# Patient Record
Sex: FEMALE | Race: WHITE | NOT HISPANIC OR LATINO
[De-identification: names, ages, dates, MRNs, and addresses within clinical notes are randomized per-mention and may not be internally consistent; named-entity substitution may affect disease eponyms.]

---

## 2017-05-05 ENCOUNTER — RX ONLY (RX ONLY)
Age: 52
End: 2017-05-05

## 2017-05-05 ENCOUNTER — APPOINTMENT (OUTPATIENT)
Dept: URBAN - METROPOLITAN AREA CLINIC 200 | Age: 52
Setting detail: DERMATOLOGY
End: 2017-05-11

## 2017-05-05 DIAGNOSIS — L74.51 PRIMARY FOCAL HYPERHIDROSIS: ICD-10-CM

## 2017-05-05 DIAGNOSIS — L57.0 ACTINIC KERATOSIS: ICD-10-CM

## 2017-05-05 DIAGNOSIS — L82.0 INFLAMED SEBORRHEIC KERATOSIS: ICD-10-CM

## 2017-05-05 DIAGNOSIS — L57.8 OTHER SKIN CHANGES DUE TO CHRONIC EXPOSURE TO NONIONIZING RADIATION: ICD-10-CM

## 2017-05-05 PROBLEM — D23.71 OTHER BENIGN NEOPLASM OF SKIN OF RIGHT LOWER LIMB, INCLUDING HIP: Status: ACTIVE | Noted: 2017-05-05

## 2017-05-05 PROBLEM — L74.513 PRIMARY FOCAL HYPERHIDROSIS, SOLES: Status: ACTIVE | Noted: 2017-05-05

## 2017-05-05 PROCEDURE — OTHER COUNSELING: OTHER

## 2017-05-05 PROCEDURE — 99203 OFFICE O/P NEW LOW 30 MIN: CPT | Mod: 25

## 2017-05-05 PROCEDURE — 17000 DESTRUCT PREMALG LESION: CPT

## 2017-05-05 PROCEDURE — 17003 DESTRUCT PREMALG LES 2-14: CPT

## 2017-05-05 PROCEDURE — OTHER LIQUID NITROGEN: OTHER

## 2017-05-05 PROCEDURE — OTHER PRESCRIPTION: OTHER

## 2017-05-05 RX ORDER — FLUOROURACIL 2 G/40G
CREAM TOPICAL
Qty: 1 | Refills: 6

## 2017-05-05 RX ORDER — CLINDAMYCIN PHOSPHATE 10 MG/ML
SOLUTION TOPICAL
Qty: 1 | Refills: 6 | Status: ERX

## 2017-05-05 ASSESSMENT — LOCATION SIMPLE DESCRIPTION DERM
LOCATION SIMPLE: RIGHT PLANTAR SURFACE
LOCATION SIMPLE: ABDOMEN
LOCATION SIMPLE: RIGHT UPPER BACK
LOCATION SIMPLE: LEFT NOSE
LOCATION SIMPLE: RIGHT EYEBROW
LOCATION SIMPLE: LEFT PLANTAR SURFACE
LOCATION SIMPLE: LEFT CHEEK

## 2017-05-05 ASSESSMENT — LOCATION DETAILED DESCRIPTION DERM
LOCATION DETAILED: LEFT NASAL ALA
LOCATION DETAILED: LEFT LATERAL MANDIBULAR CHEEK
LOCATION DETAILED: LEFT INFERIOR CENTRAL MALAR CHEEK
LOCATION DETAILED: RIGHT INSTEP
LOCATION DETAILED: RIGHT MEDIAL EYEBROW
LOCATION DETAILED: PERIUMBILICAL SKIN
LOCATION DETAILED: LEFT MEDIAL PLANTAR MIDFOOT
LOCATION DETAILED: RIGHT SUPERIOR MEDIAL UPPER BACK

## 2017-05-05 ASSESSMENT — LOCATION ZONE DERM
LOCATION ZONE: NOSE
LOCATION ZONE: FACE
LOCATION ZONE: TRUNK
LOCATION ZONE: FEET

## 2017-05-05 NOTE — PROCEDURE: LIQUID NITROGEN
Number Of Freeze-Thaw Cycles: 2 freeze-thaw cycles
Render Post-Care Instructions In Note?: no
Consent: The patient's consent was obtained including but not limited to risks of crusting, scabbing, blistering, scarring, darker or lighter pigmentary change, recurrence, incomplete removal and infection.
Duration Of Freeze Thaw-Cycle (Seconds): 10
Detail Level: Detailed
Post-Care Instructions: I reviewed with the patient in detail post-care instructions. Patient is to wear sunprotection, and avoid picking at any of the treated lesions. Pt may apply Vaseline to crusted or scabbing areas.

## 2018-05-15 ENCOUNTER — APPOINTMENT (OUTPATIENT)
Dept: URBAN - METROPOLITAN AREA CLINIC 200 | Age: 53
Setting detail: DERMATOLOGY
End: 2018-05-18

## 2018-05-15 DIAGNOSIS — L74.51 PRIMARY FOCAL HYPERHIDROSIS: ICD-10-CM

## 2018-05-15 DIAGNOSIS — B07.8 OTHER VIRAL WARTS: ICD-10-CM

## 2018-05-15 DIAGNOSIS — L57.8 OTHER SKIN CHANGES DUE TO CHRONIC EXPOSURE TO NONIONIZING RADIATION: ICD-10-CM

## 2018-05-15 DIAGNOSIS — L57.0 ACTINIC KERATOSIS: ICD-10-CM

## 2018-05-15 DIAGNOSIS — D485 NEOPLASM OF UNCERTAIN BEHAVIOR OF SKIN: ICD-10-CM

## 2018-05-15 PROBLEM — L74.513 PRIMARY FOCAL HYPERHIDROSIS, SOLES: Status: ACTIVE | Noted: 2018-05-15

## 2018-05-15 PROBLEM — D48.5 NEOPLASM OF UNCERTAIN BEHAVIOR OF SKIN: Status: ACTIVE | Noted: 2018-05-15

## 2018-05-15 PROCEDURE — OTHER PRESCRIPTION: OTHER

## 2018-05-15 PROCEDURE — OTHER MIPS QUALITY: OTHER

## 2018-05-15 PROCEDURE — 11100: CPT | Mod: 59

## 2018-05-15 PROCEDURE — 99213 OFFICE O/P EST LOW 20 MIN: CPT | Mod: 25

## 2018-05-15 PROCEDURE — OTHER COUNSELING: OTHER

## 2018-05-15 PROCEDURE — OTHER BIOPSY BY SHAVE METHOD: OTHER

## 2018-05-15 PROCEDURE — 17110 DESTRUCT B9 LESION 1-14: CPT

## 2018-05-15 PROCEDURE — OTHER LIQUID NITROGEN: OTHER

## 2018-05-15 RX ORDER — ALUMINUM CHLORIDE 20 %
SOLUTION, NON-ORAL TOPICAL QHS
Qty: 1 | Refills: 3 | Status: ERX | COMMUNITY
Start: 2018-05-15

## 2018-05-15 ASSESSMENT — LOCATION SIMPLE DESCRIPTION DERM
LOCATION SIMPLE: LEFT FOREHEAD
LOCATION SIMPLE: LEFT HAND
LOCATION SIMPLE: CHEST
LOCATION SIMPLE: LEFT PLANTAR SURFACE
LOCATION SIMPLE: LEFT CHEEK

## 2018-05-15 ASSESSMENT — LOCATION ZONE DERM
LOCATION ZONE: FACE
LOCATION ZONE: TRUNK
LOCATION ZONE: FACE
LOCATION ZONE: FEET
LOCATION ZONE: HAND

## 2018-05-15 ASSESSMENT — PAIN INTENSITY VAS: HOW INTENSE IS YOUR PAIN 0 BEING NO PAIN, 10 BEING THE MOST SEVERE PAIN POSSIBLE?: NO PAIN

## 2018-05-15 ASSESSMENT — LOCATION DETAILED DESCRIPTION DERM
LOCATION DETAILED: LEFT SUPERIOR FOREHEAD
LOCATION DETAILED: LEFT MEDIAL PLANTAR MIDFOOT
LOCATION DETAILED: LEFT CENTRAL MALAR CHEEK
LOCATION DETAILED: LEFT HYPOTHENAR EMINENCE
LOCATION DETAILED: LEFT SUPERIOR LATERAL MALAR CHEEK
LOCATION DETAILED: LEFT MEDIAL SUPERIOR CHEST

## 2018-05-15 NOTE — PROCEDURE: BIOPSY BY SHAVE METHOD
Notification Instructions: Patient will be notified of biopsy results. However, patient instructed to call the office if not contacted within 2 weeks.
Hemostasis: Drysol
Anesthesia Type: 1% lidocaine with epinephrine
Bill For Surgical Tray: no
Post-Care Instructions: I reviewed with the patient in detail post-care instructions. Patient is to keep the biopsy site dry overnight, and then apply bacitracin twice daily until healed. Patient may apply hydrogen peroxide soaks to remove any crusting.
consent was obtained and risks were reviewed including but not limited to scarring, infection, bleeding, scabbing, incomplete removal, nerve damage and allergy to anesthesia.
Detail Level: Detailed
Type Of Destruction Used: Curettage
Was A Bandage Applied: Yes
Wound Care: Vaseline
Anesthesia Volume In Cc (Will Not Render If 0): 0.1
X Size Of Lesion In Cm: 0
Dressing: bandage
Billing Type: Third-Party Bill
Size Of Lesion In Cm: 0.2
Biopsy Type: H and E
Biopsy Method: Personna blade

## 2018-05-15 NOTE — PROCEDURE: LIQUID NITROGEN
Add 52 Modifier (Optional): no
Post-Care Instructions: I reviewed with the patient in detail post-care instructions. Patient is to wear sunprotection, and avoid picking at any of the treated lesions. Pt may apply Vaseline to crusted or scabbing areas.
Consent: The patient's consent was obtained including but not limited to risks of crusting, scabbing, blistering, scarring, darker or lighter pigmentary change, recurrence, incomplete removal and infection.
Number Of Freeze-Thaw Cycles: 2 freeze-thaw cycles
Detail Level: Detailed
Pared With?: Dermablade
Medical Necessity Clause: This procedure was medically necessary because the lesions that were treated were: causing pain
Medical Necessity Information: It is in your best interest to select a reason for this procedure from the list below. All of these items fulfill various CMS LCD requirements except the new and changing color options.
Include Z78.9 (Other Specified Conditions Influencing Health Status) As An Associated Diagnosis?: Yes

## 2018-05-15 NOTE — PROCEDURE: MIPS QUALITY
Additional Notes: Patient received verbal cessation counseling (3 min or less) and hand out was given to the [facility] smoking cessation counseling program. Additional Notes: Patient received verbal cessation counseling (3 min or less) and hand out was given to the [facility] smoking cessation counseling program.

## 2018-05-29 RX ORDER — ALPRAZOLAM 0.25 MG/1
TABLET ORAL
Qty: 12 TABLET | Refills: 0 | Status: SHIPPED | OUTPATIENT
Start: 2018-05-29 | End: 2021-03-17 | Stop reason: SDUPTHER

## 2018-05-29 RX ORDER — ALPRAZOLAM 0.25 MG/1
TABLET ORAL
Qty: 12 TABLET | Refills: 0 | Status: SHIPPED | OUTPATIENT
Start: 2018-05-29 | End: 2018-05-29 | Stop reason: SDUPTHER

## 2018-07-25 ENCOUNTER — TELEPHONE (OUTPATIENT)
Dept: PRIMARY CARE | Facility: CLINIC | Age: 53
End: 2018-07-25

## 2018-07-25 RX ORDER — MOMETASONE FUROATE 1 MG/ML
SOLUTION TOPICAL DAILY
Qty: 60 ML | Refills: 0 | Status: SHIPPED | OUTPATIENT
Start: 2018-07-25 | End: 2019-07-25

## 2018-07-25 RX ORDER — METHYLPREDNISOLONE 4 MG/1
TABLET ORAL
Qty: 21 TABLET | Refills: 0 | Status: SHIPPED | OUTPATIENT
Start: 2018-07-25 | End: 2019-02-14 | Stop reason: ALTCHOICE

## 2018-07-25 RX ORDER — SERTRALINE HYDROCHLORIDE 50 MG/1
50 TABLET, FILM COATED ORAL
COMMUNITY
Start: 2011-02-15 | End: 2019-02-14 | Stop reason: ALTCHOICE

## 2019-02-14 RX ORDER — AZITHROMYCIN 250 MG/1
TABLET, FILM COATED ORAL
Qty: 6 TABLET | Refills: 0 | Status: SHIPPED | OUTPATIENT
Start: 2019-02-14 | End: 2019-02-19

## 2019-02-14 RX ORDER — ESTRADIOL 0.05 MG/D
FILM, EXTENDED RELEASE TRANSDERMAL
COMMUNITY
Start: 2018-12-04 | End: 2024-02-01 | Stop reason: ALTCHOICE

## 2019-02-14 RX ORDER — VENLAFAXINE HYDROCHLORIDE 75 MG/1
CAPSULE, EXTENDED RELEASE ORAL
COMMUNITY
Start: 2019-02-01 | End: 2022-04-28 | Stop reason: ALTCHOICE

## 2019-04-18 ENCOUNTER — TELEPHONE (OUTPATIENT)
Dept: NEUROSURGERY | Facility: CLINIC | Age: 54
End: 2019-04-18

## 2019-04-18 NOTE — TELEPHONE ENCOUNTER
The patient called she needs a letter stating her current medical  condition and her inability her to work        Please generate and I will fax to patient         Any questions please call [phone]

## 2019-05-09 ENCOUNTER — APPOINTMENT (OUTPATIENT)
Dept: URBAN - METROPOLITAN AREA CLINIC 200 | Age: 54
Setting detail: DERMATOLOGY
End: 2019-05-13

## 2019-05-09 DIAGNOSIS — L57.8 OTHER SKIN CHANGES DUE TO CHRONIC EXPOSURE TO NONIONIZING RADIATION: ICD-10-CM

## 2019-05-09 DIAGNOSIS — L82.0 INFLAMED SEBORRHEIC KERATOSIS: ICD-10-CM

## 2019-05-09 DIAGNOSIS — L20.84 INTRINSIC (ALLERGIC) ECZEMA: ICD-10-CM

## 2019-05-09 DIAGNOSIS — Z41.9 ENCOUNTER FOR PROCEDURE FOR PURPOSES OTHER THAN REMEDYING HEALTH STATE, UNSPECIFIED: ICD-10-CM

## 2019-05-09 PROCEDURE — OTHER LIQUID NITROGEN (COSMETIC): OTHER

## 2019-05-09 PROCEDURE — OTHER COUNSELING: OTHER

## 2019-05-09 PROCEDURE — OTHER MIPS QUALITY: OTHER

## 2019-05-09 PROCEDURE — 99213 OFFICE O/P EST LOW 20 MIN: CPT

## 2019-05-09 PROCEDURE — OTHER REASSURANCE: OTHER

## 2019-05-09 ASSESSMENT — LOCATION DETAILED DESCRIPTION DERM
LOCATION DETAILED: RIGHT LATERAL ABDOMEN
LOCATION DETAILED: RIGHT LATERAL SUPERIOR EYELID
LOCATION DETAILED: LEFT MEDIAL SUPERIOR CHEST
LOCATION DETAILED: SUPERIOR THORACIC SPINE

## 2019-05-09 ASSESSMENT — LOCATION SIMPLE DESCRIPTION DERM
LOCATION SIMPLE: UPPER BACK
LOCATION SIMPLE: CHEST
LOCATION SIMPLE: ABDOMEN
LOCATION SIMPLE: RIGHT SUPERIOR EYELID

## 2019-05-09 ASSESSMENT — LOCATION ZONE DERM
LOCATION ZONE: TRUNK
LOCATION ZONE: EYELID
LOCATION ZONE: TRUNK

## 2019-05-09 NOTE — PROCEDURE: COUNSELING
Detail Level: Generalized
Detail Level: Detailed
Patient Specific Counseling (Will Not Stick From Patient To Patient): Alena caldera

## 2019-05-09 NOTE — PROCEDURE: LIQUID NITROGEN (COSMETIC)
Render Post-Care Instructions In Note?: no
Consent: The patient's consent was obtained including but not limited to risks of crusting, scabbing, blistering, scarring, darker or lighter pigmentary change, recurrence, incomplete removal and infection. The patient understands that the procedure is cosmetic in nature and is not covered by insurance.
Price (Use Numbers Only, No Special Characters Or $): 25.00
Detail Level: Zone
Post-Care Instructions: I reviewed with the patient in detail post-care instructions. Patient is to wear sunprotection, and avoid picking at any of the treated lesions. Pt may apply Vaseline to crusted or scabbing areas.

## 2020-01-15 ENCOUNTER — TELEPHONE (OUTPATIENT)
Dept: PRIMARY CARE | Facility: CLINIC | Age: 55
End: 2020-01-15

## 2020-03-12 ENCOUNTER — OFFICE VISIT (OUTPATIENT)
Dept: PRIMARY CARE | Facility: CLINIC | Age: 55
End: 2020-03-12
Payer: COMMERCIAL

## 2020-03-12 VITALS
DIASTOLIC BLOOD PRESSURE: 72 MMHG | OXYGEN SATURATION: 98 % | BODY MASS INDEX: 26.06 KG/M2 | HEIGHT: 67 IN | TEMPERATURE: 97.6 F | HEART RATE: 78 BPM | SYSTOLIC BLOOD PRESSURE: 114 MMHG | WEIGHT: 166 LBS | RESPIRATION RATE: 18 BRPM

## 2020-03-12 DIAGNOSIS — Z00.00 ROUTINE ADULT HEALTH MAINTENANCE: Primary | ICD-10-CM

## 2020-03-12 DIAGNOSIS — R53.82 CHRONIC FATIGUE: ICD-10-CM

## 2020-03-12 DIAGNOSIS — G47.33 OSA (OBSTRUCTIVE SLEEP APNEA): ICD-10-CM

## 2020-03-12 DIAGNOSIS — F41.1 GAD (GENERALIZED ANXIETY DISORDER): ICD-10-CM

## 2020-03-12 DIAGNOSIS — R07.89 ATYPICAL CHEST PAIN: ICD-10-CM

## 2020-03-12 PROCEDURE — 99396 PREV VISIT EST AGE 40-64: CPT | Performed by: FAMILY MEDICINE

## 2020-03-12 SDOH — HEALTH STABILITY: MENTAL HEALTH: HOW OFTEN DO YOU HAVE A DRINK CONTAINING ALCOHOL?: MONTHLY OR LESS

## 2020-03-12 ASSESSMENT — ENCOUNTER SYMPTOMS
SHORTNESS OF BREATH: 0
ADENOPATHY: 0
SLEEP DISTURBANCE: 0
FLANK PAIN: 0
TROUBLE SWALLOWING: 0
DIFFICULTY URINATING: 0
BACK PAIN: 0
SINUS PAIN: 0
ARTHRALGIAS: 0
NERVOUS/ANXIOUS: 0
APPETITE CHANGE: 0
DIZZINESS: 0
CHEST TIGHTNESS: 1
ABDOMINAL PAIN: 0
EYE DISCHARGE: 0
FATIGUE: 1
EYE PAIN: 0
PALPITATIONS: 0
HEADACHES: 0
DECREASED CONCENTRATION: 0
WEAKNESS: 1
DYSPHORIC MOOD: 1

## 2020-03-13 NOTE — PROGRESS NOTES
Daily Progress Note      Subjective      Patient ID: Renee Bustamante is a 54 y.o. female.    HPI  For cpe    The following have been reviewed and updated as appropriate in this visit:       Review of Systems   Constitutional: Positive for fatigue. Negative for appetite change.   HENT: Negative for ear pain, sinus pain and trouble swallowing.    Eyes: Negative for pain and discharge.   Respiratory: Positive for chest tightness. Negative for shortness of breath.    Cardiovascular: Positive for chest pain. Negative for palpitations.   Gastrointestinal: Negative for abdominal pain.   Genitourinary: Negative for difficulty urinating, flank pain, menstrual problem and vaginal bleeding.   Musculoskeletal: Negative for arthralgias, back pain and gait problem.   Skin: Negative for rash.   Neurological: Positive for weakness. Negative for dizziness, syncope and headaches.   Hematological: Negative for adenopathy.   Psychiatric/Behavioral: Positive for dysphoric mood. Negative for decreased concentration and sleep disturbance. The patient is not nervous/anxious.        Current Outpatient Medications   Medication Sig Dispense Refill   • ALPRAZolam (XANAX) 0.25 mg tablet One to two tabs 1 hour prior to flight 12 tablet 0   • MINIVELLE 0.05 mg/24 hr      • venlafaxine XR (EFFEXOR-XR) 75 mg 24 hr capsule        No current facility-administered medications for this visit.      No past medical history on file.  No family history on file.  No past surgical history on file.  Social History     Socioeconomic History   • Marital status:      Spouse name: Not on file   • Number of children: Not on file   • Years of education: Not on file   • Highest education level: Not on file   Occupational History   • Not on file   Social Needs   • Financial resource strain: Not on file   • Food insecurity:     Worry: Not on file     Inability: Not on file   • Transportation needs:     Medical: Not on file     Non-medical: Not on file    Tobacco Use   • Smoking status: Never Smoker   • Smokeless tobacco: Never Used   Substance and Sexual Activity   • Alcohol use: Yes     Frequency: Monthly or less   • Drug use: Not on file   • Sexual activity: Not on file   Lifestyle   • Physical activity:     Days per week: Not on file     Minutes per session: Not on file   • Stress: Not on file   Relationships   • Social connections:     Talks on phone: Not on file     Gets together: Not on file     Attends Taoism service: Not on file     Active member of club or organization: Not on file     Attends meetings of clubs or organizations: Not on file     Relationship status: Not on file   • Intimate partner violence:     Fear of current or ex partner: Not on file     Emotionally abused: Not on file     Physically abused: Not on file     Forced sexual activity: Not on file   Other Topics Concern   • Not on file   Social History Narrative   • Not on file     Allergies   Allergen Reactions   • No Known Allergies        Objective   No new labs.    Physical Exam   Constitutional: She is oriented to person, place, and time. She appears well-developed and well-nourished.   HENT:   Head: Normocephalic and atraumatic.   Eyes: Pupils are equal, round, and reactive to light. Conjunctivae, EOM and lids are normal.   Neck: Trachea normal, normal range of motion and full passive range of motion without pain.   Cardiovascular: Normal rate, regular rhythm, normal heart sounds and intact distal pulses.   Pulmonary/Chest: Effort normal and breath sounds normal. She has no wheezes.   Abdominal: Soft. Normal appearance and bowel sounds are normal. She exhibits no mass. There is no tenderness. There is no rebound and no guarding.   Musculoskeletal: Normal range of motion.   Lymphadenopathy:     She has no cervical adenopathy.   Neurological: She is alert and oriented to person, place, and time.   Skin: Skin is warm, dry and intact.   Psychiatric: She has a normal mood and affect.  Her speech is normal and behavior is normal. Judgment and thought content normal.       Assessment/Plan     Problem List Items Addressed This Visit     None      Visit Diagnoses     Routine adult health maintenance    -  Primary    BEBA (generalized anxiety disorder)        Relevant Orders    CBC and differential    TSH w reflex FT4    Chronic fatigue        Relevant Orders    Comprehensive metabolic panel    CBC and differential    Lipid panel    TSH w reflex FT4    Home sleep test    SALOME (obstructive sleep apnea)        Relevant Orders    Home sleep test    Atypical chest pain        Relevant Orders    Transthoracic echo (TTE) complete    Exercise stress test        Orders Placed This Encounter   Procedures   • Comprehensive metabolic panel     Standing Status:   Future     Number of Occurrences:   1     Standing Expiration Date:   3/12/2021   • CBC and differential     Standing Status:   Future     Number of Occurrences:   1     Standing Expiration Date:   3/12/2021   • Lipid panel     Standing Status:   Future     Number of Occurrences:   1     Standing Expiration Date:   3/12/2021   • TSH w reflex FT4     Standing Status:   Future     Number of Occurrences:   1     Standing Expiration Date:   3/12/2021   • Transthoracic echo (TTE) complete     Standing Status:   Future     Standing Expiration Date:   3/12/2022     Order Specific Question:   Is Definity and/or agitated saline (bubbles) indicated for the study?     Answer:   No   • Home sleep test     Standing Status:   Future     Standing Expiration Date:   3/12/2021     Order Specific Question:   Where would you like to schedule this?     Answer:    [facility]     [facility]pt[facility] utd  Sees ob  utd mammo, pap, colo, adacel  Will check labs  Needs echo, est  ekg reviewed, discussed  Diet, exercise, wgt loss reviewed, discussed, stable on current rx  C/w same  salome hx reviewed  Will home study    Mayur Santos,   3/12/2020

## 2020-03-20 ENCOUNTER — TELEPHONE (OUTPATIENT)
Dept: SLEEP MEDICINE | Facility: HOSPITAL | Age: 55
End: 2020-03-20

## 2020-05-07 ENCOUNTER — APPOINTMENT (OUTPATIENT)
Dept: URBAN - METROPOLITAN AREA CLINIC 200 | Age: 55
Setting detail: DERMATOLOGY
End: 2020-05-08

## 2020-05-07 DIAGNOSIS — L57.0 ACTINIC KERATOSIS: ICD-10-CM

## 2020-05-07 DIAGNOSIS — L82.0 INFLAMED SEBORRHEIC KERATOSIS: ICD-10-CM

## 2020-05-07 DIAGNOSIS — D485 NEOPLASM OF UNCERTAIN BEHAVIOR OF SKIN: ICD-10-CM

## 2020-05-07 DIAGNOSIS — B07.8 OTHER VIRAL WARTS: ICD-10-CM

## 2020-05-07 PROBLEM — D48.5 NEOPLASM OF UNCERTAIN BEHAVIOR OF SKIN: Status: ACTIVE | Noted: 2020-05-07

## 2020-05-07 PROCEDURE — 17110 DESTRUCT B9 LESION 1-14: CPT

## 2020-05-07 PROCEDURE — 17000 DESTRUCT PREMALG LESION: CPT | Mod: 59

## 2020-05-07 PROCEDURE — 11102 TANGNTL BX SKIN SINGLE LES: CPT | Mod: 59

## 2020-05-07 PROCEDURE — OTHER REASSURANCE: OTHER

## 2020-05-07 PROCEDURE — 99213 OFFICE O/P EST LOW 20 MIN: CPT | Mod: 25

## 2020-05-07 PROCEDURE — OTHER BIOPSY BY SHAVE METHOD: OTHER

## 2020-05-07 PROCEDURE — OTHER LIQUID NITROGEN: OTHER

## 2020-05-07 PROCEDURE — 17003 DESTRUCT PREMALG LES 2-14: CPT | Mod: 59

## 2020-05-07 PROCEDURE — OTHER COUNSELING: OTHER

## 2020-05-07 ASSESSMENT — LOCATION DETAILED DESCRIPTION DERM
LOCATION DETAILED: LEFT DISTAL PRETIBIAL REGION
LOCATION DETAILED: LEFT LATERAL DISTAL PRETIBIAL REGION
LOCATION DETAILED: LEFT INFERIOR LATERAL MALAR CHEEK
LOCATION DETAILED: MIDDLE STERNUM
LOCATION DETAILED: LEFT INFERIOR CENTRAL MALAR CHEEK
LOCATION DETAILED: RIGHT MEDIAL SUPERIOR CHEST
LOCATION DETAILED: INFERIOR THORACIC SPINE

## 2020-05-07 ASSESSMENT — LOCATION ZONE DERM
LOCATION ZONE: TRUNK
LOCATION ZONE: LEG
LOCATION ZONE: FACE

## 2020-05-07 ASSESSMENT — LOCATION SIMPLE DESCRIPTION DERM
LOCATION SIMPLE: UPPER BACK
LOCATION SIMPLE: LEFT PRETIBIAL REGION
LOCATION SIMPLE: LEFT CHEEK
LOCATION SIMPLE: CHEST

## 2020-05-07 ASSESSMENT — PAIN INTENSITY VAS: HOW INTENSE IS YOUR PAIN 0 BEING NO PAIN, 10 BEING THE MOST SEVERE PAIN POSSIBLE?: NO PAIN

## 2020-05-07 NOTE — PROCEDURE: BIOPSY BY SHAVE METHOD
Validate Anticipated Plan: No
Dressing: bandage
Anesthesia Volume In Cc (Will Not Render If 0): 0.5
Information: Selecting Yes will display possible errors in your note based on the variables you have selected. This validation is only offered as a suggestion for you. PLEASE NOTE THAT THE VALIDATION TEXT WILL BE REMOVED WHEN YOU FINALIZE YOUR NOTE. IF YOU WANT TO FAX A PRELIMINARY NOTE YOU WILL NEED TO TOGGLE THIS TO 'NO' IF YOU DO NOT WANT IT IN YOUR FAXED NOTE.
Curettage Text: The wound bed was treated with curettage after the biopsy was performed.
Billing Type: Third-Party Bill
Additional Anesthesia Volume In Cc (Will Not Render If 0): 0
Was A Bandage Applied: Yes
Silver Nitrate Text: The wound bed was treated with silver nitrate after the biopsy was performed.
Biopsy Type: H and E
Hemostasis: Drysol
Detail Level: Detailed
Consent: Written consent was obtained and risks were reviewed including but not limited to scarring, infection, bleeding, scabbing, incomplete removal, nerve damage and allergy to anesthesia.
Electrodesiccation And Curettage Text: The wound bed was treated with electrodesiccation and curettage after the biopsy was performed.
Size Of Lesion In Cm: 0.2
Type Of Destruction Used: Curettage
Biopsy Method: sterile single edge surgical blade
Wound Care: Aquaphor
Notification Instructions: Patient will be notified of biopsy results. However, patient instructed to call the office if not contacted within 2 weeks.
Electrodesiccation Text: The wound bed was treated with electrodesiccation after the biopsy was performed.
Post-Care Instructions: I reviewed with the patient in detail post-care instructions. Patient is to keep the biopsy site dry overnight, and then apply bacitracin twice daily until healed. Patient may apply hydrogen peroxide soaks to remove any crusting.
Depth Of Biopsy: dermis
Cryotherapy Text: The wound bed was treated with cryotherapy after the biopsy was performed.

## 2020-05-07 NOTE — PROCEDURE: LIQUID NITROGEN
Detail Level: Detailed
Duration Of Freeze Thaw-Cycle (Seconds): 2
Post-Care Instructions: I reviewed with the patient in detail post-care instructions. Patient is to wear sunprotection, and avoid picking at any of the treated lesions. Pt may apply Vaseline to crusted or scabbing areas.
Render Post-Care Instructions In Note?: no
Consent: The patient's consent was obtained including but not limited to risks of crusting, scabbing, blistering, scarring, darker or lighter pigmentary change, recurrence, incomplete removal and infection.
Medical Necessity Clause: This procedure was medically necessary because the lesions that were treated were: causing pain
Include Z78.9 (Other Specified Conditions Influencing Health Status) As An Associated Diagnosis?: Yes
Number Of Freeze-Thaw Cycles: 2 freeze-thaw cycles
Number Of Freeze-Thaw Cycles: 1 freeze-thaw cycle
Medical Necessity Information: It is in your best interest to select a reason for this procedure from the list below. All of these items fulfill various CMS LCD requirements except the new and changing color options.

## 2020-06-09 ENCOUNTER — HOSPITAL ENCOUNTER (OUTPATIENT)
Dept: SLEEP MEDICINE | Facility: HOSPITAL | Age: 55
Discharge: HOME | End: 2020-06-09
Attending: FAMILY MEDICINE
Payer: COMMERCIAL

## 2020-06-09 DIAGNOSIS — G47.33 OSA (OBSTRUCTIVE SLEEP APNEA): ICD-10-CM

## 2020-06-09 DIAGNOSIS — R53.82 CHRONIC FATIGUE: ICD-10-CM

## 2020-06-09 PROCEDURE — G0399 HOME SLEEP TEST/TYPE 3 PORTA: HCPCS

## 2020-06-18 ENCOUNTER — TELEPHONE (OUTPATIENT)
Dept: PRIMARY CARE | Facility: CLINIC | Age: 55
End: 2020-06-18

## 2020-06-18 DIAGNOSIS — G47.33 OSA (OBSTRUCTIVE SLEEP APNEA): Primary | ICD-10-CM

## 2020-06-18 LAB
ALBUMIN SERPL-MCNC: 4.6 G/DL (ref 3.8–4.9)
ALBUMIN/GLOB SERPL: 2.2 {RATIO} (ref 1.2–2.2)
ALP SERPL-CCNC: 78 IU/L (ref 39–117)
ALT SERPL-CCNC: 22 IU/L (ref 0–32)
AST SERPL-CCNC: 18 IU/L (ref 0–40)
BASOPHILS # BLD AUTO: 0 X10E3/UL (ref 0–0.2)
BASOPHILS NFR BLD AUTO: 1 %
BILIRUB SERPL-MCNC: <0.2 MG/DL (ref 0–1.2)
BUN SERPL-MCNC: 11 MG/DL (ref 6–24)
BUN/CREAT SERPL: 15 (ref 9–23)
CALCIUM SERPL-MCNC: 9.4 MG/DL (ref 8.7–10.2)
CHLORIDE SERPL-SCNC: 102 MMOL/L (ref 96–106)
CHOLEST SERPL-MCNC: 200 MG/DL (ref 100–199)
CO2 SERPL-SCNC: 23 MMOL/L (ref 20–29)
CREAT SERPL-MCNC: 0.75 MG/DL (ref 0.57–1)
EOSINOPHIL # BLD AUTO: 0.1 X10E3/UL (ref 0–0.4)
EOSINOPHIL NFR BLD AUTO: 2 %
ERYTHROCYTE [DISTWIDTH] IN BLOOD BY AUTOMATED COUNT: 12 % (ref 11.7–15.4)
GLOBULIN SER CALC-MCNC: 2.1 G/DL (ref 1.5–4.5)
GLUCOSE SERPL-MCNC: 99 MG/DL (ref 65–99)
HCT VFR BLD AUTO: 38 % (ref 34–46.6)
HDLC SERPL-MCNC: 52 MG/DL
HGB BLD-MCNC: 13 G/DL (ref 11.1–15.9)
IMM GRANULOCYTES # BLD AUTO: 0 X10E3/UL (ref 0–0.1)
IMM GRANULOCYTES NFR BLD AUTO: 0 %
LAB CORP EGFR IF AFRICN AM: 105 ML/MIN/1.73
LAB CORP EGFR IF NONAFRICN AM: 91 ML/MIN/1.73
LDLC SERPL CALC-MCNC: 113 MG/DL (ref 0–99)
LYMPHOCYTES # BLD AUTO: 1.9 X10E3/UL (ref 0.7–3.1)
LYMPHOCYTES NFR BLD AUTO: 39 %
MCH RBC QN AUTO: 30.4 PG (ref 26.6–33)
MCHC RBC AUTO-ENTMCNC: 34.2 G/DL (ref 31.5–35.7)
MCV RBC AUTO: 89 FL (ref 79–97)
MONOCYTES # BLD AUTO: 0.3 X10E3/UL (ref 0.1–0.9)
MONOCYTES NFR BLD AUTO: 7 %
NEUTROPHILS # BLD AUTO: 2.5 X10E3/UL (ref 1.4–7)
NEUTROPHILS NFR BLD AUTO: 51 %
PLATELET # BLD AUTO: 220 X10E3/UL (ref 150–450)
POTASSIUM SERPL-SCNC: 4.3 MMOL/L (ref 3.5–5.2)
PROT SERPL-MCNC: 6.7 G/DL (ref 6–8.5)
RBC # BLD AUTO: 4.27 X10E6/UL (ref 3.77–5.28)
SODIUM SERPL-SCNC: 139 MMOL/L (ref 134–144)
T4 FREE SERPL-MCNC: 1.04 NG/DL (ref 0.82–1.77)
TRIGL SERPL-MCNC: 174 MG/DL (ref 0–149)
TSH SERPL DL<=0.005 MIU/L-ACNC: 3.49 UIU/ML (ref 0.45–4.5)
VLDLC SERPL CALC-MCNC: 35 MG/DL (ref 5–40)
WBC # BLD AUTO: 4.9 X10E3/UL (ref 3.4–10.8)

## 2020-06-19 ENCOUNTER — TELEPHONE (OUTPATIENT)
Dept: PRIMARY CARE | Facility: CLINIC | Age: 55
End: 2020-06-19

## 2020-06-19 NOTE — TELEPHONE ENCOUNTER
Ramya from [facility] called. You had ordered in-lab sleep study for pt, do you want her to follow up from sleep doctor first because then they would be the ones who order the c-pap and supplies. If you don't want her to see sleep doc then you will have to order c-pap, supplies and handle the care for CORTEZ.   Please advise.

## 2020-07-13 ENCOUNTER — TRANSCRIBE ORDERS (OUTPATIENT)
Dept: SCHEDULING | Age: 55
End: 2020-07-13

## 2020-07-13 DIAGNOSIS — Z12.31 ENCOUNTER FOR SCREENING MAMMOGRAM FOR MALIGNANT NEOPLASM OF BREAST: Primary | ICD-10-CM

## 2020-07-16 ENCOUNTER — HOSPITAL ENCOUNTER (OUTPATIENT)
Dept: RADIOLOGY | Facility: HOSPITAL | Age: 55
Discharge: HOME | End: 2020-07-16
Attending: SPECIALIST
Payer: COMMERCIAL

## 2020-07-16 DIAGNOSIS — Z12.31 ENCOUNTER FOR SCREENING MAMMOGRAM FOR MALIGNANT NEOPLASM OF BREAST: ICD-10-CM

## 2020-07-16 PROCEDURE — 77063 BREAST TOMOSYNTHESIS BI: CPT

## 2020-08-10 ENCOUNTER — TRANSCRIBE ORDERS (OUTPATIENT)
Dept: SCHEDULING | Age: 55
End: 2020-08-10

## 2020-08-10 DIAGNOSIS — R07.89 OTHER CHEST PAIN: Primary | ICD-10-CM

## 2020-08-13 ENCOUNTER — TELEPHONE (OUTPATIENT)
Dept: PRIMARY CARE | Facility: CLINIC | Age: 55
End: 2020-08-13

## 2020-08-13 ENCOUNTER — CLINICAL SUPPORT (OUTPATIENT)
Dept: PRIMARY CARE | Facility: CLINIC | Age: 55
End: 2020-08-13
Payer: COMMERCIAL

## 2020-08-13 DIAGNOSIS — Z01.818 PRE-OP TESTING: Primary | ICD-10-CM

## 2020-08-13 PROCEDURE — 93000 ELECTROCARDIOGRAM COMPLETE: CPT | Performed by: FAMILY MEDICINE

## 2020-08-13 NOTE — TELEPHONE ENCOUNTER
Sent precert over to [facility] case number [id number] precert pending for stress echo, [facility] doesn't need precert

## 2020-08-17 ENCOUNTER — TELEPHONE (OUTPATIENT)
Dept: PRIMARY CARE | Facility: CLINIC | Age: 55
End: 2020-08-17

## 2020-08-17 NOTE — TELEPHONE ENCOUNTER
pts precert for stress echo was denied please paperwork on your desk. Please let pt know what the next step is.

## 2020-08-18 ENCOUNTER — TELEPHONE (OUTPATIENT)
Dept: PRIMARY CARE | Facility: CLINIC | Age: 55
End: 2020-08-18

## 2020-08-18 NOTE — TELEPHONE ENCOUNTER
precert for stress echo was approved with dr baker approval number [id number], Dr baker wants to know who didn't approve sleep study. I called pt lm for her to call us back

## 2020-08-18 NOTE — TELEPHONE ENCOUNTER
Spoke with sleep lab the home study was adequate enough use those results to order the cpap. (you can order the cpap or send her to the sleep dr) per the sleep lab. Any questions please call the sleep lab lizabeth he is the coordinator here is his cell [phone] I spoke with dagmar at the sleep center

## 2020-08-19 NOTE — TELEPHONE ENCOUNTER
Chirag barone ?  lmom  Not sure why they won't allow an in-lab study to be done  Need to titrate the cpap settings as well as familiarize the patient with the apparati

## 2020-08-20 ENCOUNTER — TELEPHONE (OUTPATIENT)
Dept: PRIMARY CARE | Facility: CLINIC | Age: 55
End: 2020-08-20

## 2020-08-20 DIAGNOSIS — G47.30 SLEEP APNEA, UNSPECIFIED TYPE: Primary | ICD-10-CM

## 2020-09-01 ENCOUNTER — HOSPITAL ENCOUNTER (OUTPATIENT)
Dept: CARDIOLOGY | Facility: CLINIC | Age: 55
Discharge: HOME | End: 2020-09-01
Attending: FAMILY MEDICINE
Payer: COMMERCIAL

## 2020-09-01 VITALS
HEART RATE: 75 BPM | WEIGHT: 166 LBS | SYSTOLIC BLOOD PRESSURE: 118 MMHG | HEIGHT: 67 IN | DIASTOLIC BLOOD PRESSURE: 74 MMHG | BODY MASS INDEX: 26.06 KG/M2

## 2020-09-01 DIAGNOSIS — R07.89 OTHER CHEST PAIN: ICD-10-CM

## 2020-09-01 LAB
AORTIC ROOT ANNULUS - M-MODE: 3.1 CM
AORTIC ROOT ANNULUS - M-MODE: 3.1 CM
BSA FOR ECHO PROCEDURE: 1.87 M2
E WAVE DECELERATION TIME: 246 MS
E/A RATIO: 0.9
E/E' RATIO: 6.5
E/LAT E' RATIO: 4.7
EDV (BP): 72 CM3
EDV (MM-TEICH): 128 CM3
EF (A4C): 57.2 %
EF (MM-TEICH): 72.4 %
EF A2C: 75.9 %
EJECTION FRACTION: 68.8 %
ESV (BP): 22.5 CM3
ESV (MM-TEICH): 35.3 CM3
INTERVENTRICULAR SEPTUM: 0.9 CM
LA ESV (BP): 45 CM3
LA ESV INDEX (A2C): 32.67 CM3/M2
LA ESV INDEX (BP): 24.06 CM3/M2
LA/AORTA RATIO: 1.06
LAAS-AP2: 19.7 CM2
LAAS-AP4: 12.1 CM2
LAD 2D - M-MODE: 3.3 CM
LAD 2D - M-MODE: 3.3 CM
LALD A4C: 4.24 CM
LALD A4C: 4.94 CM
LAV-S: 61.1 CM3
LEFT ATRIUM VOLUME INDEX: 15.24 CM3/M2
LEFT ATRIUM VOLUME: 28.5 CM3
LEFT INTERNAL DIMENSION IN SYSTOLE: 3 CM (ref 2.61–3.95)
LEFT VENTRICLE DIASTOLIC VOLUME INDEX: 35.89 CM3/M2
LEFT VENTRICLE DIASTOLIC VOLUME: 67.11 CM3
LEFT VENTRICLE SYSTOLIC VOLUME INDEX: 15.37 CM3/M2
LEFT VENTRICLE SYSTOLIC VOLUME: 28.74 CM3
LEFT VENTRICULAR INTERNAL DIMENSION IN DIASTOLE: 5.2 CM (ref 4.42–6.13)
LV DIASTOLIC VOLUME: 71.83 CM3
LV ESV (APICAL 2 CHAMBER): 17.31 CM3
LVAD-AP2: 24.5 CM2
LVAD-AP4: 22.9 CM2
LVAS-AP2: 10.4 CM2
LVAS-AP4: 13.7 CM2
LVEDVI(A2C): 38.41 CM3/M2
LVEDVI(BP): 38.5 CM3/M2
LVESVI(A2C): 9.26 CM3/M2
LVESVI(BP): 12.03 CM3/M2
LVLD-AP2: 7.04 CM
LVLD-AP4: 6.51 CM
LVLS-AP2: 5.49 CM
LVLS-AP4: 5.65 CM
M MODE - INTERVENTRICULAR SEPTUM IN END DIASTOLE: 0.85 CM
M MODE - LEFT INTERNAL DIMENSION IN SYSTOLE: 3.01 CM
M MODE - LEFT VENTRICULAR INTERNAL DIMENSION IN DIASTOLE: 5.17 CM
M MODE - LEFT VENTRICULAR POSTERIOR WALL IN END DIASTOLE: 0.9 CM
M MODE - LEFT VENTRICULAR POSTERIOR WALL IN END DIASTOLE: 0.9 CM
MV E'TISSUE VEL-LAT: 0.1 M/S
MV E'TISSUE VEL-MED: 0.07 M/S
MV PEAK A VEL: 0.54 M/S
MV PEAK E VEL: 0.47 M/S
POSTERIOR WALL: 0.9 CM
STRESS ANGINA INDEX: 0
STRESS BASELINE BP: NORMAL MMHG
STRESS BASELINE HR: 76 BPM
STRESS PERCENT HR: 93 %
STRESS POST ESTIMATED WORKLOAD: 10.1 METS
STRESS POST EXERCISE DUR MIN: 8 MIN
STRESS POST EXERCISE DUR SEC: 0 SEC
STRESS POST PEAK BP: NORMAL MMHG
STRESS POST PEAK HR: 155 BPM
STRESS TARGET HR: 141 BPM
Z-SCORE OF LEFT VENTRICULAR DIMENSION IN END DIASTOLE: -0.01
Z-SCORE OF LEFT VENTRICULAR DIMENSION IN END SYSTOLE: -0.54

## 2020-09-01 PROCEDURE — 93351 STRESS TTE COMPLETE: CPT

## 2020-09-08 ENCOUNTER — TELEPHONE (OUTPATIENT)
Dept: SLEEP MEDICINE | Facility: HOSPITAL | Age: 55
End: 2020-09-08

## 2020-09-08 NOTE — TELEPHONE ENCOUNTER
Lillian denied the CPAP titration sleep study.  Will need to see PULM/SLEEP with HST results to be autopap

## 2020-10-29 ENCOUNTER — TELEPHONE (OUTPATIENT)
Dept: PRIMARY CARE | Facility: CLINIC | Age: 55
End: 2020-10-29

## 2020-10-29 PROBLEM — G47.33 OSA (OBSTRUCTIVE SLEEP APNEA): Status: ACTIVE | Noted: 2020-10-29

## 2021-02-23 RX ORDER — ESOMEPRAZOLE MAGNESIUM 40 MG/1
40 CAPSULE, DELAYED RELEASE ORAL
Qty: 90 CAPSULE | Refills: 2 | Status: SHIPPED | OUTPATIENT
Start: 2021-02-23 | End: 2021-06-30 | Stop reason: SDUPTHER

## 2021-03-17 RX ORDER — ALPRAZOLAM 0.25 MG/1
TABLET ORAL
Qty: 12 TABLET | Refills: 0 | Status: SHIPPED | OUTPATIENT
Start: 2021-03-17 | End: 2022-09-09 | Stop reason: SDUPTHER

## 2021-05-06 ENCOUNTER — APPOINTMENT (OUTPATIENT)
Dept: URBAN - METROPOLITAN AREA CLINIC 200 | Age: 56
Setting detail: DERMATOLOGY
End: 2021-05-13

## 2021-05-06 DIAGNOSIS — L44.8 OTHER SPECIFIED PAPULOSQUAMOUS DISORDERS: ICD-10-CM

## 2021-05-06 DIAGNOSIS — Z85.828 PERSONAL HISTORY OF OTHER MALIGNANT NEOPLASM OF SKIN: ICD-10-CM

## 2021-05-06 DIAGNOSIS — L72.0 EPIDERMAL CYST: ICD-10-CM

## 2021-05-06 DIAGNOSIS — L57.8 OTHER SKIN CHANGES DUE TO CHRONIC EXPOSURE TO NONIONIZING RADIATION: ICD-10-CM

## 2021-05-06 PROCEDURE — OTHER PRESCRIPTION: OTHER

## 2021-05-06 PROCEDURE — 99213 OFFICE O/P EST LOW 20 MIN: CPT

## 2021-05-06 PROCEDURE — OTHER PRESCRIPTION MEDICATION MANAGEMENT: OTHER

## 2021-05-06 PROCEDURE — OTHER COUNSELING: OTHER

## 2021-05-06 PROCEDURE — OTHER REASSURANCE: OTHER

## 2021-05-06 RX ORDER — TRIAMCINOLONE ACETONIDE 1 MG/G
CREAM TOPICAL
Qty: 1 | Refills: 3 | Status: ERX | COMMUNITY
Start: 2021-05-06

## 2021-05-06 ASSESSMENT — LOCATION ZONE DERM
LOCATION ZONE: LEG
LOCATION ZONE: FACE
LOCATION ZONE: TRUNK
LOCATION ZONE: NECK

## 2021-05-06 ASSESSMENT — LOCATION SIMPLE DESCRIPTION DERM
LOCATION SIMPLE: LEFT THIGH
LOCATION SIMPLE: LEFT ANTERIOR NECK
LOCATION SIMPLE: CHEST
LOCATION SIMPLE: LEFT CHEEK

## 2021-05-06 ASSESSMENT — LOCATION DETAILED DESCRIPTION DERM
LOCATION DETAILED: LEFT MEDIAL SUPERIOR CHEST
LOCATION DETAILED: LEFT SUPERIOR CENTRAL BUCCAL CHEEK
LOCATION DETAILED: LEFT INFERIOR LATERAL NECK
LOCATION DETAILED: LEFT INFERIOR ANTERIOR NECK
LOCATION DETAILED: LEFT ANTERIOR DISTAL THIGH

## 2021-05-06 NOTE — PROCEDURE: REASSURANCE
Hide Additional Notes?: No
Detail Level: Generalized
Additional Notes (Optional): Extracted
Detail Level: Detailed

## 2021-06-30 RX ORDER — ESOMEPRAZOLE MAGNESIUM 40 MG/1
40 CAPSULE, DELAYED RELEASE ORAL
Qty: 90 CAPSULE | Refills: 2 | Status: SHIPPED | OUTPATIENT
Start: 2021-06-30 | End: 2022-04-28 | Stop reason: ALTCHOICE

## 2021-07-14 ENCOUNTER — TRANSCRIBE ORDERS (OUTPATIENT)
Dept: SCHEDULING | Age: 56
End: 2021-07-14

## 2021-07-14 DIAGNOSIS — Z12.31 ENCOUNTER FOR SCREENING MAMMOGRAM FOR MALIGNANT NEOPLASM OF BREAST: Primary | ICD-10-CM

## 2021-08-05 ENCOUNTER — HOSPITAL ENCOUNTER (OUTPATIENT)
Dept: RADIOLOGY | Facility: HOSPITAL | Age: 56
Discharge: HOME | End: 2021-08-05
Attending: SPECIALIST
Payer: COMMERCIAL

## 2021-08-05 DIAGNOSIS — Z12.31 ENCOUNTER FOR SCREENING MAMMOGRAM FOR MALIGNANT NEOPLASM OF BREAST: ICD-10-CM

## 2021-08-05 PROCEDURE — 77067 SCR MAMMO BI INCL CAD: CPT

## 2021-09-20 ENCOUNTER — APPOINTMENT (OUTPATIENT)
Dept: URBAN - METROPOLITAN AREA CLINIC 200 | Age: 56
Setting detail: DERMATOLOGY
End: 2021-09-27

## 2021-09-20 DIAGNOSIS — D485 NEOPLASM OF UNCERTAIN BEHAVIOR OF SKIN: ICD-10-CM

## 2021-09-20 DIAGNOSIS — L57.0 ACTINIC KERATOSIS: ICD-10-CM

## 2021-09-20 PROBLEM — D48.5 NEOPLASM OF UNCERTAIN BEHAVIOR OF SKIN: Status: ACTIVE | Noted: 2021-09-20

## 2021-09-20 PROCEDURE — OTHER BIOPSY BY SHAVE METHOD: OTHER

## 2021-09-20 PROCEDURE — 11102 TANGNTL BX SKIN SINGLE LES: CPT

## 2021-09-20 PROCEDURE — 17000 DESTRUCT PREMALG LESION: CPT | Mod: 59

## 2021-09-20 PROCEDURE — OTHER LIQUID NITROGEN: OTHER

## 2021-09-20 PROCEDURE — 17003 DESTRUCT PREMALG LES 2-14: CPT

## 2021-09-20 ASSESSMENT — LOCATION SIMPLE DESCRIPTION DERM
LOCATION SIMPLE: RIGHT SCALP
LOCATION SIMPLE: LEFT NOSE
LOCATION SIMPLE: LEFT SCALP

## 2021-09-20 ASSESSMENT — LOCATION DETAILED DESCRIPTION DERM
LOCATION DETAILED: LEFT CENTRAL FRONTAL SCALP
LOCATION DETAILED: LEFT NASAL SIDEWALL
LOCATION DETAILED: RIGHT CENTRAL FRONTAL SCALP

## 2021-09-20 ASSESSMENT — LOCATION ZONE DERM
LOCATION ZONE: SCALP
LOCATION ZONE: NOSE

## 2021-09-20 NOTE — PROCEDURE: LIQUID NITROGEN
Show Applicator Variable?: Yes
Detail Level: Detailed
Post-Care Instructions: I reviewed with the patient in detail post-care instructions. Patient is to wear sunprotection, and avoid picking at any of the treated lesions. Pt may apply Vaseline to crusted or scabbing areas.
Consent: The patient's consent was obtained including but not limited to risks of crusting, scabbing, blistering, scarring, darker or lighter pigmentary change, recurrence, incomplete removal and infection.
Render Post-Care Instructions In Note?: no
Duration Of Freeze Thaw-Cycle (Seconds): 2
Number Of Freeze-Thaw Cycles: 1 freeze-thaw cycle

## 2021-09-20 NOTE — PROCEDURE: BIOPSY BY SHAVE METHOD
Cryotherapy Text: The wound bed was treated with cryotherapy after the biopsy was performed.
Consent: Written consent was obtained and risks were reviewed including but not limited to scarring, infection, bleeding, scabbing, incomplete removal, nerve damage and allergy to anesthesia.
X Size Of Lesion In Cm: 0
Bill For Surgical Tray: no
Detail Level: Detailed
Type Of Destruction Used: Curettage
Anesthesia Type: 0.5% lidocaine with 1:200,000 epinephrine
Curettage Text: The wound bed was treated with curettage after the biopsy was performed.
Notification Instructions: Patient will be notified of biopsy results. However, patient instructed to call the office if not contacted within 2 weeks.
Wound Care: Aquaphor
Biopsy Method: sterile single edge surgical blade
Post-Care Instructions: I reviewed with the patient in detail post-care instructions. Patient is to keep the biopsy site dry overnight, and then apply bacitracin twice daily until healed. Patient may apply hydrogen peroxide soaks to remove any crusting.
Anesthesia Volume In Cc (Will Not Render If 0): 0.5
Dressing: bandage
Information: Selecting Yes will display possible errors in your note based on the variables you have selected. This validation is only offered as a suggestion for you. PLEASE NOTE THAT THE VALIDATION TEXT WILL BE REMOVED WHEN YOU FINALIZE YOUR NOTE. IF YOU WANT TO FAX A PRELIMINARY NOTE YOU WILL NEED TO TOGGLE THIS TO 'NO' IF YOU DO NOT WANT IT IN YOUR FAXED NOTE.
Depth Of Biopsy: dermis
Billing Type: Third-Party Bill
Silver Nitrate Text: The wound bed was treated with silver nitrate after the biopsy was performed.
Was A Bandage Applied: Yes
Electrodesiccation And Curettage Text: The wound bed was treated with electrodesiccation and curettage after the biopsy was performed.
Electrodesiccation Text: The wound bed was treated with electrodesiccation after the biopsy was performed.
Biopsy Type: H and E
Hemostasis: Drysol

## 2021-09-28 ENCOUNTER — APPOINTMENT (OUTPATIENT)
Dept: URBAN - METROPOLITAN AREA CLINIC 200 | Age: 56
Setting detail: DERMATOLOGY
End: 2021-10-01

## 2021-09-28 DIAGNOSIS — Z11.52 ENCOUNTER FOR SCREENING FOR COVID-19: ICD-10-CM

## 2021-09-28 DIAGNOSIS — D485 NEOPLASM OF UNCERTAIN BEHAVIOR OF SKIN: ICD-10-CM

## 2021-09-28 PROBLEM — D48.5 NEOPLASM OF UNCERTAIN BEHAVIOR OF SKIN: Status: ACTIVE | Noted: 2021-09-28

## 2021-09-28 PROCEDURE — OTHER SCREENING FOR COVID-19: OTHER

## 2021-09-28 PROCEDURE — OTHER BIOPSY BY SHAVE METHOD: OTHER

## 2021-09-28 PROCEDURE — 11102 TANGNTL BX SKIN SINGLE LES: CPT | Mod: 79

## 2021-09-28 ASSESSMENT — LOCATION SIMPLE DESCRIPTION DERM
LOCATION SIMPLE: LEFT NOSE
LOCATION SIMPLE: ABDOMEN

## 2021-09-28 ASSESSMENT — LOCATION DETAILED DESCRIPTION DERM
LOCATION DETAILED: PERIUMBILICAL SKIN
LOCATION DETAILED: LEFT NASAL SIDEWALL

## 2021-09-28 ASSESSMENT — LOCATION ZONE DERM
LOCATION ZONE: NOSE
LOCATION ZONE: TRUNK

## 2021-09-28 NOTE — PROCEDURE: BIOPSY BY SHAVE METHOD
Hide Additional Anticipated Plan?: No
Validate Note Data (See Information Below): Yes
Silver Nitrate Text: The wound bed was treated with silver nitrate after the biopsy was performed.
Biopsy Type: H and E
Hemostasis: Drysol
Size Of Lesion In Cm: 0
Detail Level: Detailed
Consent: Written consent was obtained and risks were reviewed including but not limited to scarring, infection, bleeding, scabbing, incomplete removal, nerve damage and allergy to anesthesia.
Anesthesia Type: 0.5% lidocaine with 1:200,000 epinephrine
Type Of Destruction Used: Curettage
Biopsy Method: sterile single edge surgical blade
Electrodesiccation And Curettage Text: The wound bed was treated with electrodesiccation and curettage after the biopsy was performed.
Notification Instructions: Patient will be notified of biopsy results. However, patient instructed to call the office if not contacted within 2 weeks.
Wound Care: Aquaphor
Electrodesiccation Text: The wound bed was treated with electrodesiccation after the biopsy was performed.
Post-Care Instructions: I reviewed with the patient in detail post-care instructions. Patient is to keep the biopsy site dry overnight, and then apply bacitracin twice daily until healed. Patient may apply hydrogen peroxide soaks to remove any crusting.
Depth Of Biopsy: dermis
Cryotherapy Text: The wound bed was treated with cryotherapy after the biopsy was performed.
Curettage Text: The wound bed was treated with curettage after the biopsy was performed.
Dressing: bandage
Information: Selecting Yes will display possible errors in your note based on the variables you have selected. This validation is only offered as a suggestion for you. PLEASE NOTE THAT THE VALIDATION TEXT WILL BE REMOVED WHEN YOU FINALIZE YOUR NOTE. IF YOU WANT TO FAX A PRELIMINARY NOTE YOU WILL NEED TO TOGGLE THIS TO 'NO' IF YOU DO NOT WANT IT IN YOUR FAXED NOTE.
Anesthesia Volume In Cc (Will Not Render If 0): 0.5
Billing Type: Third-Party Bill

## 2021-10-20 ENCOUNTER — APPOINTMENT (OUTPATIENT)
Dept: URBAN - METROPOLITAN AREA CLINIC 200 | Age: 56
Setting detail: DERMATOLOGY
End: 2021-10-20

## 2021-10-20 PROBLEM — C44.311 BASAL CELL CARCINOMA OF SKIN OF NOSE: Status: ACTIVE | Noted: 2021-10-20

## 2021-10-20 PROCEDURE — OTHER REFERRAL: OTHER

## 2022-02-24 NOTE — LETTER
October 29, 2020     Mayur Santos DO  [address]  [place] [address]    Patient: Renee Bustamante   YOB: 1965   Date of Visit: 10/29/2020       Dear Sirs;      Please supply to the above named patient, Mrs. Renee Bustamante, one oral appliance as medically necessary due to her diagnosis of obstructive sleep apnea (G47.33).    Please contact me should you require anything further.             Sincerely,                           Mayur Santos DO           84

## 2022-04-28 ENCOUNTER — OFFICE VISIT (OUTPATIENT)
Dept: PRIMARY CARE | Facility: CLINIC | Age: 57
End: 2022-04-28
Payer: COMMERCIAL

## 2022-04-28 VITALS
BODY MASS INDEX: 25.58 KG/M2 | WEIGHT: 163 LBS | SYSTOLIC BLOOD PRESSURE: 126 MMHG | RESPIRATION RATE: 14 BRPM | HEART RATE: 85 BPM | DIASTOLIC BLOOD PRESSURE: 80 MMHG | OXYGEN SATURATION: 97 % | TEMPERATURE: 97.2 F | HEIGHT: 67 IN

## 2022-04-28 DIAGNOSIS — C44.90 SKIN CANCER: ICD-10-CM

## 2022-04-28 DIAGNOSIS — R53.83 OTHER FATIGUE: ICD-10-CM

## 2022-04-28 DIAGNOSIS — G47.33 OSA (OBSTRUCTIVE SLEEP APNEA): Primary | ICD-10-CM

## 2022-04-28 PROCEDURE — 99214 OFFICE O/P EST MOD 30 MIN: CPT | Performed by: FAMILY MEDICINE

## 2022-04-28 PROCEDURE — 3008F BODY MASS INDEX DOCD: CPT | Performed by: FAMILY MEDICINE

## 2022-04-28 RX ORDER — ESOMEPRAZOLE MAGNESIUM 40 MG/1
40 CAPSULE, DELAYED RELEASE ORAL
Qty: 90 CAPSULE | Refills: 2 | Status: SHIPPED | OUTPATIENT
Start: 2022-04-28 | End: 2022-05-25 | Stop reason: SDUPTHER

## 2022-04-28 RX ORDER — VENLAFAXINE HYDROCHLORIDE 150 MG/1
150 CAPSULE, EXTENDED RELEASE ORAL DAILY
Qty: 90 CAPSULE | Refills: 3 | Status: SHIPPED | OUTPATIENT
Start: 2022-04-28 | End: 2024-02-01 | Stop reason: ALTCHOICE

## 2022-04-28 ASSESSMENT — ENCOUNTER SYMPTOMS
DIFFICULTY URINATING: 0
CONSTIPATION: 0
DIZZINESS: 0
EYE PAIN: 0
JOINT SWELLING: 0
PALPITATIONS: 0
SHORTNESS OF BREATH: 0
FEVER: 0
ACTIVITY CHANGE: 1
FREQUENCY: 0
DECREASED CONCENTRATION: 1
SLEEP DISTURBANCE: 1
CHEST TIGHTNESS: 0
WHEEZING: 0
BACK PAIN: 0
ABDOMINAL PAIN: 0
ARTHRALGIAS: 0
EYE DISCHARGE: 0
SORE THROAT: 0
NAUSEA: 0
SPEECH DIFFICULTY: 0
FATIGUE: 1
CHILLS: 0
DYSPHORIC MOOD: 1
DIARRHEA: 0

## 2022-04-28 ASSESSMENT — PATIENT HEALTH QUESTIONNAIRE - PHQ9: SUM OF ALL RESPONSES TO PHQ9 QUESTIONS 1 & 2: 0

## 2022-04-29 NOTE — PROGRESS NOTES
Daily Progress Note      Subjective      Patient ID: Renee Bustamante is a 56 y.o. female.    HPI  Progressive fatigue, some concern re cognitive presence  Non-specific  Also, for rhm , skin ca discussion  Sleep apnea review  Feels well otherwise    The following have been reviewed and updated as appropriate in this visit:   Problems         Review of Systems   Constitutional: Positive for activity change and fatigue. Negative for chills and fever.   HENT: Negative for congestion, dental problem, ear pain, postnasal drip and sore throat.    Eyes: Negative for pain, discharge and visual disturbance.   Respiratory: Negative for chest tightness, shortness of breath and wheezing.    Cardiovascular: Negative for chest pain and palpitations.   Gastrointestinal: Negative for abdominal pain, constipation, diarrhea and nausea.   Genitourinary: Negative for difficulty urinating, frequency and urgency.   Musculoskeletal: Negative for arthralgias, back pain and joint swelling.   Skin: Negative.    Neurological: Negative for dizziness, syncope and speech difficulty.   Psychiatric/Behavioral: Positive for decreased concentration, dysphoric mood and sleep disturbance.       Current Outpatient Medications   Medication Sig Dispense Refill   • ALPRAZolam (XANAX) 0.25 mg tablet One to two tabs 1 hour prior to flight 12 tablet 0   • esomeprazole (NexIUM) 40 mg capsule Take 1 capsule (40 mg total) by mouth daily before breakfast. 90 capsule 2   • MINIVELLE 0.05 mg/24 hr      • venlafaxine XR (EFFEXOR XR) 150 mg 24 hr capsule Take 1 capsule (150 mg total) by mouth daily. 90 capsule 3     No current facility-administered medications for this visit.     No past medical history on file.  No family history on file.  No past surgical history on file.  Social History     Socioeconomic History   • Marital status:      Spouse name: Not on file   • Number of children: Not on file   • Years of education: Not on file   • Highest  education level: Not on file   Occupational History   • Not on file   Tobacco Use   • Smoking status: Never Smoker   • Smokeless tobacco: Never Used   Substance and Sexual Activity   • Alcohol use: Yes   • Drug use: Not on file   • Sexual activity: Not on file   Other Topics Concern   • Not on file   Social History Narrative   • Not on file     Social Determinants of Health     Financial Resource Strain: Not on file   Food Insecurity: Not on file   Transportation Needs: Not on file   Physical Activity: Not on file   Stress: Not on file   Social Connections: Not on file   Intimate Partner Violence: Not on file   Housing Stability: Not on file     Allergies   Allergen Reactions   • No Known Allergies        Objective   I have reviewed the patient's pertinent labs. Pertinent labs are within normal limits.    Physical Exam  Constitutional:       Appearance: Normal appearance. She is well-developed.   HENT:      Head: Normocephalic and atraumatic.   Eyes:      General: Lids are normal.      Conjunctiva/sclera: Conjunctivae normal.      Pupils: Pupils are equal, round, and reactive to light.   Neck:      Trachea: Trachea normal.   Cardiovascular:      Rate and Rhythm: Normal rate and regular rhythm.      Heart sounds: Normal heart sounds.   Pulmonary:      Effort: Pulmonary effort is normal.      Breath sounds: Normal breath sounds. No wheezing.   Abdominal:      General: Bowel sounds are normal.      Palpations: Abdomen is soft. There is no mass.      Tenderness: There is no abdominal tenderness. There is no guarding or rebound.   Musculoskeletal:         General: Normal range of motion.      Cervical back: Full passive range of motion without pain and normal range of motion.   Lymphadenopathy:      Cervical: No cervical adenopathy.   Skin:     General: Skin is warm and dry.   Neurological:      Mental Status: She is alert and oriented to person, place, and time.   Psychiatric:         Speech: Speech normal.          Behavior: Behavior normal.         Thought Content: Thought content normal.         Judgment: Judgment normal.         Assessment/Plan     Problem List Items Addressed This Visit        Respiratory    CORTEZ (obstructive sleep apnea) - Primary    Relevant Orders    Comprehensive metabolic panel    TSH    T4, free    Lipid panel    CBC and differential    Home sleep test       Other    Skin cancer    Relevant Orders    CBC and differential      Other Visit Diagnoses     Other fatigue        Relevant Orders    TSH    T4, free        Orders Placed This Encounter   Procedures   • Comprehensive metabolic panel     Standing Status:   Future     Number of Occurrences:   1     Standing Expiration Date:   4/28/2023     Order Specific Question:   Release to patient     Answer:   Immediate   • TSH     Standing Status:   Future     Number of Occurrences:   1     Standing Expiration Date:   4/28/2023     Order Specific Question:   Release to patient     Answer:   Immediate   • T4, free     Standing Status:   Future     Number of Occurrences:   1     Standing Expiration Date:   4/28/2023     Order Specific Question:   Release to patient     Answer:   Immediate   • Lipid panel     Standing Status:   Future     Number of Occurrences:   1     Standing Expiration Date:   4/28/2023     Order Specific Question:   Release to patient     Answer:   Immediate   • CBC and differential     Standing Status:   Future     Number of Occurrences:   1     Standing Expiration Date:   4/28/2023     Order Specific Question:   Release to patient     Answer:   Immediate   • Home sleep test     Standing Status:   Future     Standing Expiration Date:   4/28/2023     Order Specific Question:   Where would you like to schedule this?     [facility]    [facility]     Order Specific Question:   Release to patient     Answer:   Immediate     Reviewed rx, dx, tx, hx  Will re-check labs  Will re-check sleep study  Discussed rx, stable on current c/w same  Will qhs  tylenol pm, re-check 2 w  T/c change to wellbutrin, will follow  Sees althea, ob      Mayur Santos, DO  4/28/2022

## 2022-05-12 ENCOUNTER — TELEPHONE (OUTPATIENT)
Dept: PRIMARY CARE | Facility: CLINIC | Age: 57
End: 2022-05-12
Payer: COMMERCIAL

## 2022-05-12 RX ORDER — METHYLPREDNISOLONE 4 MG/1
TABLET ORAL
Qty: 21 TABLET | Refills: 0 | Status: SHIPPED | OUTPATIENT
Start: 2022-05-12 | End: 2022-05-19

## 2022-05-12 RX ORDER — MOMETASONE FUROATE 1 MG/G
CREAM TOPICAL DAILY
Qty: 45 G | Refills: 1 | Status: SHIPPED | OUTPATIENT
Start: 2022-05-12 | End: 2022-06-11

## 2022-05-19 LAB
BASOPHILS # BLD AUTO: 0.1 X10E3/UL (ref 0–0.2)
BASOPHILS NFR BLD AUTO: 1 %
EOSINOPHIL # BLD AUTO: 0.2 X10E3/UL (ref 0–0.4)
EOSINOPHIL NFR BLD AUTO: 3 %
ERYTHROCYTE [DISTWIDTH] IN BLOOD BY AUTOMATED COUNT: 11.9 % (ref 11.7–15.4)
HCT VFR BLD AUTO: 39.7 % (ref 34–46.6)
HGB BLD-MCNC: 13.6 G/DL (ref 11.1–15.9)
IMM GRANULOCYTES # BLD AUTO: 0.1 X10E3/UL (ref 0–0.1)
IMM GRANULOCYTES NFR BLD AUTO: 1 %
LYMPHOCYTES # BLD AUTO: 2.8 X10E3/UL (ref 0.7–3.1)
LYMPHOCYTES NFR BLD AUTO: 50 %
MCH RBC QN AUTO: 30.2 PG (ref 26.6–33)
MCHC RBC AUTO-ENTMCNC: 34.3 G/DL (ref 31.5–35.7)
MCV RBC AUTO: 88 FL (ref 79–97)
MONOCYTES # BLD AUTO: 0.4 X10E3/UL (ref 0.1–0.9)
MONOCYTES NFR BLD AUTO: 7 %
NEUTROPHILS # BLD AUTO: 2 X10E3/UL (ref 1.4–7)
NEUTROPHILS NFR BLD AUTO: 38 %
PLATELET # BLD AUTO: 260 X10E3/UL (ref 150–450)
RBC # BLD AUTO: 4.51 X10E6/UL (ref 3.77–5.28)
WBC # BLD AUTO: 5.4 X10E3/UL (ref 3.4–10.8)

## 2022-05-20 LAB
ALBUMIN SERPL-MCNC: 4.7 G/DL (ref 3.8–4.9)
ALBUMIN/GLOB SERPL: 2.6 {RATIO} (ref 1.2–2.2)
ALP SERPL-CCNC: 75 IU/L (ref 44–121)
ALT SERPL-CCNC: 22 IU/L (ref 0–32)
AST SERPL-CCNC: 16 IU/L (ref 0–40)
BILIRUB SERPL-MCNC: <0.2 MG/DL (ref 0–1.2)
BUN SERPL-MCNC: 10 MG/DL (ref 6–24)
BUN/CREAT SERPL: 14 (ref 9–23)
CALCIUM SERPL-MCNC: 9.5 MG/DL (ref 8.7–10.2)
CHLORIDE SERPL-SCNC: 106 MMOL/L (ref 96–106)
CHOLEST SERPL-MCNC: 215 MG/DL (ref 100–199)
CO2 SERPL-SCNC: 25 MMOL/L (ref 20–29)
CREAT SERPL-MCNC: 0.69 MG/DL (ref 0.57–1)
EGFRCR SERPLBLD CKD-EPI 2021: 102 ML/MIN/1.73
GLOBULIN SER CALC-MCNC: 1.8 G/DL (ref 1.5–4.5)
GLUCOSE SERPL-MCNC: 88 MG/DL (ref 65–99)
HDLC SERPL-MCNC: 53 MG/DL
LDLC SERPL CALC-MCNC: 110 MG/DL (ref 0–99)
POTASSIUM SERPL-SCNC: 4.5 MMOL/L (ref 3.5–5.2)
PROT SERPL-MCNC: 6.5 G/DL (ref 6–8.5)
SODIUM SERPL-SCNC: 147 MMOL/L (ref 134–144)
T4 FREE SERPL-MCNC: 1.44 NG/DL (ref 0.82–1.77)
TRIGL SERPL-MCNC: 307 MG/DL (ref 0–149)
TSH SERPL DL<=0.005 MIU/L-ACNC: 1.4 UIU/ML (ref 0.45–4.5)
VLDLC SERPL CALC-MCNC: 52 MG/DL (ref 5–40)

## 2022-05-25 RX ORDER — ESOMEPRAZOLE MAGNESIUM 40 MG/1
40 CAPSULE, DELAYED RELEASE ORAL
Qty: 90 CAPSULE | Refills: 3 | Status: SHIPPED | OUTPATIENT
Start: 2022-05-25 | End: 2023-06-08

## 2022-07-22 ENCOUNTER — APPOINTMENT (OUTPATIENT)
Dept: URBAN - METROPOLITAN AREA CLINIC 203 | Age: 57
Setting detail: DERMATOLOGY
End: 2022-07-22

## 2022-07-22 DIAGNOSIS — Z85.828 PERSONAL HISTORY OF OTHER MALIGNANT NEOPLASM OF SKIN: ICD-10-CM

## 2022-07-22 DIAGNOSIS — Z11.52 ENCOUNTER FOR SCREENING FOR COVID-19: ICD-10-CM

## 2022-07-22 DIAGNOSIS — L23.7 ALLERGIC CONTACT DERMATITIS DUE TO PLANTS, EXCEPT FOOD: ICD-10-CM

## 2022-07-22 DIAGNOSIS — L57.8 OTHER SKIN CHANGES DUE TO CHRONIC EXPOSURE TO NONIONIZING RADIATION: ICD-10-CM

## 2022-07-22 DIAGNOSIS — L57.0 ACTINIC KERATOSIS: ICD-10-CM

## 2022-07-22 PROCEDURE — OTHER COUNSELING: OTHER

## 2022-07-22 PROCEDURE — OTHER SUNSCREEN RECOMMENDATIONS: OTHER

## 2022-07-22 PROCEDURE — OTHER LIQUID NITROGEN: OTHER

## 2022-07-22 PROCEDURE — OTHER PRESCRIPTION MEDICATION MANAGEMENT: OTHER

## 2022-07-22 PROCEDURE — OTHER SCREENING FOR COVID-19: OTHER

## 2022-07-22 PROCEDURE — 17000 DESTRUCT PREMALG LESION: CPT

## 2022-07-22 PROCEDURE — 99213 OFFICE O/P EST LOW 20 MIN: CPT | Mod: 25

## 2022-07-22 PROCEDURE — 17003 DESTRUCT PREMALG LES 2-14: CPT

## 2022-07-22 ASSESSMENT — LOCATION DETAILED DESCRIPTION DERM
LOCATION DETAILED: LEFT NASAL DORSUM
LOCATION DETAILED: RIGHT MEDIAL FRONTAL SCALP
LOCATION DETAILED: LEFT MEDIAL BREAST 11-12:00 REGION
LOCATION DETAILED: LEFT CENTRAL FRONTAL SCALP
LOCATION DETAILED: LEFT MEDIAL BREAST 10-11:00 REGION
LOCATION DETAILED: LEFT SUPERIOR LATERAL MALAR CHEEK
LOCATION DETAILED: NASAL DORSUM
LOCATION DETAILED: EPIGASTRIC SKIN
LOCATION DETAILED: LEFT LATERAL ABDOMEN
LOCATION DETAILED: PERIUMBILICAL SKIN

## 2022-07-22 ASSESSMENT — LOCATION ZONE DERM
LOCATION ZONE: SCALP
LOCATION ZONE: FACE
LOCATION ZONE: TRUNK
LOCATION ZONE: NOSE

## 2022-07-22 ASSESSMENT — LOCATION SIMPLE DESCRIPTION DERM
LOCATION SIMPLE: NOSE
LOCATION SIMPLE: LEFT CHEEK
LOCATION SIMPLE: LEFT SCALP
LOCATION SIMPLE: LEFT BREAST
LOCATION SIMPLE: RIGHT SCALP
LOCATION SIMPLE: ABDOMEN

## 2022-07-22 ASSESSMENT — PAIN INTENSITY VAS: HOW INTENSE IS YOUR PAIN 0 BEING NO PAIN, 10 BEING THE MOST SEVERE PAIN POSSIBLE?: NO PAIN

## 2022-07-25 ENCOUNTER — TRANSCRIBE ORDERS (OUTPATIENT)
Dept: SCHEDULING | Age: 57
End: 2022-07-25

## 2022-07-25 DIAGNOSIS — Z12.31 ENCOUNTER FOR SCREENING MAMMOGRAM FOR MALIGNANT NEOPLASM OF BREAST: Primary | ICD-10-CM

## 2022-07-26 ENCOUNTER — RX ONLY (RX ONLY)
Age: 57
End: 2022-07-26

## 2022-07-26 RX ORDER — CLOBETASOL PROPIONATE 0.05 G/100ML
LOTION TOPICAL
Qty: 59 | Refills: 3 | Status: ERX | COMMUNITY
Start: 2022-07-26

## 2022-08-16 ENCOUNTER — HOSPITAL ENCOUNTER (OUTPATIENT)
Dept: RADIOLOGY | Facility: HOSPITAL | Age: 57
Discharge: HOME | End: 2022-08-16
Attending: SPECIALIST
Payer: COMMERCIAL

## 2022-08-16 DIAGNOSIS — Z12.31 ENCOUNTER FOR SCREENING MAMMOGRAM FOR MALIGNANT NEOPLASM OF BREAST: ICD-10-CM

## 2022-08-16 PROCEDURE — 77063 BREAST TOMOSYNTHESIS BI: CPT

## 2022-08-24 ENCOUNTER — HOSPITAL ENCOUNTER (EMERGENCY)
Facility: HOSPITAL | Age: 57
Discharge: HOME | End: 2022-08-25
Attending: EMERGENCY MEDICINE
Payer: COMMERCIAL

## 2022-08-24 ENCOUNTER — APPOINTMENT (EMERGENCY)
Dept: RADIOLOGY | Facility: HOSPITAL | Age: 57
End: 2022-08-24
Payer: COMMERCIAL

## 2022-08-24 VITALS
SYSTOLIC BLOOD PRESSURE: 131 MMHG | DIASTOLIC BLOOD PRESSURE: 71 MMHG | HEART RATE: 78 BPM | BODY MASS INDEX: 25.9 KG/M2 | WEIGHT: 165 LBS | RESPIRATION RATE: 16 BRPM | OXYGEN SATURATION: 96 % | HEIGHT: 67 IN | TEMPERATURE: 97.4 F

## 2022-08-24 DIAGNOSIS — I49.9 CARDIAC ARRHYTHMIA, UNSPECIFIED CARDIAC ARRHYTHMIA TYPE: Primary | ICD-10-CM

## 2022-08-24 DIAGNOSIS — I49.1 PREMATURE CONTRACTIONS, SUPRAVENTRICULAR: ICD-10-CM

## 2022-08-24 LAB
ALBUMIN SERPL-MCNC: 4.2 G/DL (ref 3.4–5)
ALP SERPL-CCNC: 56 IU/L (ref 35–126)
ALT SERPL-CCNC: 19 IU/L (ref 11–54)
ANION GAP SERPL CALC-SCNC: 5 MEQ/L (ref 3–15)
AST SERPL-CCNC: 16 IU/L (ref 15–41)
BASOPHILS # BLD: 0.05 K/UL (ref 0.01–0.1)
BASOPHILS NFR BLD: 0.5 %
BILIRUB SERPL-MCNC: 0.4 MG/DL (ref 0.3–1.2)
BUN SERPL-MCNC: 17 MG/DL (ref 8–20)
CALCIUM SERPL-MCNC: 9.5 MG/DL (ref 8.9–10.3)
CHLORIDE SERPL-SCNC: 101 MEQ/L (ref 98–109)
CO2 SERPL-SCNC: 27 MEQ/L (ref 22–32)
CREAT SERPL-MCNC: 0.7 MG/DL (ref 0.6–1.1)
DIFFERENTIAL METHOD BLD: ABNORMAL
EOSINOPHIL # BLD: 0.12 K/UL (ref 0.04–0.36)
EOSINOPHIL NFR BLD: 1.3 %
ERYTHROCYTE [DISTWIDTH] IN BLOOD BY AUTOMATED COUNT: 11.2 % (ref 11.7–14.4)
GFR SERPL CREATININE-BSD FRML MDRD: >60 ML/MIN/1.73M*2
GLUCOSE SERPL-MCNC: 97 MG/DL (ref 70–99)
HCT VFR BLDCO AUTO: 40.2 % (ref 35–45)
HGB BLD-MCNC: 13.2 G/DL (ref 11.8–15.7)
IMM GRANULOCYTES # BLD AUTO: 0.05 K/UL (ref 0–0.08)
IMM GRANULOCYTES NFR BLD AUTO: 0.5 %
LYMPHOCYTES # BLD: 2.55 K/UL (ref 1.2–3.5)
LYMPHOCYTES NFR BLD: 27.4 %
MCH RBC QN AUTO: 30.6 PG (ref 28–33.2)
MCHC RBC AUTO-ENTMCNC: 32.8 G/DL (ref 32.2–35.5)
MCV RBC AUTO: 93.1 FL (ref 83–98)
MONOCYTES # BLD: 0.59 K/UL (ref 0.28–0.8)
MONOCYTES NFR BLD: 6.4 %
NEUTROPHILS # BLD: 5.93 K/UL (ref 1.7–7)
NEUTS SEG NFR BLD: 63.9 %
NRBC BLD-RTO: 0 %
PDW BLD AUTO: 9.1 FL (ref 9.4–12.3)
PLATELET # BLD AUTO: 245 K/UL (ref 150–369)
POTASSIUM SERPL-SCNC: 4.3 MEQ/L (ref 3.6–5.1)
PROT SERPL-MCNC: 7.2 G/DL (ref 6–8.2)
RBC # BLD AUTO: 4.32 M/UL (ref 3.93–5.22)
SODIUM SERPL-SCNC: 133 MEQ/L (ref 136–144)
TROPONIN I SERPL HS-MCNC: <2 PG/ML
TSH SERPL DL<=0.05 MIU/L-ACNC: 1.47 MIU/L (ref 0.34–5.6)
WBC # BLD AUTO: 9.29 K/UL (ref 3.8–10.5)

## 2022-08-24 PROCEDURE — 84484 ASSAY OF TROPONIN QUANT: CPT

## 2022-08-24 PROCEDURE — 93005 ELECTROCARDIOGRAM TRACING: CPT

## 2022-08-24 PROCEDURE — 36415 COLL VENOUS BLD VENIPUNCTURE: CPT

## 2022-08-24 PROCEDURE — 93005 ELECTROCARDIOGRAM TRACING: CPT | Performed by: EMERGENCY MEDICINE

## 2022-08-24 PROCEDURE — 80053 COMPREHEN METABOLIC PANEL: CPT | Performed by: EMERGENCY MEDICINE

## 2022-08-24 PROCEDURE — 84484 ASSAY OF TROPONIN QUANT: CPT | Performed by: EMERGENCY MEDICINE

## 2022-08-24 PROCEDURE — 99283 EMERGENCY DEPT VISIT LOW MDM: CPT | Mod: 25

## 2022-08-24 PROCEDURE — 85025 COMPLETE CBC W/AUTO DIFF WBC: CPT

## 2022-08-24 PROCEDURE — 71046 X-RAY EXAM CHEST 2 VIEWS: CPT

## 2022-08-24 PROCEDURE — 85025 COMPLETE CBC W/AUTO DIFF WBC: CPT | Performed by: EMERGENCY MEDICINE

## 2022-08-24 PROCEDURE — 84443 ASSAY THYROID STIM HORMONE: CPT | Performed by: PHYSICIAN ASSISTANT

## 2022-08-24 PROCEDURE — 80053 COMPREHEN METABOLIC PANEL: CPT

## 2022-08-25 ENCOUNTER — TELEPHONE (OUTPATIENT)
Dept: SCHEDULING | Facility: CLINIC | Age: 57
End: 2022-08-25
Payer: COMMERCIAL

## 2022-08-25 LAB
ATRIAL RATE: 78
P AXIS: 7
PR INTERVAL: 120
QRS DURATION: 78
QT INTERVAL: 388
QTC CALCULATION(BAZETT): 442
R AXIS: 3
T WAVE AXIS: 23
VENTRICULAR RATE: 78

## 2022-08-25 PROCEDURE — 93005 ELECTROCARDIOGRAM TRACING: CPT | Performed by: PHYSICIAN ASSISTANT

## 2022-08-25 ASSESSMENT — ENCOUNTER SYMPTOMS
SHORTNESS OF BREATH: 0
SPEECH DIFFICULTY: 0
SEIZURES: 0
FEVER: 0
ACTIVITY CHANGE: 0
NAUSEA: 0
WHEEZING: 0
DIAPHORESIS: 0
WEAKNESS: 0
SORE THROAT: 0
ABDOMINAL PAIN: 0
COUGH: 0
DIFFICULTY URINATING: 0
AGITATION: 0
HEMATURIA: 0
HEADACHES: 0
NECK PAIN: 0
VOMITING: 0
FACIAL SWELLING: 0
DIARRHEA: 0
COLOR CHANGE: 0
PALPITATIONS: 1
BACK PAIN: 0

## 2022-08-25 NOTE — ED ATTESTATION NOTE
Procedures  Physical Exam  Review of Systems    8/25/202211:03 AM  I have personally seen and examined the patient.  I reviewed and agree with the PA/NP/Resident's assessment and plan of care.    My examination, assessment, and plan of care of Renee Bustamante is as follows:    The patient presents with an palpitations and fatigue.  Says that this is going on for a few days.  The palpitations are just a single beat of a thump.  When they occur she does not feel any more fatigue.  She usually does.  There is no chest heaviness or shortness of breath or any decreased exercise tolerance other than the general sense of fatigue.  She has not been sleeping well saying that she does have a history of sleep apnea and there is a few more stresses going on in her life because they have more kids at home than usual.  When she wakes up in the morning she does not feel rested.  She did not eat breakfast today but did have some lunch.    Exam: Currently no distress.  Is awake alert and oriented.  Lungs are clear.  Heart S1-S2 without any murmur.  Abdomen soft and nontender.  There is no pedal edema.    Impression/Plan: EKG does not show any ischemic changes.  There is 1 PAC present.  Labs including troponins are otherwise unremarkable except for slightly low sodium of 133.  Patient has been encouraged to try to not skip meals and perhaps add more salt to foods.  Chest x-ray is also normal.  The palpitations patient is having is most likely a consequence of the fatigue and what are causing that rather than the fatigue and weakness caused by the palpitations.  May have to do with patient not sleeping well such as some depression possible problem with sleep apnea.  She said she would look into both of these possibilities and follow-up with her doctors.  Agree with PA management.    Vital Signs Review: Vital signs have been reviewed. The oxygen saturation is  SpO2: 96 % which is normal.    This document was created using dragon  dictation software.  There might be some typographical errors due to this technology.    We are in a pandemic with increased volumes and decreased capacity.      Wilton Blount MD  08/25/22 9773

## 2022-08-25 NOTE — DISCHARGE INSTRUCTIONS
Increase fluids, decrease caffiene.    Please follow up with cardiology- call for next available appointment.    RETURN TO ER WITH CHEST PAIN/ SHORTNESS OF BREATH OR PALPITATIONS THAT DO NOT SUBSIDE..

## 2022-08-25 NOTE — ED PROVIDER NOTES
Emergency Medicine Note  HPI   HISTORY OF PRESENT ILLNESS     Patient here complaining of irregular heartbeat that started yesterday morning, states no aggravating or alleviating factors.  Patient states that she felt fluttering, and is skipped beat.  States no chest pain, no shortness of breath.  Patient states she has been eating well, denies any nausea, vomiting, diarrhea.  Patient with no recent illnesses, no fever, chills, sweats.  Patient states she only drinks coffee minimally, because it makes her tired.  Patient called PCP and was directed to the ER for further evaluation.  Patient does have a history of sleep apnea, just wears a mouthguard at this point, not CPAP.            Patient History   PAST HISTORY     Reviewed from Nursing Triage:  Tobacco  Allergies  Meds  Problems  Med Hx  Surg Hx  Fam Hx  Soc   Hx        History reviewed. No pertinent past medical history.    History reviewed. No pertinent surgical history.    History reviewed. No pertinent family history.    Social History     Tobacco Use    Smoking status: Never Smoker    Smokeless tobacco: Never Used   Substance Use Topics    Alcohol use: Yes         Review of Systems   REVIEW OF SYSTEMS     Review of Systems   Constitutional: Negative for activity change, diaphoresis and fever.   HENT: Negative for facial swelling and sore throat.    Eyes: Negative for visual disturbance.   Respiratory: Negative for cough, shortness of breath and wheezing.    Cardiovascular: Positive for palpitations. Negative for chest pain and leg swelling.   Gastrointestinal: Negative for abdominal pain, diarrhea, nausea and vomiting.   Genitourinary: Negative for difficulty urinating and hematuria.   Musculoskeletal: Negative for back pain and neck pain.   Skin: Negative for color change and pallor.   Neurological: Negative for seizures, syncope, speech difficulty, weakness and headaches.   Psychiatric/Behavioral: Negative for agitation and behavioral problems.    All other systems reviewed and are negative.        VITALS     ED Vitals    Date/Time Temp Pulse Resp BP SpO2 [facility]   08/24/22 2103 36.3 °C (97.4 °F) 78 16 131/71 96 % LTE        Pulse Ox %: 96 % (08/25/22 0025)  Pulse Ox Interpretation: Normal (08/25/22 0025)  Heart Rate: 78 (08/25/22 0025)  Rhythm Strip Interpretation: Normal Sinus Rhythm (08/25/22 0025)     Physical Exam   PHYSICAL EXAM     Physical Exam  Vitals and nursing note reviewed.   Constitutional:       General: She is not in acute distress.     Appearance: She is well-developed.   HENT:      Head: Normocephalic and atraumatic.   Eyes:      Conjunctiva/sclera: Conjunctivae normal.   Cardiovascular:      Rate and Rhythm: Normal rate and regular rhythm.      Heart sounds: No murmur heard.  Pulmonary:      Effort: Pulmonary effort is normal.      Breath sounds: Normal breath sounds.   Chest:      Chest wall: No tenderness.   Abdominal:      General: There is no distension.      Palpations: Abdomen is soft. There is no mass.      Tenderness: There is no abdominal tenderness.   Musculoskeletal:         General: No tenderness (no calf tenderness) or deformity. Normal range of motion.      Cervical back: Normal range of motion.      Right lower leg: No edema.      Left lower leg: No edema.   Lymphadenopathy:      Cervical: No cervical adenopathy.   Skin:     General: Skin is warm and dry.   Neurological:      General: No focal deficit present.      Mental Status: She is alert. Mental status is at baseline.   Psychiatric:         Behavior: Behavior normal.           PROCEDURES     Procedures     DATA     Results     Procedure Component Value Units Date/Time    TSH w reflex FT4 [[id number]]  (Normal) Collected: 08/24/22 2109    Specimen: Blood, Venous Updated: 08/24/22 2346     TSH 1.47 mIU/L     HS Troponin I (with 2 hour reflex) [[id number]]  (Normal) Collected: 08/24/22 2109    Specimen: Blood, Venous Updated: 08/24/22 2205     High Sens Troponin I <2.00 pg/mL      Comprehensive metabolic panel [[id number]]  (Abnormal) Collected: 08/24/22 2109    Specimen: Blood, Venous Updated: 08/24/22 2151     Sodium 133 mEQ/L      Potassium 4.3 mEQ/L      Comment: Results obtained on plasma. Plasma Potassium values may be up to 0.4 mEQ/L less than serum values. The differences may be greater for patients with high platelet or white cell counts.        Chloride 101 mEQ/L      CO2 27 mEQ/L      BUN 17 mg/dL      Creatinine 0.7 mg/dL      Glucose 97 mg/dL      Calcium 9.5 mg/dL      AST (SGOT) 16 IU/L      ALT (SGPT) 19 IU/L      Alkaline Phosphatase 56 IU/L      Total Protein 7.2 g/dL      Comment: Test performed on plasma which typically contains approximately 0.4 g/dL more protein than serum.        Albumin 4.2 g/dL      Bilirubin, Total 0.4 mg/dL      eGFR >60.0 mL/min/1.73m*2      Anion Gap 5 mEQ/L     CBC and differential [[id number]]  (Abnormal) Collected: 08/24/22 2109    Specimen: Blood, Venous Updated: 08/24/22 2122     WBC 9.29 K/uL      RBC 4.32 M/uL      Hemoglobin 13.2 g/dL      Hematocrit 40.2 %      MCV 93.1 fL      MCH 30.6 pg      MCHC 32.8 g/dL      RDW 11.2 %      Platelets 245 K/uL      MPV 9.1 fL      Differential Type Auto     nRBC 0.0 %      Immature Granulocytes 0.5 %      Neutrophils 63.9 %      Lymphocytes 27.4 %      Monocytes 6.4 %      Eosinophils 1.3 %      Basophils 0.5 %      Immature Granulocytes, Absolute 0.05 K/uL      Neutrophils, Absolute 5.93 K/uL      Lymphocytes, Absolute 2.55 K/uL      Monocytes, Absolute 0.59 K/uL      Eosinophils, Absolute 0.12 K/uL      Basophils, Absolute 0.05 K/uL     RAINBOW LT BLUE [[id number]] Collected: 08/24/22 2109    Specimen: Blood, Venous Updated: 08/24/22 2117    RAINBOW RED [[id number]] Collected: 08/24/22 2109    Specimen: Blood, Venous Updated: 08/24/22 2117    [place] Draw Panel [[id number]] Collected: 08/24/22 2109    Specimen: Blood, Venous Updated: 08/24/22 2117    Narrative:      The following orders were created  for panel order [place] Draw Panel.  Procedure                               Abnormality         Status                     ---------                               -----------         ------                     RAINBOW RED[[id number]]                                      In process                 RAINBOW LT BLUE[[id number]]                                  In process                 RAINBOW GOLD[[id number]]                                     In process                   Please view results for these tests on the individual orders.    RAINBOW GOLD [[id number]] Collected: 08/24/22 2109    Specimen: Blood, Venous Updated: 08/24/22 2117          Imaging Results          X-RAY CHEST 2 VIEWS (Preliminary result)  Result time 08/25/22 01:06:32    ED Interpretation    Nad  With dr villar                                ECG 12 lead   ED Interpretation   Rhythm: Normal Sinus with premature supraventricular beat noted    P waves: normal interval  QRS: normal QRS  Axis: normal  ST Segments: no obvious ST elevation or ischemia    Read with Dr. Villar        Rhythm Strip: NSR  O2 sat: normal             Scoring tools                                  ED Course & MDM   MDM / ED COURSE / CLINICAL IMPRESSION / DISPO     MDM  Number of Diagnoses or Management Options     Amount and/or Complexity of Data Reviewed  Clinical lab tests: reviewed  Independent visualization of images, tracings, or specimens: yes           Clinical Impression      Cardiac arrhythmia, unspecified cardiac arrhythmia type   Premature contractions, supraventricular     Disposition  Discharge         Alpa Garcia PA C  08/25/22 0115

## 2022-08-26 ENCOUNTER — OFFICE VISIT (OUTPATIENT)
Dept: CARDIOLOGY | Facility: CLINIC | Age: 57
End: 2022-08-26
Payer: COMMERCIAL

## 2022-08-26 VITALS
RESPIRATION RATE: 16 BRPM | SYSTOLIC BLOOD PRESSURE: 112 MMHG | DIASTOLIC BLOOD PRESSURE: 76 MMHG | HEART RATE: 69 BPM | HEIGHT: 67 IN | BODY MASS INDEX: 25.84 KG/M2

## 2022-08-26 DIAGNOSIS — I49.1 PREMATURE CONTRACTIONS, SUPRAVENTRICULAR: Primary | ICD-10-CM

## 2022-08-26 PROCEDURE — 3008F BODY MASS INDEX DOCD: CPT | Performed by: INTERNAL MEDICINE

## 2022-08-26 PROCEDURE — 99204 OFFICE O/P NEW MOD 45 MIN: CPT | Performed by: INTERNAL MEDICINE

## 2022-08-26 PROCEDURE — 93000 ELECTROCARDIOGRAM COMPLETE: CPT | Performed by: INTERNAL MEDICINE

## 2022-08-26 RX ORDER — ACETAMINOPHEN 500 MG
5000 TABLET ORAL DAILY
COMMUNITY
End: 2025-01-06 | Stop reason: ALTCHOICE

## 2022-08-26 RX ORDER — PROPRANOLOL HYDROCHLORIDE 10 MG/1
10 TABLET ORAL 4 TIMES DAILY PRN
Qty: 30 TABLET | Refills: 3 | Status: SHIPPED | OUTPATIENT
Start: 2022-08-26 | End: 2024-02-01 | Stop reason: ALTCHOICE

## 2022-08-26 NOTE — LETTER
August 30, 2022     Mayur Santos, DO  [address] [facility] [address]  [place] [address]    Patient: Renee Bustamante  YOB: 1965  Date of Visit: 8/26/2022      Dear Dr. Santos:    Thank you for referring Renee Bustamante to me for evaluation. Below are my notes for this consultation.    If you have questions, please do not hesitate to call me. I look forward to following your patient along with you.         Sincerely,        Gilberto Carmona MD        CC: No Recipients  Gilberto Carmona MD  8/30/2022  7:25 PM  Signed       Electrophysiology  Outpatient Consult Note         HPI   Renee Bustamante is a 56 y.o. female who is seen today for further evaluation of palpitations and possible arrhythmia. She presented to the ED with palpitations and fatigue.  She woke up on Wednesday morning with feelings of nausea and fatigue.  Throughout the day she felt palpitations that are best described as frequent single thumps.  She denied chest heaviness and shortness of breath.  Her  was able to take a pulse at 1 point and felt the rhythm to be regular with occasional skipped beats, and a neighbor who is a nurse but over stethoscope and found similar findings.  Both denied any evidence of an irregularly irregular rhythm.    Her symptoms persisted through most of Wednesday and into Thursday.  She did go to the emergency room and was found to have sinus rhythm with occasional PACs on monitor.  Today she feels well and has only noted rare episodes of mild palpitations.  The nausea and symptoms of fatigue appear to be much improved.    Earliest the night there was an episode of poor sleeping but she not sure which not it was.   She does have a history of mild sleep apnea, but this has not been worked up recently.  Over the Past couple of months she has had persistent GI symptoms of mild nausea intermittently.  Of note, she has been under a bit of stress at home as her daughter recently moved back in.   Interesting, many of her poorly described symptoms of nausea and in such have been ongoing for couple months and her daughter did move back into the home and June.    She had a stress echocardiogram a few years ago for complaint of atypical chest pan. This was found to be normal.     History reviewed. No pertinent past medical history.    History reviewed. No pertinent surgical history.    Allergies: No known allergies    Current Outpatient Medications   Medication Sig Dispense Refill   • ALPRAZolam (XANAX) 0.25 mg tablet One to two tabs 1 hour prior to flight 12 tablet 0   • cholecalciferol, vitamin D3, 5,000 unit (125 mcg) tablet Take 5,000 Units by mouth daily.     • esomeprazole (NexIUM) 40 mg capsule Take 1 capsule (40 mg total) by mouth daily before breakfast. 90 capsule 3   • L.acid/B.animalis,bifidum/FOS (PROBIOTIC COMPLEX ORAL) Take by mouth.     • MINIVELLE 0.05 mg/24 hr Pt reports she is taking oral form, not patch-recent switch     • propranoloL (INDERAL) 10 mg tablet Take 1 tablet (10 mg total) by mouth 4 (four) times a day as needed (palpitations). 30 tablet 3   • venlafaxine XR (EFFEXOR XR) 150 mg 24 hr capsule Take 1 capsule (150 mg total) by mouth daily. 90 capsule 3     No current facility-administered medications for this visit.       Social History     Tobacco Use   • Smoking status: Never Smoker   • Smokeless tobacco: Never Used   Vaping Use   • Vaping Use: Every day   • Start date: 8/12/2019   • Substances: Nicotine, THC, CBD, Flavoring   • Devices: Disposable, Pre-filled or refillable cartridge, Refillable tank, Pre-filled pod   Substance Use Topics   • Alcohol use: Yes       Family History   Problem Relation Age of Onset   • Alzheimer's disease Biological Mother    • Lung disease Biological Mother    • Osteoporosis Biological Mother    • Cancer Biological Father         Bladder cancer   • Hypertension Biological Father        Review of Systems   Constitutional: Positive for malaise/fatigue.    HENT: Negative for stridor.    Cardiovascular: Positive for palpitations.   Gastrointestinal: Positive for nausea.       Objective     Vitals:    08/26/22 0837   BP: 112/76   Pulse: 69   Resp: 16       Physical Exam  Constitutional:       Appearance: Normal appearance.   HENT:      Head: Normocephalic.   Cardiovascular:      Rate and Rhythm: Normal rate and regular rhythm.      Pulses: Normal pulses.      Heart sounds: Normal heart sounds.   Pulmonary:      Effort: Pulmonary effort is normal.      Breath sounds: Normal breath sounds.   Musculoskeletal:         General: Normal range of motion.      Cervical back: Normal range of motion.   Skin:     General: Skin is warm and dry.   Neurological:      General: No focal deficit present.      Mental Status: She is alert.          Labs   Lab Results   Component Value Date    WBC 9.29 08/24/2022    HGB 13.2 08/24/2022    HCT 40.2 08/24/2022     08/24/2022    CHOL 215 (H) 05/19/2022    TRIG 307 (H) 05/19/2022    HDL 53 05/19/2022    ALT 19 08/24/2022    AST 16 08/24/2022     (L) 08/24/2022    K 4.3 08/24/2022     08/24/2022    CREATININE 0.7 08/24/2022    BUN 17 08/24/2022    CO2 27 08/24/2022    TSH 1.47 08/24/2022     STRESS ECHO 8/24/2020  Study Details Technically adequate quality study.   Stress Findings An exercise stress test was performed following the Damion protocol.   The patient reported no symptoms and no angina during the stress test. The angina index was 0. , with the patient reaching stage 3.   Blood pressure and heart rate demonstrated a normal response to exercise. The patient demonstrated good exercise capacity.  The patient achieved the target heart rate.   Response to Stress There was no ST segment deviation noted during stress. There were no arrhythmias during stress.  Rare PVCs  noted during recovery.   Stress Conclusions Stress ECG does not meet criteria for ischemia. Post-stress echocardiogram does not meet criteria for ischemia.  All ventricular walls become hyperdynamic and the ejection fraction improves.   Study Impression Stress echocardiogram does not meet criteria for myocardial ischemia.        Resting Echo Findings  Left Ventricle Normal ventricle size. Normal wall thickness. Estimated EF 55- 60%.  Normal septal motion. No regional wall motion abnormalities. Normal diastolic filling pattern for age.   Right Ventricle The right ventricle is normal. Preserved right ventricular systolic function.   Left Atrium LA volume = 28.50 cm3.   Right Atrium Normal-sized right atrium.   Aortic Valve Grossly normal aortic valve.   Mitral Valve Grossly normal mitral valve leaflet structure.   Tricuspid Valve Tricuspid valve appears grossly normal.   Pulmonic Valve Trace pulmonic valve regurgitation.   Pericardium Pericardium appears grossly normal. No pericardial effusion.  . no pleural effusions. No ascites present.        ECG 8/24/2022 (ER)-sinus rhythm with occasional PAC.    ECG Sinus rhythm      Assessment/Plan   Problem List Items Addressed This Visit        Circulatory    Premature contractions, supraventricular - Primary     The patient has symptoms of palpitations which are most consistent with extrasystoles.  She had a marked increase in the episodes along with symptoms of fatigue and mild nausea throughout the day on Wednesday and Thursday.    I suspect her symptomatic ectopy was brought on by what ever is causing the fatigue and nausea, rather than vice versa.  After long discussion, it does seem that she has been under a bit of stress and may have had a poor night of sleeping preps Tuesday night which may have led to the increased fatigue and symptoms.  Her mild nausea has been ongoing for couple months and may correlate with when her increased stress from her daughter moving back began.    I prescribed propranolol 10 mg q. 4-6 hours as needed for times when her ectopy is bothersome.  Her daughter assumed to move-in with a friend, and  this may help alleviate some of the stress at home.  If she were to have continued symptoms of palpitations or any change in duration or frequency then further monitoring may be warranted.    We did discuss the potential of her symptoms and due to an arrhythmia such as atrial fibrillation, but both her  and a nurse neighbor both felt that her heart was quite regular between the ectopy there is no evidence of atrial fibrillation when she presented to the emergency room.  Nonetheless, she is at low risk of CVA and I would not necessarily initiate any treatment at this time unless symptoms recurred.           Relevant Medications    propranoloL (INDERAL) 10 mg tablet    Other Relevant Orders    ECG 12 LEAD-OFFICE PERFORMED (Completed)          Gilberto Carmona MD  8/30/2022

## 2022-08-30 PROBLEM — I49.1 PREMATURE CONTRACTIONS, SUPRAVENTRICULAR: Status: ACTIVE | Noted: 2022-08-30

## 2022-08-30 ASSESSMENT — ENCOUNTER SYMPTOMS
STRIDOR: 0
NAUSEA: 1
PALPITATIONS: 1

## 2022-08-30 NOTE — ASSESSMENT & PLAN NOTE
The patient has symptoms of palpitations which are most consistent with extrasystoles.  She had a marked increase in the episodes along with symptoms of fatigue and mild nausea throughout the day on Wednesday and Thursday.    I suspect her symptomatic ectopy was brought on by what ever is causing the fatigue and nausea, rather than vice versa.  After long discussion, it does seem that she has been under a bit of stress and may have had a poor night of sleeping preps Tuesday night which may have led to the increased fatigue and symptoms.  Her mild nausea has been ongoing for couple months and may correlate with when her increased stress from her daughter moving back began.    I prescribed propranolol 10 mg q. 4-6 hours as needed for times when her ectopy is bothersome.  Her daughter assumed to move-in with a friend, and this may help alleviate some of the stress at home.  If she were to have continued symptoms of palpitations or any change in duration or frequency then further monitoring may be warranted.    We did discuss the potential of her symptoms and due to an arrhythmia such as atrial fibrillation, but both her  and a nurse neighbor both felt that her heart was quite regular between the ectopy there is no evidence of atrial fibrillation when she presented to the emergency room.  Nonetheless, she is at low risk of CVA and I would not necessarily initiate any treatment at this time unless symptoms recurred.

## 2022-08-30 NOTE — PROGRESS NOTES
Electrophysiology  Outpatient Consult Note         HPI   Renee Bustamante is a 56 y.o. female who is seen today for further evaluation of palpitations and possible arrhythmia. She presented to the ED with palpitations and fatigue.  She woke up on Wednesday morning with feelings of nausea and fatigue.  Throughout the day she felt palpitations that are best described as frequent single thumps.  She denied chest heaviness and shortness of breath.  Her  was able to take a pulse at 1 point and felt the rhythm to be regular with occasional skipped beats, and a neighbor who is a nurse but over stethoscope and found similar findings.  Both denied any evidence of an irregularly irregular rhythm.    Her symptoms persisted through most of Wednesday and into Thursday.  She did go to the emergency room and was found to have sinus rhythm with occasional PACs on monitor.  Today she feels well and has only noted rare episodes of mild palpitations.  The nausea and symptoms of fatigue appear to be much improved.    Earliest the night there was an episode of poor sleeping but she not sure which not it was.   She does have a history of mild sleep apnea, but this has not been worked up recently.  Over the Past couple of months she has had persistent GI symptoms of mild nausea intermittently.  Of note, she has been under a bit of stress at home as her daughter recently moved back in.  Interesting, many of her poorly described symptoms of nausea and in such have been ongoing for couple months and her daughter did move back into the home and June.    She had a stress echocardiogram a few years ago for complaint of atypical chest pan. This was found to be normal.     History reviewed. No pertinent past medical history.    History reviewed. No pertinent surgical history.    Allergies: No known allergies    Current Outpatient Medications   Medication Sig Dispense Refill   • ALPRAZolam (XANAX) 0.25 mg tablet One to two tabs 1  hour prior to flight 12 tablet 0   • cholecalciferol, vitamin D3, 5,000 unit (125 mcg) tablet Take 5,000 Units by mouth daily.     • esomeprazole (NexIUM) 40 mg capsule Take 1 capsule (40 mg total) by mouth daily before breakfast. 90 capsule 3   • L.acid/B.animalis,bifidum/FOS (PROBIOTIC COMPLEX ORAL) Take by mouth.     • MINIVELLE 0.05 mg/24 hr Pt reports she is taking oral form, not patch-recent switch     • propranoloL (INDERAL) 10 mg tablet Take 1 tablet (10 mg total) by mouth 4 (four) times a day as needed (palpitations). 30 tablet 3   • venlafaxine XR (EFFEXOR XR) 150 mg 24 hr capsule Take 1 capsule (150 mg total) by mouth daily. 90 capsule 3     No current facility-administered medications for this visit.       Social History     Tobacco Use   • Smoking status: Never Smoker   • Smokeless tobacco: Never Used   Vaping Use   • Vaping Use: Every day   • Start date: 8/12/2019   • Substances: Nicotine, THC, CBD, Flavoring   • Devices: Disposable, Pre-filled or refillable cartridge, Refillable tank, Pre-filled pod   Substance Use Topics   • Alcohol use: Yes       Family History   Problem Relation Age of Onset   • Alzheimer's disease Biological Mother    • Lung disease Biological Mother    • Osteoporosis Biological Mother    • Cancer Biological Father         Bladder cancer   • Hypertension Biological Father        Review of Systems   Constitutional: Positive for malaise/fatigue.   HENT: Negative for stridor.    Cardiovascular: Positive for palpitations.   Gastrointestinal: Positive for nausea.       Objective     Vitals:    08/26/22 0837   BP: 112/76   Pulse: 69   Resp: 16       Physical Exam  Constitutional:       Appearance: Normal appearance.   HENT:      Head: Normocephalic.   Cardiovascular:      Rate and Rhythm: Normal rate and regular rhythm.      Pulses: Normal pulses.      Heart sounds: Normal heart sounds.   Pulmonary:      Effort: Pulmonary effort is normal.      Breath sounds: Normal breath sounds.    Musculoskeletal:         General: Normal range of motion.      Cervical back: Normal range of motion.   Skin:     General: Skin is warm and dry.   Neurological:      General: No focal deficit present.      Mental Status: She is alert.          Labs   Lab Results   Component Value Date    WBC 9.29 08/24/2022    HGB 13.2 08/24/2022    HCT 40.2 08/24/2022     08/24/2022    CHOL 215 (H) 05/19/2022    TRIG 307 (H) 05/19/2022    HDL 53 05/19/2022    ALT 19 08/24/2022    AST 16 08/24/2022     (L) 08/24/2022    K 4.3 08/24/2022     08/24/2022    CREATININE 0.7 08/24/2022    BUN 17 08/24/2022    CO2 27 08/24/2022    TSH 1.47 08/24/2022     STRESS ECHO 8/24/2020  Study Details Technically adequate quality study.   Stress Findings An exercise stress test was performed following the Damion protocol.   The patient reported no symptoms and no angina during the stress test. The angina index was 0. , with the patient reaching stage 3.   Blood pressure and heart rate demonstrated a normal response to exercise. The patient demonstrated good exercise capacity.  The patient achieved the target heart rate.   Response to Stress There was no ST segment deviation noted during stress. There were no arrhythmias during stress.  Rare PVCs  noted during recovery.   Stress Conclusions Stress ECG does not meet criteria for ischemia. Post-stress echocardiogram does not meet criteria for ischemia. All ventricular walls become hyperdynamic and the ejection fraction improves.   Study Impression Stress echocardiogram does not meet criteria for myocardial ischemia.        Resting Echo Findings  Left Ventricle Normal ventricle size. Normal wall thickness. Estimated EF 55- 60%.  Normal septal motion. No regional wall motion abnormalities. Normal diastolic filling pattern for age.   Right Ventricle The right ventricle is normal. Preserved right ventricular systolic function.   Left Atrium LA volume = 28.50 cm3.   Right Atrium  Normal-sized right atrium.   Aortic Valve Grossly normal aortic valve.   Mitral Valve Grossly normal mitral valve leaflet structure.   Tricuspid Valve Tricuspid valve appears grossly normal.   Pulmonic Valve Trace pulmonic valve regurgitation.   Pericardium Pericardium appears grossly normal. No pericardial effusion.  . no pleural effusions. No ascites present.        ECG 8/24/2022 (ER)-sinus rhythm with occasional PAC.    ECG Sinus rhythm      Assessment/Plan   Problem List Items Addressed This Visit        Circulatory    Premature contractions, supraventricular - Primary     The patient has symptoms of palpitations which are most consistent with extrasystoles.  She had a marked increase in the episodes along with symptoms of fatigue and mild nausea throughout the day on Wednesday and Thursday.    I suspect her symptomatic ectopy was brought on by what ever is causing the fatigue and nausea, rather than vice versa.  After long discussion, it does seem that she has been under a bit of stress and may have had a poor night of sleeping preps Tuesday night which may have led to the increased fatigue and symptoms.  Her mild nausea has been ongoing for couple months and may correlate with when her increased stress from her daughter moving back began.    I prescribed propranolol 10 mg q. 4-6 hours as needed for times when her ectopy is bothersome.  Her daughter assumed to move-in with a friend, and this may help alleviate some of the stress at home.  If she were to have continued symptoms of palpitations or any change in duration or frequency then further monitoring may be warranted.    We did discuss the potential of her symptoms and due to an arrhythmia such as atrial fibrillation, but both her  and a nurse neighbor both felt that her heart was quite regular between the ectopy there is no evidence of atrial fibrillation when she presented to the emergency room.  Nonetheless, she is at low risk of CVA and I would  not necessarily initiate any treatment at this time unless symptoms recurred.           Relevant Medications    propranoloL (INDERAL) 10 mg tablet    Other Relevant Orders    ECG 12 LEAD-OFFICE PERFORMED (Completed)          Gilberto Carmona MD  8/30/2022

## 2022-09-09 PROBLEM — G47.33 OBSTRUCTIVE SLEEP APNEA SYNDROME: Status: ACTIVE | Noted: 2022-09-09

## 2022-09-09 RX ORDER — ALPRAZOLAM 0.25 MG/1
TABLET ORAL
Qty: 12 TABLET | Refills: 0 | Status: SHIPPED | OUTPATIENT
Start: 2022-09-09 | End: 2024-02-21 | Stop reason: SDUPTHER

## 2022-09-09 RX ORDER — ESTRADIOL 1 MG/1
TABLET ORAL
COMMUNITY
Start: 2022-08-24 | End: 2022-09-09 | Stop reason: SDUPTHER

## 2022-11-28 ENCOUNTER — TELEPHONE (OUTPATIENT)
Dept: PRIMARY CARE | Facility: CLINIC | Age: 57
End: 2022-11-28

## 2022-11-28 NOTE — TELEPHONE ENCOUNTER
[facility] Appointment Request   Provider:Dr King nguyen  Appointment Type: pre opt  Reason for Visit: pre opt, surgery is z-plasty after moh's surgery, sugery is Dec. 16,2022 Surgeon  Is Nishant Lamb, Surgery is at [facility]  Available Day and Time: Mondays are wide open for patient  Best Contact Number  [phone]    The practice will reach out to schedule your appointment within the next 2 business days.

## 2022-12-06 ENCOUNTER — CONSULT (OUTPATIENT)
Dept: PRIMARY CARE | Facility: CLINIC | Age: 57
End: 2022-12-06
Payer: COMMERCIAL

## 2022-12-06 VITALS
BODY MASS INDEX: 25.9 KG/M2 | WEIGHT: 165 LBS | TEMPERATURE: 97.6 F | DIASTOLIC BLOOD PRESSURE: 70 MMHG | SYSTOLIC BLOOD PRESSURE: 126 MMHG | HEART RATE: 82 BPM | HEIGHT: 67 IN | OXYGEN SATURATION: 98 % | RESPIRATION RATE: 16 BRPM

## 2022-12-06 DIAGNOSIS — G47.33 OSA (OBSTRUCTIVE SLEEP APNEA): ICD-10-CM

## 2022-12-06 DIAGNOSIS — Z01.818 PRE-OP TESTING: Primary | ICD-10-CM

## 2022-12-06 PROCEDURE — 3008F BODY MASS INDEX DOCD: CPT | Performed by: FAMILY MEDICINE

## 2022-12-06 PROCEDURE — 93000 ELECTROCARDIOGRAM COMPLETE: CPT | Performed by: FAMILY MEDICINE

## 2022-12-06 PROCEDURE — 99214 OFFICE O/P EST MOD 30 MIN: CPT | Performed by: FAMILY MEDICINE

## 2022-12-06 ASSESSMENT — ENCOUNTER SYMPTOMS
ADENOPATHY: 0
HEADACHES: 0
DIFFICULTY URINATING: 0
NERVOUS/ANXIOUS: 0
CHEST TIGHTNESS: 0
ARTHRALGIAS: 0
EYE DISCHARGE: 0
SLEEP DISTURBANCE: 0
SINUS PAIN: 0
DIZZINESS: 0
EYE PAIN: 0
BACK PAIN: 0
APPETITE CHANGE: 0
FLANK PAIN: 0
TROUBLE SWALLOWING: 0
ABDOMINAL PAIN: 0
DECREASED CONCENTRATION: 0
SHORTNESS OF BREATH: 0
PALPITATIONS: 0
FATIGUE: 0

## 2022-12-06 NOTE — PROGRESS NOTES
Daily Progress Note      Subjective      Patient ID: Renee Bustamante is a 57 y.o. female.    HPI  For pre-op    The following have been reviewed and updated as appropriate in this visit:   Problems       Review of Systems   Constitutional: Negative for appetite change and fatigue.   HENT: Negative for ear pain, sinus pain and trouble swallowing.    Eyes: Negative for pain and discharge.   Respiratory: Negative for chest tightness and shortness of breath.    Cardiovascular: Negative for chest pain and palpitations.   Gastrointestinal: Negative for abdominal pain.   Genitourinary: Negative for difficulty urinating, flank pain, menstrual problem and vaginal bleeding.   Musculoskeletal: Negative for arthralgias, back pain and gait problem.   Skin: Negative for rash.   Neurological: Negative for dizziness, syncope and headaches.   Hematological: Negative for adenopathy.   Psychiatric/Behavioral: Negative for decreased concentration and sleep disturbance. The patient is not nervous/anxious.        Current Outpatient Medications   Medication Sig Dispense Refill   • ALPRAZolam (XANAX) 0.25 mg tablet One to two tabs 1 hour prior to flight 12 tablet 0   • cholecalciferol, vitamin D3, 5,000 unit (125 mcg) tablet Take 5,000 Units by mouth daily.     • esomeprazole (NexIUM) 40 mg capsule Take 1 capsule (40 mg total) by mouth daily before breakfast. 90 capsule 3   • L.acid/B.animalis,bifidum/FOS (PROBIOTIC COMPLEX ORAL) Take by mouth.     • MINIVELLE 0.05 mg/24 hr Pt reports she is taking oral form, not patch-recent switch     • propranoloL (INDERAL) 10 mg tablet Take 1 tablet (10 mg total) by mouth 4 (four) times a day as needed (palpitations). 30 tablet 3   • venlafaxine XR (EFFEXOR XR) 150 mg 24 hr capsule Take 1 capsule (150 mg total) by mouth daily. 90 capsule 3     No current facility-administered medications for this visit.     History reviewed. No pertinent past medical history.  Family History   Problem Relation  Age of Onset   • Alzheimer's disease Biological Mother    • Lung disease Biological Mother    • Osteoporosis Biological Mother    • Cancer Biological Father         Bladder cancer   • Hypertension Biological Father      History reviewed. No pertinent surgical history.  Social History     Socioeconomic History   • Marital status:      Spouse name: Not on file   • Number of children: Not on file   • Years of education: Not on file   • Highest education level: Not on file   Occupational History   • Not on file   Tobacco Use   • Smoking status: Never   • Smokeless tobacco: Never   Vaping Use   • Vaping Use: Every day   • Start date: 8/12/2019   • Substances: Nicotine, THC, CBD, Flavoring   • Devices: Disposable, Pre-filled or refillable cartridge, Refillable tank, Pre-filled pod   Substance and Sexual Activity   • Alcohol use: Yes   • Drug use: Not on file   • Sexual activity: Not on file   Other Topics Concern   • Not on file   Social History Narrative   • Not on file     Social Determinants of Health     Financial Resource Strain: Not on file   Food Insecurity: No Food Insecurity   • Worried About Running Out of Food in the Last Year: Never true   • Ran Out of Food in the Last Year: Never true   Transportation Needs: Not on file   Physical Activity: Not on file   Stress: Not on file   Social Connections: Not on file   Intimate Partner Violence: Not on file   Housing Stability: Not on file     Allergies   Allergen Reactions   • No Known Allergies        Objective   No new labs.    Physical Exam  Constitutional:       Appearance: Normal appearance. She is well-developed and well-nourished.   HENT:      Head: Normocephalic and atraumatic.   Eyes:      General: Lids are normal.      Extraocular Movements: EOM normal.      Conjunctiva/sclera: Conjunctivae normal.      Pupils: Pupils are equal, round, and reactive to light.   Neck:      Trachea: Trachea normal.   Cardiovascular:      Rate and Rhythm: Normal rate and  regular rhythm.      Pulses: Intact distal pulses.      Heart sounds: Normal heart sounds.   Pulmonary:      Effort: Pulmonary effort is normal.      Breath sounds: Normal breath sounds. No wheezing.   Abdominal:      General: Bowel sounds are normal.      Palpations: Abdomen is soft. There is no mass.      Tenderness: There is no abdominal tenderness. There is no guarding or rebound.   Musculoskeletal:         General: Normal range of motion.      Cervical back: Full passive range of motion without pain and normal range of motion.   Lymphadenopathy:      Cervical: No cervical adenopathy.   Skin:     General: Skin is warm, dry and intact.   Neurological:      Mental Status: She is alert and oriented to person, place, and time.   Psychiatric:         Mood and Affect: Mood and affect normal.         Speech: Speech normal.         Behavior: Behavior normal.         Thought Content: Thought content normal.         Judgment: Judgment normal.         Assessment/Plan     Problem List Items Addressed This Visit        Nervous    CORTEZ (obstructive sleep apnea)   Other Visit Diagnoses     Pre-op testing    -  Primary    Relevant Orders    ECG 12 LEAD OFFICE PERFORMED (Completed)        Orders Placed This Encounter   Procedures   • ECG 12 LEAD OFFICE PERFORMED     Scheduling Instructions:      PLEASE USE THIS ORDER FOR ECG'S PERFORMED IN PHYSICIAN OFFICES     Order Specific Question:   Release to patient     Answer:   Immediate     For pre-op  Diaz class 1  ekg wnl  Forms completed    Mayur Santos DO  12/6/2022

## 2023-01-16 ENCOUNTER — TRANSCRIBE ORDERS (OUTPATIENT)
Dept: SCHEDULING | Age: 58
End: 2023-01-16

## 2023-01-16 DIAGNOSIS — N95.0 POSTMENOPAUSAL BLEEDING: Primary | ICD-10-CM

## 2023-01-19 ENCOUNTER — HOSPITAL ENCOUNTER (OUTPATIENT)
Dept: RADIOLOGY | Facility: CLINIC | Age: 58
Discharge: HOME | End: 2023-01-19
Attending: SPECIALIST
Payer: COMMERCIAL

## 2023-01-19 DIAGNOSIS — N95.0 POSTMENOPAUSAL BLEEDING: ICD-10-CM

## 2023-01-19 PROCEDURE — 76856 US EXAM PELVIC COMPLETE: CPT

## 2023-01-26 ENCOUNTER — APPOINTMENT (OUTPATIENT)
Dept: URBAN - METROPOLITAN AREA CLINIC 203 | Age: 58
Setting detail: DERMATOLOGY
End: 2023-01-30

## 2023-01-26 DIAGNOSIS — L57.8 OTHER SKIN CHANGES DUE TO CHRONIC EXPOSURE TO NONIONIZING RADIATION: ICD-10-CM

## 2023-01-26 DIAGNOSIS — Z85.828 PERSONAL HISTORY OF OTHER MALIGNANT NEOPLASM OF SKIN: ICD-10-CM

## 2023-01-26 DIAGNOSIS — L70.0 ACNE VULGARIS: ICD-10-CM

## 2023-01-26 DIAGNOSIS — L57.0 ACTINIC KERATOSIS: ICD-10-CM

## 2023-01-26 DIAGNOSIS — D22 MELANOCYTIC NEVI: ICD-10-CM

## 2023-01-26 PROBLEM — D22.5 MELANOCYTIC NEVI OF TRUNK: Status: ACTIVE | Noted: 2023-01-26

## 2023-01-26 PROCEDURE — OTHER COUNSELING: OTHER

## 2023-01-26 PROCEDURE — 17003 DESTRUCT PREMALG LES 2-14: CPT

## 2023-01-26 PROCEDURE — OTHER REASSURANCE: OTHER

## 2023-01-26 PROCEDURE — OTHER LIQUID NITROGEN: OTHER

## 2023-01-26 PROCEDURE — 17000 DESTRUCT PREMALG LESION: CPT

## 2023-01-26 PROCEDURE — 99214 OFFICE O/P EST MOD 30 MIN: CPT | Mod: 25

## 2023-01-26 PROCEDURE — OTHER SUNSCREEN RECOMMENDATIONS: OTHER

## 2023-01-26 PROCEDURE — OTHER PRESCRIPTION MEDICATION MANAGEMENT: OTHER

## 2023-01-26 ASSESSMENT — LOCATION DETAILED DESCRIPTION DERM
LOCATION DETAILED: PERIUMBILICAL SKIN
LOCATION DETAILED: LEFT SUPERIOR MEDIAL FOREHEAD
LOCATION DETAILED: LEFT SUPERIOR FOREHEAD
LOCATION DETAILED: RIGHT FOREHEAD
LOCATION DETAILED: EPIGASTRIC SKIN
LOCATION DETAILED: LEFT MEDIAL BREAST 10-11:00 REGION
LOCATION DETAILED: RIGHT INFERIOR MEDIAL FOREHEAD
LOCATION DETAILED: LEFT INFERIOR CENTRAL MALAR CHEEK
LOCATION DETAILED: LEFT NASAL ALA
LOCATION DETAILED: LEFT MEDIAL BREAST 11-12:00 REGION
LOCATION DETAILED: RIGHT ANTERIOR MEDIAL DISTAL UPPER ARM
LOCATION DETAILED: LEFT INFERIOR MEDIAL UPPER BACK
LOCATION DETAILED: RIGHT INFERIOR CENTRAL MALAR CHEEK

## 2023-01-26 ASSESSMENT — LOCATION SIMPLE DESCRIPTION DERM
LOCATION SIMPLE: LEFT NOSE
LOCATION SIMPLE: RIGHT FOREHEAD
LOCATION SIMPLE: ABDOMEN
LOCATION SIMPLE: RIGHT CHEEK
LOCATION SIMPLE: LEFT UPPER BACK
LOCATION SIMPLE: RIGHT UPPER ARM
LOCATION SIMPLE: LEFT CHEEK
LOCATION SIMPLE: LEFT BREAST
LOCATION SIMPLE: LEFT FOREHEAD

## 2023-01-26 ASSESSMENT — LOCATION ZONE DERM
LOCATION ZONE: ARM
LOCATION ZONE: NOSE
LOCATION ZONE: TRUNK
LOCATION ZONE: FACE

## 2023-01-26 ASSESSMENT — PAIN INTENSITY VAS: HOW INTENSE IS YOUR PAIN 0 BEING NO PAIN, 10 BEING THE MOST SEVERE PAIN POSSIBLE?: NO PAIN

## 2023-01-26 NOTE — PROCEDURE: LIQUID NITROGEN
Detail Level: Detailed
Render Post-Care Instructions In Note?: no
Duration Of Freeze Thaw-Cycle (Seconds): 0
Post-Care Instructions: I reviewed with the patient in detail post-care instructions. Patient is to wear sunprotection, and avoid picking at any of the treated lesions. Pt may apply Vaseline to crusted or scabbing areas.
Application Tool (Optional): Liquid Nitrogen Sprayer
Show Applicator Variable?: Yes
Consent: The patient's consent was obtained including but not limited to risks of crusting, scabbing, blistering, scarring, darker or lighter pigmentary change, recurrence, incomplete removal and infection.

## 2023-02-11 ENCOUNTER — TELEPHONE (OUTPATIENT)
Dept: PRIMARY CARE | Facility: CLINIC | Age: 58
End: 2023-02-11
Payer: COMMERCIAL

## 2023-02-11 RX ORDER — BENZONATATE 200 MG/1
200 CAPSULE ORAL 3 TIMES DAILY PRN
Qty: 30 CAPSULE | Refills: 0 | Status: SHIPPED | OUTPATIENT
Start: 2023-02-11 | End: 2023-02-21

## 2023-02-11 RX ORDER — AZITHROMYCIN 250 MG/1
TABLET, FILM COATED ORAL
Qty: 6 TABLET | Refills: 0 | Status: SHIPPED | OUTPATIENT
Start: 2023-02-11 | End: 2023-02-16

## 2023-06-08 RX ORDER — ESOMEPRAZOLE MAGNESIUM 40 MG/1
40 CAPSULE, DELAYED RELEASE ORAL
Qty: 30 CAPSULE | Refills: 11 | Status: SHIPPED | OUTPATIENT
Start: 2023-06-08 | End: 2024-01-29 | Stop reason: SDUPTHER

## 2023-06-08 NOTE — TELEPHONE ENCOUNTER
Medicine Refill Request    Last Office: Visit date not found   Last Consult Visit: Visit date not found  Last Telemedicine Visit: Visit date not found    Next Appointment: Visit date not found      Current Outpatient Medications:   •  ALPRAZolam (XANAX) 0.25 mg tablet, One to two tabs 1 hour prior to flight, Disp: 12 tablet, Rfl: 0  •  cholecalciferol, vitamin D3, 5,000 unit (125 mcg) tablet, Take 5,000 Units by mouth daily., Disp: , Rfl:   •  esomeprazole (NexIUM) 40 mg capsule, Take 1 capsule (40 mg total) by mouth daily before breakfast., Disp: 90 capsule, Rfl: 3  •  L.acid/B.animalis,bifidum/FOS (PROBIOTIC COMPLEX ORAL), Take by mouth., Disp: , Rfl:   •  MINIVELLE 0.05 mg/24 hr, Pt reports she is taking oral form, not patch-recent switch, Disp: , Rfl:   •  propranoloL (INDERAL) 10 mg tablet, Take 1 tablet (10 mg total) by mouth 4 (four) times a day as needed (palpitations)., Disp: 30 tablet, Rfl: 3  •  venlafaxine XR (EFFEXOR XR) 150 mg 24 hr capsule, Take 1 capsule (150 mg total) by mouth daily., Disp: 90 capsule, Rfl: 3      BP Readings from Last 3 Encounters:   12/06/22 126/70   08/26/22 112/76   08/24/22 131/71       Recent Lab results:  Lab Results   Component Value Date    CHOL 215 (H) 05/19/2022   ,   Lab Results   Component Value Date    HDL 53 05/19/2022   ,   Lab Results   Component Value Date    LDLCALC 110 (H) 05/19/2022   ,   Lab Results   Component Value Date    TRIG 307 (H) 05/19/2022        Lab Results   Component Value Date    GLUCOSE 97 08/24/2022   , No results found for: HGBA1C      Lab Results   Component Value Date    CREATININE 0.7 08/24/2022       Lab Results   Component Value Date    TSH 1.47 08/24/2022

## 2023-06-12 ENCOUNTER — TRANSCRIBE ORDERS (OUTPATIENT)
Dept: SCHEDULING | Age: 58
End: 2023-06-12

## 2023-06-13 ENCOUNTER — TRANSCRIBE ORDERS (OUTPATIENT)
Dept: ADMINISTRATIVE | Age: 58
End: 2023-06-13

## 2023-06-13 DIAGNOSIS — T84.022A INSTABILITY OF INTERNAL RIGHT KNEE PROSTHESIS, INITIAL ENCOUNTER (CMS/HCC): Primary | ICD-10-CM

## 2023-06-24 ENCOUNTER — HOSPITAL ENCOUNTER (OUTPATIENT)
Dept: RADIOLOGY | Age: 58
Discharge: HOME | End: 2023-06-24
Attending: ORTHOPAEDIC SURGERY
Payer: COMMERCIAL

## 2023-06-24 DIAGNOSIS — T84.022A INSTABILITY OF INTERNAL RIGHT KNEE PROSTHESIS, INITIAL ENCOUNTER (CMS/HCC): ICD-10-CM

## 2023-06-26 ENCOUNTER — TELEPHONE (OUTPATIENT)
Dept: PRIMARY CARE | Facility: CLINIC | Age: 58
End: 2023-06-26
Payer: COMMERCIAL

## 2023-06-26 RX ORDER — HYOSCYAMINE SULFATE 0.12 MG/1
0.12 TABLET SUBLINGUAL EVERY 4 HOURS PRN
Qty: 30 TABLET | Refills: 0 | Status: SHIPPED | OUTPATIENT
Start: 2023-06-26 | End: 2024-02-01 | Stop reason: ALTCHOICE

## 2023-06-27 ENCOUNTER — TELEPHONE (OUTPATIENT)
Dept: PRIMARY CARE | Facility: CLINIC | Age: 58
End: 2023-06-27
Payer: COMMERCIAL

## 2023-06-27 RX ORDER — DICYCLOMINE HYDROCHLORIDE 10 MG/1
10 CAPSULE ORAL 4 TIMES DAILY PRN
Qty: 40 CAPSULE | Refills: 0 | Status: SHIPPED | OUTPATIENT
Start: 2023-06-27 | End: 2024-03-16 | Stop reason: ALTCHOICE

## 2023-06-27 RX ORDER — ESTRADIOL 1 MG/1
TABLET ORAL
COMMUNITY
Start: 2023-06-08 | End: 2024-03-16 | Stop reason: ALTCHOICE

## 2023-07-26 ENCOUNTER — TRANSCRIBE ORDERS (OUTPATIENT)
Dept: SCHEDULING | Age: 58
End: 2023-07-26

## 2023-07-26 DIAGNOSIS — Z12.31 ENCOUNTER FOR SCREENING MAMMOGRAM FOR MALIGNANT NEOPLASM OF BREAST: Primary | ICD-10-CM

## 2023-07-27 ENCOUNTER — APPOINTMENT (OUTPATIENT)
Dept: URBAN - METROPOLITAN AREA CLINIC 203 | Age: 58
Setting detail: DERMATOLOGY
End: 2023-07-28

## 2023-07-27 DIAGNOSIS — L82.1 OTHER SEBORRHEIC KERATOSIS: ICD-10-CM

## 2023-07-27 DIAGNOSIS — L85.3 XEROSIS CUTIS: ICD-10-CM

## 2023-07-27 DIAGNOSIS — L57.0 ACTINIC KERATOSIS: ICD-10-CM

## 2023-07-27 PROCEDURE — OTHER PRESCRIPTION MEDICATION MANAGEMENT: OTHER

## 2023-07-27 PROCEDURE — 99213 OFFICE O/P EST LOW 20 MIN: CPT

## 2023-07-27 PROCEDURE — OTHER REASSURANCE: OTHER

## 2023-07-27 ASSESSMENT — LOCATION SIMPLE DESCRIPTION DERM
LOCATION SIMPLE: LEFT FOOT
LOCATION SIMPLE: RIGHT UPPER BACK
LOCATION SIMPLE: LEFT FOREHEAD
LOCATION SIMPLE: LEFT UPPER BACK
LOCATION SIMPLE: RIGHT CHEEK
LOCATION SIMPLE: RIGHT POSTERIOR THIGH

## 2023-07-27 ASSESSMENT — LOCATION ZONE DERM
LOCATION ZONE: TRUNK
LOCATION ZONE: FACE
LOCATION ZONE: LEG
LOCATION ZONE: FEET

## 2023-07-27 ASSESSMENT — LOCATION DETAILED DESCRIPTION DERM
LOCATION DETAILED: RIGHT MID-UPPER BACK
LOCATION DETAILED: LEFT SUPERIOR MEDIAL FOREHEAD
LOCATION DETAILED: LEFT DORSAL FOOT
LOCATION DETAILED: RIGHT SUPERIOR MEDIAL MALAR CHEEK
LOCATION DETAILED: RIGHT DISTAL POSTERIOR THIGH
LOCATION DETAILED: LEFT MID-UPPER BACK

## 2023-07-27 ASSESSMENT — PAIN INTENSITY VAS: HOW INTENSE IS YOUR PAIN 0 BEING NO PAIN, 10 BEING THE MOST SEVERE PAIN POSSIBLE?: NO PAIN

## 2023-07-27 NOTE — PROCEDURE: PRESCRIPTION MEDICATION MANAGEMENT
Render In Strict Bullet Format?: No
Detail Level: Zone
Initiate Treatment: Aquanil
Samples Given: Cerave

## 2023-08-21 ENCOUNTER — HOSPITAL ENCOUNTER (OUTPATIENT)
Dept: RADIOLOGY | Facility: HOSPITAL | Age: 58
Discharge: HOME | End: 2023-08-21
Attending: SPECIALIST
Payer: COMMERCIAL

## 2023-08-21 DIAGNOSIS — Z12.31 ENCOUNTER FOR SCREENING MAMMOGRAM FOR MALIGNANT NEOPLASM OF BREAST: ICD-10-CM

## 2023-08-21 PROCEDURE — 77063 BREAST TOMOSYNTHESIS BI: CPT

## 2023-10-10 ENCOUNTER — TELEPHONE (OUTPATIENT)
Dept: PRIMARY CARE | Facility: CLINIC | Age: 58
End: 2023-10-10
Payer: COMMERCIAL

## 2023-12-15 ENCOUNTER — TELEPHONE (OUTPATIENT)
Dept: PRIMARY CARE | Facility: CLINIC | Age: 58
End: 2023-12-15
Payer: COMMERCIAL

## 2023-12-15 RX ORDER — AZITHROMYCIN 250 MG/1
TABLET, FILM COATED ORAL
Qty: 6 TABLET | Refills: 0 | Status: SHIPPED | OUTPATIENT
Start: 2023-12-15 | End: 2023-12-20

## 2024-01-29 RX ORDER — ESOMEPRAZOLE MAGNESIUM 40 MG/1
40 CAPSULE, DELAYED RELEASE ORAL
Qty: 30 CAPSULE | Refills: 5 | Status: SHIPPED | OUTPATIENT
Start: 2024-01-29 | End: 2024-06-14 | Stop reason: SDUPTHER

## 2024-01-29 NOTE — TELEPHONE ENCOUNTER
"Last Office Visit: Visit date not found   Last Consult Visit: Visit date not found  Last Telemedicine Visit: Visit date not found    Next Appointment: Visit date not found      Current Outpatient Medications:   •  ALPRAZolam (XANAX) 0.25 mg tablet, One to two tabs 1 hour prior to flight, Disp: 12 tablet, Rfl: 0  •  cholecalciferol, vitamin D3, 5,000 unit (125 mcg) tablet, Take 5,000 Units by mouth daily., Disp: , Rfl:   •  dicyclomine (BENTYL) 10 mg capsule, Take 1 capsule (10 mg total) by mouth 4 (four) times a day as needed (ibs)., Disp: 40 capsule, Rfl: 0  •  esomeprazole (NexIUM) 40 mg capsule, TAKE 1 CAPSULE BY MOUTH EVERY DAY BEFORE BREAKFAST, Disp: 30 capsule, Rfl: 11  •  estradioL (ESTRACE) 1 mg tablet, TAKE 1 TABLET BY MOUTH EVERY DAY FOR 90 DAYS, Disp: , Rfl:   •  hyoscyamine (LEVSIN/SL) 0.125 mg SL tablet, Place 1 tablet (0.125 mg total) under the tongue every 4 (four) hours as needed for cramping for up to 10 days., Disp: 30 tablet, Rfl: 0  •  L.acid/B.animalis,bifidum/FOS (PROBIOTIC COMPLEX ORAL), Take by mouth., Disp: , Rfl:   •  MINIVELLE 0.05 mg/24 hr, Pt reports she is taking oral form, not patch-recent switch, Disp: , Rfl:   •  propranoloL (INDERAL) 10 mg tablet, Take 1 tablet (10 mg total) by mouth 4 (four) times a day as needed (palpitations)., Disp: 30 tablet, Rfl: 3  •  venlafaxine XR (EFFEXOR XR) 150 mg 24 hr capsule, Take 1 capsule (150 mg total) by mouth daily., Disp: 90 capsule, Rfl: 3      BP Readings from Last 3 Encounters:   12/06/22 126/70   08/26/22 112/76   08/24/22 131/71       Recent Lab results:  Lab Results   Component Value Date    CHOL 215 (H) 05/19/2022   ,   Lab Results   Component Value Date    HDL 53 05/19/2022   ,   Lab Results   Component Value Date    LDLCALC 110 (H) 05/19/2022   ,   Lab Results   Component Value Date    TRIG 307 (H) 05/19/2022        Lab Results   Component Value Date    GLUCOSE 97 08/24/2022   , No results found for: \"HGBA1C\"      Lab Results " "  Component Value Date    CREATININE 0.7 08/24/2022       Lab Results   Component Value Date    TSH 1.47 08/24/2022         No results found for: \"HGBA1C\"  "

## 2024-01-30 ENCOUNTER — APPOINTMENT (OUTPATIENT)
Dept: URBAN - METROPOLITAN AREA CLINIC 203 | Age: 59
Setting detail: DERMATOLOGY
End: 2024-01-31

## 2024-01-30 DIAGNOSIS — D18.0 HEMANGIOMA: ICD-10-CM

## 2024-01-30 DIAGNOSIS — L57.8 OTHER SKIN CHANGES DUE TO CHRONIC EXPOSURE TO NONIONIZING RADIATION: ICD-10-CM

## 2024-01-30 DIAGNOSIS — Z85.828 PERSONAL HISTORY OF OTHER MALIGNANT NEOPLASM OF SKIN: ICD-10-CM

## 2024-01-30 DIAGNOSIS — L57.0 ACTINIC KERATOSIS: ICD-10-CM

## 2024-01-30 DIAGNOSIS — L81.4 OTHER MELANIN HYPERPIGMENTATION: ICD-10-CM

## 2024-01-30 DIAGNOSIS — L82.1 OTHER SEBORRHEIC KERATOSIS: ICD-10-CM

## 2024-01-30 PROBLEM — D18.01 HEMANGIOMA OF SKIN AND SUBCUTANEOUS TISSUE: Status: ACTIVE | Noted: 2024-01-30

## 2024-01-30 PROCEDURE — OTHER REASSURANCE: OTHER

## 2024-01-30 PROCEDURE — 17000 DESTRUCT PREMALG LESION: CPT

## 2024-01-30 PROCEDURE — OTHER LIQUID NITROGEN: OTHER

## 2024-01-30 PROCEDURE — OTHER COUNSELING: OTHER

## 2024-01-30 PROCEDURE — OTHER SUNSCREEN RECOMMENDATIONS: OTHER

## 2024-01-30 PROCEDURE — 99213 OFFICE O/P EST LOW 20 MIN: CPT | Mod: 25

## 2024-01-30 ASSESSMENT — LOCATION ZONE DERM
LOCATION ZONE: TRUNK
LOCATION ZONE: LEG
LOCATION ZONE: FACE

## 2024-01-30 ASSESSMENT — LOCATION SIMPLE DESCRIPTION DERM
LOCATION SIMPLE: ABDOMEN
LOCATION SIMPLE: LEFT BREAST
LOCATION SIMPLE: LEFT FOREHEAD
LOCATION SIMPLE: LEFT POSTERIOR THIGH
LOCATION SIMPLE: CHEST

## 2024-01-30 ASSESSMENT — LOCATION DETAILED DESCRIPTION DERM
LOCATION DETAILED: EPIGASTRIC SKIN
LOCATION DETAILED: LEFT MEDIAL BREAST 11-12:00 REGION
LOCATION DETAILED: LEFT SUPERIOR FOREHEAD
LOCATION DETAILED: LEFT MEDIAL BREAST 10-11:00 REGION
LOCATION DETAILED: SUBXIPHOID
LOCATION DETAILED: PERIUMBILICAL SKIN
LOCATION DETAILED: MIDDLE STERNUM
LOCATION DETAILED: LEFT DISTAL POSTERIOR THIGH

## 2024-01-31 RX ORDER — ESOMEPRAZOLE MAGNESIUM 40 MG/1
40 CAPSULE, DELAYED RELEASE ORAL
Qty: 30 CAPSULE | Refills: 5 | Status: CANCELLED | OUTPATIENT
Start: 2024-01-31

## 2024-02-01 RX ORDER — BUPROPION HYDROCHLORIDE 150 MG/1
150 TABLET ORAL DAILY
Qty: 30 TABLET | Refills: 0 | Status: SHIPPED | OUTPATIENT
Start: 2024-02-01 | End: 2024-03-04

## 2024-02-02 ENCOUNTER — TELEPHONE (OUTPATIENT)
Dept: PRIMARY CARE | Facility: CLINIC | Age: 59
End: 2024-02-02
Payer: COMMERCIAL

## 2024-02-21 RX ORDER — ALPRAZOLAM 0.25 MG/1
0.25 TABLET ORAL 3 TIMES DAILY PRN
Qty: 12 TABLET | Refills: 0 | Status: SHIPPED | OUTPATIENT
Start: 2024-02-21 | End: 2024-12-30 | Stop reason: ALTCHOICE

## 2024-03-04 RX ORDER — BUPROPION HYDROCHLORIDE 150 MG/1
150 TABLET ORAL DAILY
Qty: 30 TABLET | Refills: 0 | Status: SHIPPED | OUTPATIENT
Start: 2024-03-04 | End: 2024-03-16 | Stop reason: ALTCHOICE

## 2024-03-14 ENCOUNTER — CONSULT (OUTPATIENT)
Dept: PRIMARY CARE | Facility: CLINIC | Age: 59
End: 2024-03-14
Payer: COMMERCIAL

## 2024-03-14 VITALS
WEIGHT: 168 LBS | HEIGHT: 66 IN | HEART RATE: 62 BPM | BODY MASS INDEX: 27 KG/M2 | SYSTOLIC BLOOD PRESSURE: 132 MMHG | TEMPERATURE: 98.3 F | DIASTOLIC BLOOD PRESSURE: 80 MMHG | OXYGEN SATURATION: 98 %

## 2024-03-14 DIAGNOSIS — Z01.818 PRE-OP EXAM: Primary | ICD-10-CM

## 2024-03-14 PROCEDURE — 3008F BODY MASS INDEX DOCD: CPT | Performed by: NURSE PRACTITIONER

## 2024-03-14 PROCEDURE — 99214 OFFICE O/P EST MOD 30 MIN: CPT | Performed by: NURSE PRACTITIONER

## 2024-03-14 PROCEDURE — 93000 ELECTROCARDIOGRAM COMPLETE: CPT | Performed by: NURSE PRACTITIONER

## 2024-03-14 RX ORDER — VENLAFAXINE HYDROCHLORIDE 75 MG/1
CAPSULE, EXTENDED RELEASE ORAL
COMMUNITY
Start: 2024-02-06 | End: 2024-03-16 | Stop reason: DRUGHIGH

## 2024-03-14 ASSESSMENT — ENCOUNTER SYMPTOMS
APPETITE CHANGE: 0
DECREASED CONCENTRATION: 0
EYE ITCHING: 0
COUGH: 0
BRUISES/BLEEDS EASILY: 0
NECK STIFFNESS: 0
BACK PAIN: 0
CONSTIPATION: 0
DIFFICULTY URINATING: 0
FEVER: 0
SLEEP DISTURBANCE: 0
ABDOMINAL DISTENTION: 0
NERVOUS/ANXIOUS: 0
FREQUENCY: 0
SORE THROAT: 0
MYALGIAS: 1
FATIGUE: 0
ACTIVITY CHANGE: 0
SINUS PAIN: 0
SHORTNESS OF BREATH: 0
NAUSEA: 0
DIARRHEA: 0
EYE DISCHARGE: 0
RHINORRHEA: 0
HEADACHES: 0
TROUBLE SWALLOWING: 0
SINUS PRESSURE: 0
NUMBNESS: 0
ABDOMINAL PAIN: 0
WEAKNESS: 0
ARTHRALGIAS: 1
NECK PAIN: 0

## 2024-03-14 NOTE — PROGRESS NOTES
"      Subjective      Patient ID: Renee Bustamante is a 58 y.o. female.  1965      HPI  Preop evaluation for right knee replacement with Dr Dionicio Crystal.   Denies SOB, MARTINEZ, CP,palpitations, dizziness.       The following have been reviewed and updated as appropriate in this visit:        Review of Systems   Constitutional: Negative for activity change, appetite change, fatigue and fever.   HENT: Negative for congestion, ear pain, rhinorrhea, sinus pressure, sinus pain, sore throat and trouble swallowing.    Eyes: Negative for discharge, itching and visual disturbance.   Respiratory: Negative for cough and shortness of breath.    Cardiovascular: Negative for chest pain and leg swelling.   Gastrointestinal: Negative for abdominal distention, abdominal pain, constipation, diarrhea and nausea.   Endocrine: Negative for cold intolerance and heat intolerance.   Genitourinary: Negative for difficulty urinating, frequency and urgency.   Musculoskeletal: Positive for arthralgias and myalgias. Negative for back pain, neck pain and neck stiffness.   Skin: Negative for rash.   Allergic/Immunologic: Negative for environmental allergies.   Neurological: Negative for weakness, numbness and headaches.   Hematological: Does not bruise/bleed easily.   Psychiatric/Behavioral: Negative for decreased concentration and sleep disturbance. The patient is not nervous/anxious.        Objective     Vitals:    03/14/24 1100   BP: 132/80   Pulse: 62   Temp: 36.8 °C (98.3 °F)   SpO2: 98%   Weight: 76.2 kg (168 lb)   Height: 1.676 m (5' 6\")     Body mass index is 27.12 kg/m².    Physical Exam  Vitals reviewed.   Constitutional:       Appearance: She is well-developed.   HENT:      Head: Normocephalic and atraumatic.      Right Ear: Tympanic membrane, ear canal and external ear normal.      Left Ear: Tympanic membrane, ear canal and external ear normal.      Nose: Nose normal.   Eyes:      Conjunctiva/sclera: Conjunctivae normal.      " Pupils: Pupils are equal, round, and reactive to light.   Cardiovascular:      Rate and Rhythm: Normal rate and regular rhythm.      Heart sounds: Normal heart sounds.   Pulmonary:      Effort: Pulmonary effort is normal.      Breath sounds: Normal breath sounds.   Abdominal:      General: Bowel sounds are normal.      Palpations: Abdomen is soft.   Musculoskeletal:         General: Normal range of motion.      Cervical back: Normal range of motion and neck supple.   Skin:     General: Skin is warm and dry.   Neurological:      Mental Status: She is alert and oriented to person, place, and time.   Psychiatric:         Behavior: Behavior normal.         Thought Content: Thought content normal.         Judgment: Judgment normal.         Assessment/Plan   Diagnoses and all orders for this visit:    Pre-op exam (Primary)  -     ECG 12 LEAD OFFICE PERFORMED    EKG completed in office, reviewed by Dr Santos. Emily's class I intraoperative risk. Form completed and faxed to surgeons office.   All questions and concerns addressed.    TIFFANIE Hicks

## 2024-03-16 DIAGNOSIS — R94.31 ABNORMAL EKG: Primary | ICD-10-CM

## 2024-03-16 RX ORDER — CONJUGATED ESTROGENS/BAZEDOXIFENE .45; 2 MG/1; MG/1
TABLET, FILM COATED ORAL
COMMUNITY
Start: 2024-02-20 | End: 2024-06-14 | Stop reason: HOSPADM

## 2024-03-16 RX ORDER — VENLAFAXINE HYDROCHLORIDE 37.5 MG/1
37.5 CAPSULE, EXTENDED RELEASE ORAL DAILY
Qty: 90 CAPSULE | Refills: 0 | Status: SHIPPED | OUTPATIENT
Start: 2024-03-16 | End: 2024-05-21 | Stop reason: SDUPTHER

## 2024-04-18 ENCOUNTER — TRANSCRIBE ORDERS (OUTPATIENT)
Dept: SCHEDULING | Facility: REHABILITATION | Age: 59
End: 2024-04-18

## 2024-04-18 DIAGNOSIS — Z96.651 HISTORY OF TOTAL KNEE ARTHROPLASTY, RIGHT: Primary | ICD-10-CM

## 2024-04-24 ENCOUNTER — HOSPITAL ENCOUNTER (OUTPATIENT)
Dept: OCCUPATIONAL THERAPY | Facility: REHABILITATION | Age: 59
Setting detail: THERAPIES SERIES
Discharge: HOME | End: 2024-04-24
Attending: PHYSICIAN ASSISTANT
Payer: COMMERCIAL

## 2024-04-24 DIAGNOSIS — R26.9 GAIT ABNORMALITY: ICD-10-CM

## 2024-04-24 DIAGNOSIS — Z96.651 HISTORY OF TOTAL KNEE ARTHROPLASTY, RIGHT: Primary | ICD-10-CM

## 2024-04-24 PROCEDURE — 97530 THERAPEUTIC ACTIVITIES: CPT | Mod: GP

## 2024-04-24 PROCEDURE — 97161 PT EVAL LOW COMPLEX 20 MIN: CPT | Mod: GP

## 2024-04-24 PROCEDURE — 97140 MANUAL THERAPY 1/> REGIONS: CPT | Mod: GP

## 2024-04-24 NOTE — LETTER
Dear DR. Felton,    Thank you for this referral. Please review the attached notes and plan of care for your approval.  Please contact our department with any questions.     Sincerely,     Xochitl Rucker, PT  [address] [address]  Phone [phone]  Fax  [phone]    By co-signing this Plan of Care (POC) you agree to the following:  I have reviewed the the Plan of Care established by the therapist within this document and certify that the services are skilled and medically necessary. I have reviewed the plan and recommend that these services continue to meet the goals stated in this document.    PHYSICIAN SIGNATURE: __________________________________     DATE: ___________________  TIME: _____________           Physical Therapy Plan of Care 24   Effective from: 2024  Effective to: 2024    Plan ID: [id number]            Participants as of Finalize on 2024    Name Type Comments Contact Info    ANGELO Angulo Referring Provider  [phone]    Xochitl Rucker, PT Physical Therapist         Last Progress Notes Note     Author: Xochitl Rucker, PT Status: Signed Last edited: 2024 10:00 AM       Physical Therapy Evaluation    [facility] Fax: [phone]    PT EVALUATION FOR OUTPATIENT THERAPY    Patient: Renee Bustamante    MRN: [id number]  : 1965 58 y.o.     Referring Physician: Magnolia Felton PA C  Date of Visit: 2024      Certification Dates:  24 through 24  2-3x/week      Recommended Frequency & Duration:    for up to 3 months     Diagnosis:   1. History of total knee arthroplasty, right    2. Gait abnormality        Chief Complaints:   Chief Complaint   Patient presents with   • Pain   • Dec ROM   • Dec Strength   • Abnormality Of Gait   • Decreased Endurance   • Decreased recreational/play activity       Precautions: no known precautions/restrictions  Precautions additional comments:      Past Medical History: History reviewed.  No pertinent past medical history.    Past Surgical History: History reviewed. No pertinent surgical history.      LEARNING ASSESSMENT    Assessment completed:  Yes    Learner name:  Tiffanie Bustamante    Learner: Patient    Learning Barriers:  Learning barriers: No Barriers    Preferred Language: English     Needed: No    Education Provided:   Method: Discussion  Readiness: acceptance  Response: Verbalizes understanding      CO-LEARNER ASSESSMENT:    Completed: No      Welcome letter discussed: Yes Patient provided with Welcome Letter, which includes attendance policy. Provided education regarding cancellation and no-show policy. Education regarding the importance of participation and regular attendance to maximize goal attainment.       OBJECTIVE MEASUREMENTS/DATA:    Time In Session:  Start Time: 1000  Stop Time: 1100  Time Calculation (min): 60 min   Assessment and Plan - 04/24/24 1200          Assessment    Plan of Care reviewed and patient/family in agreement Yes     System Pathology/Pathophysiology Noted musculoskeletal;neuromuscular     Functional Limitations in Following Categories (PT Eval) self-care;home management;community/leisure     Rehab Potential/Prognosis good, to achieve stated therapy goals     Problem List decreased endurance;decreased flexibility;decreased ROM;decreased strength;edema;impaired balance;pain     Clinical Assessment Tiffanie Bustamante is a 58 year old patient who is s/p R TKA on 4/1/2024. Her general health is good and she presents to OPPT ambulating with a hurrycane with decreased gait speed and decreased heel strike and push off. She has mild edema in her R LE with her pain level 4/10 on average. Her ROM is 7-89 degrees in the R knee and her strength is decreased in the RLE. Her Womac score is 48%, indicating moderate disability due to R knee surgery currently. She will benefit from skilled OPPT to address her impairments and improve her balance, gait and overall  functional mobility.     Plan and Recommendations Begin with manual therapy and TE with emphasis on achieving full extension in R knee. Gait training with hurrycane.     Planned Services CPT 26799 Gait training;CPT 96497 Manual therapy;CPT 31167 Neuromuscular Reeducation;CPT 89204 Therapeutic activities;CPT 55066 Therapeutic exercises;CPT 87520 Electrical stimulation UNATTENDED;CPT 68178 Hot/Cold Packs therapy                    General Information - 04/24/24 1005          Session Details    Document Type initial evaluation     Mode of Treatment individual therapy        General Information    Onset of Illness/Injury or Date of Surgery 04/01/24     Referring Physician Magnolia TSE     History of present illness/functional impairment This 58 year old patient is s/p R TKA on 4/1/2024 with  at [facility]. She states she tore the meniscus in her R knee while attending barre class in April of 2023 which was confirmed by MRI in June. She had knee pain x 3 years prior to that and had tried cortisone shots ad synvisc injections. No post op complications were reported and she went home the next day. She now presents to OPPT at [facility].     Patient/Family/Caregiver Comments/Observations Patient states she has not been sleeping well, but otherwise doing ok. She arrived ambulating with a hurrycane.  Pain level 3/10 R knee.     Existing Precautions/Restrictions no known precautions/restrictions                    Pain/Vitals - 04/24/24 1005          Pain Assessment    Currently in pain Yes     Preferred Pain Scale number (Numeric Rating Pain Scale)     Pain Side/Orientation right     Pain: Body location Knee     Pain Rating (0-10): Pre Activity 3     Pain Rating (0-10): Post Activity 3     Pain Description constant;dull        Pre Activity Vital Signs    Pulse 78     /66     BP Location Left upper arm     BP Method Automatic     Patient Position Sitting        Pain Intervention    Intervention   Evaluation     Post Intervention Comments no change                    Falls/Food Screening - 04/24/24 1005          Initial Falls Assessment    One or more falls in the last year No        Food Insecurity    Within the past 12 months, you worried that your food would run out before you got the money to buy more. Never true     Within the past 12 months, the food you bought just didn't last and you didn't have money to get more. Never true                    PT - 04/24/24 1005          Physical Therapy    Physical Therapy [facility] PT        PT Plan    PT Duration 3 months     PT Custom Frequency and Duration 2-3x/week     PT Cert From 04/24/24     PT Cert To 07/23/24     Date PT POC was sent to provider 04/24/24     Signed PT Plan of Care received?  No                       Living Environment    Living Environment - 04/24/24 1005          Living Environment    People in Home child(michael), adult;spouse     Living Arrangements house     Living Environment Comment 2 story house with FF stairs with HR. ! KAREN from garage        Relationship/Environment    Name(s) of People in Home Pat  ()                   PLOF:    Prior Level of Function - 04/24/24 1005          OTHER    Previous level of function I ADL, I ambulation without AD, Worked part time in retail, enjoys going to gym, walking outdoors.                   Sensory Tests    Sensory Testing - 04/24/24 2017          Sensory Assessment    Sensory Assessment sensation intact, lower extremities   numbness at lateral aspect R knee                  Skin    Skin Assessment - 04/24/24 2017          LE Skin    Skin Condition Impaired     Scar/Incision mobile   healing well with no erythema or drainage    Quality dry     Edema --   mild       Girth Measurements    Right Knee Girth measurement Mid patella= 41 cm; 15 cm up= 46 cm; 15 cm down= 37 cm     Left Knee Girth measurement Mid patella 39 cm, 15 cm up= 46 cm; 15 cm down= 39 cm                   ROM     Range of Motion - 04/24/24 1000          RIGHT: Lower Extremity AROM Assessment    Right LE AROM normal WNL   R hip    Knee Flexion   89     Knee Extension   -7     Ankle Dorsiflexion   5 degrees        LEFT: Lower Extremity AROM Assessment    Left LE AROM normal WNL     Knee Flexion   14     Knee Extension   0     Ankle Dorsiflexion   10 degrees                   MMT    Manual Muscle Tests - 04/24/24 1000          RIGHT: Lower Extremity Manual Muscle Test Assessment    Hip Flexion gross movement (4/5) good     Hip Extension gross movement (4/5) good     Hip Abduction gross movement (4-/5) good minus     Hip Adduction gross movement (4/5) good     Hip External Rotation gross movement (4-/5) good minus     Knee Flexion strength (4-/5) good minus     Knee Extension strength (4-/5) good minus     Ankle Dorsiflexion gross movement (4+/5) good plus     Ankle Plantarflexion gross movement (4/5) good        LEFT: Lower Extremity Manual Muscle Test Assessment    Hip Flexion gross movement (5/5) normal     Hip Extension gross movement (4+/5) good plus     Hip Abduction gross movement (4+/5) good plus     Hip Adduction gross movement (5/5) normal     Hip External Rotation gross movement (4+/5) good plus     Knee Flexion strength (5/5) normal     Knee Extension strength (5/5) normal     Ankle Dorsiflexion gross movement (5/5) normal     Ankle Plantarflexion gross movement (5/5) normal                   Palpation    Palpation - 04/24/24 2017          Palpation    LE Palpation  Patient with tightness R ITB and hamstrings,quads; TTP at medial> lateral joint line.                   Gait and Mobility    Gait and Mobility - 04/24/24 2131          Gait Training    [place], Gait modified independence     Variable surfaces Flat surface     Assistive Device cane, straight     Distance in Feet 50 feet     Pattern step-through     Deviations/Abnormal Patterns right sided deviations;antalgic;gait speed decreased;step length  decreased;weight shifting decreased     Comment (Gait/Stairs) decreased heel strike and push off                   Outcome Measures    PT Outcome Measures - 04/24/24 1000          Objective Outcome Measures    10 Meter Walk Test 11.5 meters/sec     Gait Speed (m/sec) 0.87 m/sec   with [place]       Other Outcome Measures Used/Comments    Other outcome measure used: WOMAC score= 46/96= 48%; Sitting tolerance unlimited, standing tolerance 10 minutes; walking tolerance 10-15 miutes                      Goals          Patient Stated    •  <enter goal here> (pt-stated)       The patient 's goals are to return to walking outdoors and to the exercises classes at the gym.         Other    •  Mutually agreed upon pain goal       Mutually agreed upon pain goal: Decrease pain to 0-2/10 at worst R knee      •  PT RW STG and LTG       Short Term Goal Time Frame Achieved Comment   Pt will increase R knee PROM by >/= 15 degrees 4 weeks     Pt will increase R knee AROM by >/= 10 degrees for improved functional motion with stair negotiation 4 weeks       Pt will increase RLE MMT by 1/2 grade or more for improved functional strength with weight bearing activities 4 weeks       Pt will score 35% or better on the WOMAC for improved tolerance in daily functional ambulation and stair negotiation 4 weeks       Pt will be supervision with HEP 4 weeks     Pt will decrease pain of R knee to </ 3/10 at worst for improved daily tolerance with weight bearing activities 4 weeks          Long Term Goal Time Frame Achieved Comment   Pt will demonstrate R knee AROM to WNL all planes to allow improved motion with daily functional ambulation, stair negotiation, squatting 8 -12 weeks       Pt will demonstrate 5/5 MMT of RLE  to improve functional strength with daily weight bearing activities 8-12 weeks       Pt will score 20% or better on WOMAC for improved functional tolerance with ambulation and stair negotiation  8-12weeks       Pt will perform  "walking outdoors and return too gym classes without limitation  8-12weeks     Pt will be independent with HEP  8-12weeks     Patient will ambulate on all surfaces without AD with normalized  gait pattern and gait speed >/= 1.5 m/sec 8-12 weeks     Pt will demonstrate </=2/10 R knee pain to improve tolerance with weight bearing activities 8 -12weeks                      TREATMENT PLAN:    PT FLOWSHEET  Renee Valentine  \"Soumya\"    s/p R TKA  Performed PT Exercises Current Session Time    HOT PACK/COLD PACK  CPT 99495   TOTAL TIME FOR SESSION Not performed    Ice     Heat     THER EX  CPT 66728 TOTAL TIME FOR SESSION  2 Minutes    Updated HEP     MOBILIZING     Heel slides     Term knee ext     Hamstring stretch     Gastroc stretch     Modified Harshil stretch     STRENGTHENING    yes Quad set 5 sec hold 1 x 10    Ankle pumps     SLR with TaC     S/l abduction     clamshells     Short  arc quad     Bridging with bolster or ball with TAC     Seated LAQ     Standing:   HR/TR  Marching  Hip abduction  Hip extension  Hamstring curls  minisquats     Forward step up  Lateral step up     Sit to stand     Sidestepping     CARDIOVASCULAR      Recumbent bike     Treadmill     NEURO RE-ED  CPT 66257 TOTAL TIME FOR SESSION Not performed    Static balance     Dynamic balance     GAIT TRAINING  CPT  35914            MANUAL  CPT 07439 TOTAL TIME FOR SESSION 8-22 min   yes STM    Joint mobs Performed retrograde massage and gentle desensitization in delgado incisional area.  Gentle patellar mobs    THER ACT  CPT 22488 TOTAL TIME FOR SESSION 8-22 Minutes   yes Patient Education Discussed findings on evaluation including anatomy involved and POC moving forward.   Adjusted the height of hurry cane.  Patient educated in the importance of elevation of RLE above her heart ad to use ice 10 min at a time throughout the day. Also discussed sleeping positions for increased comfort.         ASSESSMENT:    This 58 y.o. year old female presents to " PT with above stated diagnosis. Physical Therapy evaluation reveals decreased endurance, decreased flexibility, decreased ROM, decreased strength, edema, impaired balance, pain resulting in self-care, home management, community/leisure limitations. Renee Bustamante will benefit from skilled PT services to address limitations, work towards rehab and patient goals and maximize PLOF of chosen ADLs.     Planned Services: The patient's treatment will include CPT 26415 Gait training, CPT 34118 Manual therapy, CPT 31939 Neuromuscular Reeducation, CPT 43605 Therapeutic activities, CPT 28631 Therapeutic exercises, CPT 10159 Electrical stimulation UNATTENDED, CPT 85115 Hot/Cold Packs therapy, .     Xochitl Rucker, PT                           Current Participants as of 4/24/2024    Name Type Comments Contact Info    ANGELO Angulo Referring Provider  [phone]    Signature pending    Xochitl Rucker, PT Physical Therapist      Signature pending

## 2024-04-24 NOTE — OP PT TREATMENT LOG
"PT FLOWSHEET  Renee Valentine  \"Soumya\"    s/p R TKA  Performed PT Exercises Current Session Time    HOT PACK/COLD PACK  CPT 17367   TOTAL TIME FOR SESSION Not performed    Ice     Heat     THER EX  CPT 68604 TOTAL TIME FOR SESSION  2 Minutes    Updated HEP     MOBILIZING     Heel slides     Term knee ext     Hamstring stretch     Gastroc stretch     Modified Harshil stretch     STRENGTHENING    yes Quad set 5 sec hold 1 x 10    Ankle pumps     SLR with TaC     S/l abduction     clamshells     Short  arc quad     Bridging with bolster or ball with TAC     Seated LAQ     Standing:   HR/TR  Marching  Hip abduction  Hip extension  Hamstring curls  minisquats     Forward step up  Lateral step up     Sit to stand     Sidestepping     CARDIOVASCULAR      Recumbent bike     Treadmill     NEURO RE-ED  CPT 46718 TOTAL TIME FOR SESSION Not performed    Static balance     Dynamic balance     GAIT TRAINING  CPT  45268            MANUAL  CPT 33656 TOTAL TIME FOR SESSION 8-22 min   yes STM    Joint mobs Performed retrograde massage and gentle desensitization in delgado incisional area.  Gentle patellar mobs    THER ACT  CPT 58074 TOTAL TIME FOR SESSION 8-22 Minutes   yes Patient Education Discussed findings on evaluation including anatomy involved and POC moving forward.   Adjusted the height of hurry cane.  Patient educated in the importance of elevation of RLE above her heart ad to use ice 10 min at a time throughout the day. Also discussed sleeping positions for increased comfort.       "

## 2024-04-24 NOTE — PATIENT INSTRUCTIONS
Request Type: New Evaluation    Type of Visit: Single Serve    Evaluation Date: 4/24/2024        POC/Schedule: PT Cert From: 04/24/24       PT Cert To: 07/23/24       PT Duration: 3 months    Does patient require authorization prior to treatments?: no     Designation: 1:1       PT Duration: 3 months  PT Custom Frequency and Duration: 2-3x/week    Scheduling Instructions: Appropriate to schedule with PTA/ACE    **NOTE:**  Please schedule within the therapists listed below with no more than 3 therapists total for the whole plan of care. Please schedule last visit with a PT or OT, not a PTA or ACE. If the lead therapist is not the evaluating therapist, please inform the new lead therapist of this change.     Lead therapist: Elizabeth Rucker    Comments: 2-3x/week    Subgroups: [facility]

## 2024-04-25 NOTE — PROGRESS NOTES
Physical Therapy Evaluation    [facility] Fax: [phone]    PT EVALUATION FOR OUTPATIENT THERAPY    Patient: Renee Bustamante    MRN: [id number]  : 1965 58 y.o.     Referring Physician: Magnolia Felton PA C  Date of Visit: 2024      Certification Dates:  24 through 24  2-3x/week      Recommended Frequency & Duration:    for up to 3 months     Diagnosis:   1. History of total knee arthroplasty, right    2. Gait abnormality        Chief Complaints:   Chief Complaint   Patient presents with    Pain    Dec ROM    Dec Strength    Abnormality Of Gait    Decreased Endurance    Decreased recreational/play activity       Precautions: no known precautions/restrictions  Precautions additional comments:      Past Medical History: History reviewed. No pertinent past medical history.    Past Surgical History: History reviewed. No pertinent surgical history.      LEARNING ASSESSMENT    Assessment completed:  Yes    Learner name:  Tiffanie Bustamante    Learner: Patient    Learning Barriers:  Learning barriers: No Barriers    Preferred Language: English     Needed: No    Education Provided:   Method: Discussion  Readiness: acceptance  Response: Verbalizes understanding      CO-LEARNER ASSESSMENT:    Completed: No      Welcome letter discussed: Yes Patient provided with Welcome Letter, which includes attendance policy. Provided education regarding cancellation and no-show policy. Education regarding the importance of participation and regular attendance to maximize goal attainment.       OBJECTIVE MEASUREMENTS/DATA:    Time In Session:  Start Time: 1000  Stop Time: 1100  Time Calculation (min): 60 min   Assessment and Plan - 24 1200          Assessment    Plan of Care reviewed and patient/family in agreement Yes     System Pathology/Pathophysiology Noted musculoskeletal;neuromuscular     Functional Limitations in Following Categories (PT Eval) self-care;home management;community/leisure      Rehab Potential/Prognosis good, to achieve stated therapy goals     Problem List decreased endurance;decreased flexibility;decreased ROM;decreased strength;edema;impaired balance;pain     Clinical Assessment Tiffanie Bustamante is a 58 year old patient who is s/p R TKA on 4/1/2024. Her general health is good and she presents to OPPT ambulating with a hurrycane with decreased gait speed and decreased heel strike and push off. She has mild edema in her R LE with her pain level 4/10 on average. Her ROM is 7-89 degrees in the R knee and her strength is decreased in the RLE. Her Womac score is 48%, indicating moderate disability due to R knee surgery currently. She will benefit from skilled OPPT to address her impairments and improve her balance, gait and overall functional mobility.     Plan and Recommendations Begin with manual therapy and TE with emphasis on achieving full extension in R knee. Gait training with hurrycane.     Planned Services CPT 13480 Gait training;CPT 27468 Manual therapy;CPT 97480 Neuromuscular Reeducation;CPT 65969 Therapeutic activities;CPT 10444 Therapeutic exercises;CPT 88047 Electrical stimulation UNATTENDED;CPT 79599 Hot/Cold Packs therapy                    General Information - 04/24/24 1005          Session Details    Document Type initial evaluation     Mode of Treatment individual therapy        General Information    Onset of Illness/Injury or Date of Surgery 04/01/24     Referring Physician Magnolia TSE     History of present illness/functional impairment This 58 year old patient is s/p R TKA on 4/1/2024 with  at [facility]. She states she tore the meniscus in her R knee while attending barre class in April of 2023 which was confirmed by MRI in June. She had knee pain x 3 years prior to that and had tried cortisone shots ad synvisc injections. No post op complications were reported and she went home the next day. She now presents to OPPT at [facility].      Patient/Family/Caregiver Comments/Observations Patient states she has not been sleeping well, but otherwise doing ok. She arrived ambulating with a hurrycane.  Pain level 3/10 R knee.     Existing Precautions/Restrictions no known precautions/restrictions                    Pain/Vitals - 04/24/24 1005          Pain Assessment    Currently in pain Yes     Preferred Pain Scale number (Numeric Rating Pain Scale)     Pain Side/Orientation right     Pain: Body location Knee     Pain Rating (0-10): Pre Activity 3     Pain Rating (0-10): Post Activity 3     Pain Description constant;dull        Pre Activity Vital Signs    Pulse 78     /66     BP Location Left upper arm     BP Method Automatic     Patient Position Sitting        Pain Intervention    Intervention  Evaluation     Post Intervention Comments no change                    Falls/Food Screening - 04/24/24 1005          Initial Falls Assessment    One or more falls in the last year No        Food Insecurity    Within the past 12 months, you worried that your food would run out before you got the money to buy more. Never true     Within the past 12 months, the food you bought just didn't last and you didn't have money to get more. Never true                    PT - 04/24/24 1005          Physical Therapy    Physical Therapy [facility] PT        PT Plan    PT Duration 3 months     PT Custom Frequency and Duration 2-3x/week     PT Cert From 04/24/24     PT Cert To 07/23/24     Date PT POC was sent to provider 04/24/24     Signed PT Plan of Care received?  No                       Living Environment    Living Environment - 04/24/24 1005          Living Environment    People in Home child(michael), adult;spouse     Living Arrangements house     Living Environment Comment 2 story house with FF stairs with HR. ! KAREN from garage        Relationship/Environment    Name(s) of People in Home Pat  ()                   PLOF:    Prior Level of Function -  04/24/24 1005          OTHER    Previous level of function I ADL, I ambulation without AD, Worked part time in retail, enjoys going to gym, walking outdoors.                   Sensory Tests    Sensory Testing - 04/24/24 2017          Sensory Assessment    Sensory Assessment sensation intact, lower extremities   numbness at lateral aspect R knee                  Skin    Skin Assessment - 04/24/24 2017          LE Skin    Skin Condition Impaired     Scar/Incision mobile   healing well with no erythema or drainage    Quality dry     Edema --   mild       Girth Measurements    Right Knee Girth measurement Mid patella= 41 cm; 15 cm up= 46 cm; 15 cm down= 37 cm     Left Knee Girth measurement Mid patella 39 cm, 15 cm up= 46 cm; 15 cm down= 39 cm                   ROM    Range of Motion - 04/24/24 1000          RIGHT: Lower Extremity AROM Assessment    Right LE AROM normal WNL   R hip    Knee Flexion   89     Knee Extension   -7     Ankle Dorsiflexion   5 degrees        LEFT: Lower Extremity AROM Assessment    Left LE AROM normal WNL     Knee Flexion   14     Knee Extension   0     Ankle Dorsiflexion   10 degrees                   MMT    Manual Muscle Tests - 04/24/24 1000          RIGHT: Lower Extremity Manual Muscle Test Assessment    Hip Flexion gross movement (4/5) good     Hip Extension gross movement (4/5) good     Hip Abduction gross movement (4-/5) good minus     Hip Adduction gross movement (4/5) good     Hip External Rotation gross movement (4-/5) good minus     Knee Flexion strength (4-/5) good minus     Knee Extension strength (4-/5) good minus     Ankle Dorsiflexion gross movement (4+/5) good plus     Ankle Plantarflexion gross movement (4/5) good        LEFT: Lower Extremity Manual Muscle Test Assessment    Hip Flexion gross movement (5/5) normal     Hip Extension gross movement (4+/5) good plus     Hip Abduction gross movement (4+/5) good plus     Hip Adduction gross movement (5/5) normal     Hip External  Rotation gross movement (4+/5) good plus     Knee Flexion strength (5/5) normal     Knee Extension strength (5/5) normal     Ankle Dorsiflexion gross movement (5/5) normal     Ankle Plantarflexion gross movement (5/5) normal                   Palpation    Palpation - 04/24/24 2017          Palpation    LE Palpation  Patient with tightness R ITB and hamstrings,quads; TTP at medial> lateral joint line.                   Gait and Mobility    Gait and Mobility - 04/24/24 2131          Gait Training    Dickey, Gait modified independence     Variable surfaces Flat surface     Assistive Device cane, straight     Distance in Feet 50 feet     Pattern step-through     Deviations/Abnormal Patterns right sided deviations;antalgic;gait speed decreased;step length decreased;weight shifting decreased     Comment (Gait/Stairs) decreased heel strike and push off                   Outcome Measures    PT Outcome Measures - 04/24/24 1000          Objective Outcome Measures    10 Meter Walk Test 11.5 meters/sec     Gait Speed (m/sec) 0.87 m/sec   with Hurrycane       Other Outcome Measures Used/Comments    Other outcome measure used: WOMAC score= 46/96= 48%; Sitting tolerance unlimited, standing tolerance 10 minutes; walking tolerance 10-15 miutes                      Goals          Patient Stated      <enter goal here> (pt-stated)       The patient 's goals are to return to walking outdoors and to the exercises classes at the gym.         Other      Mutually agreed upon pain goal       Mutually agreed upon pain goal: Decrease pain to 0-2/10 at worst R knee        PT RW STG and LTG       Short Term Goal Time Frame Achieved Comment   Pt will increase R knee PROM by >/= 15 degrees 4 weeks     Pt will increase R knee AROM by >/= 10 degrees for improved functional motion with stair negotiation 4 weeks       Pt will increase RLE MMT by 1/2 grade or more for improved functional strength with weight bearing activities 4 weeks       Pt  "will score 35% or better on the WOMAC for improved tolerance in daily functional ambulation and stair negotiation 4 weeks       Pt will be supervision with HEP 4 weeks     Pt will decrease pain of R knee to </ 3/10 at worst for improved daily tolerance with weight bearing activities 4 weeks          Long Term Goal Time Frame Achieved Comment   Pt will demonstrate R knee AROM to WNL all planes to allow improved motion with daily functional ambulation, stair negotiation, squatting 8 -12 weeks       Pt will demonstrate 5/5 MMT of RLE  to improve functional strength with daily weight bearing activities 8-12 weeks       Pt will score 20% or better on WOMAC for improved functional tolerance with ambulation and stair negotiation  8-12weeks       Pt will perform walking outdoors and return too gym classes without limitation  8-12weeks     Pt will be independent with HEP  8-12weeks     Patient will ambulate on all surfaces without AD with normalized  gait pattern and gait speed >/= 1.5 m/sec 8-12 weeks     Pt will demonstrate </=2/10 R knee pain to improve tolerance with weight bearing activities 8 -12weeks                      TREATMENT PLAN:    PT FLOWSHEET  Renee Valentine  \"Soumya\"    s/p R TKA  Performed PT Exercises Current Session Time    HOT PACK/COLD PACK  CPT 35268   TOTAL TIME FOR SESSION Not performed    Ice     Heat     THER EX  CPT 68988 TOTAL TIME FOR SESSION  2 Minutes    Updated HEP     MOBILIZING     Heel slides     Term knee ext     Hamstring stretch     Gastroc stretch     Modified Harshil stretch     STRENGTHENING    yes Quad set 5 sec hold 1 x 10    Ankle pumps     SLR with TaC     S/l abduction     clamshells     Short  arc quad     Bridging with bolster or ball with TAC     Seated LAQ     Standing:   HR/TR  Marching  Hip abduction  Hip extension  Hamstring curls  minisquats     Forward step up  Lateral step up     Sit to stand     Sidestepping     CARDIOVASCULAR      Recumbent bike     Treadmill     " NEURO RE-ED  CPT 12432 TOTAL TIME FOR SESSION Not performed    Static balance     Dynamic balance     GAIT TRAINING  CPT  15343            MANUAL  CPT 70904 TOTAL TIME FOR SESSION 8-22 min   yes STM    Joint mobs Performed retrograde massage and gentle desensitization in delgado incisional area.  Gentle patellar mobs    THER ACT  CPT 30015 TOTAL TIME FOR SESSION 8-22 Minutes   yes Patient Education Discussed findings on evaluation including anatomy involved and POC moving forward.   Adjusted the height of hurry cane.  Patient educated in the importance of elevation of RLE above her heart ad to use ice 10 min at a time throughout the day. Also discussed sleeping positions for increased comfort.         ASSESSMENT:    This 58 y.o. year old female presents to PT with above stated diagnosis. Physical Therapy evaluation reveals decreased endurance, decreased flexibility, decreased ROM, decreased strength, edema, impaired balance, pain resulting in self-care, home management, community/leisure limitations. Renee Bustamante will benefit from skilled PT services to address limitations, work towards rehab and patient goals and maximize PLOF of chosen ADLs.     Planned Services: The patient's treatment will include CPT 77550 Gait training, CPT 57281 Manual therapy, CPT 80141 Neuromuscular Reeducation, CPT 03432 Therapeutic activities, CPT 70856 Therapeutic exercises, CPT 70136 Electrical stimulation UNATTENDED, CPT 27352 Hot/Cold Packs therapy, .     Xochitl Rucker, PT

## 2024-04-29 ENCOUNTER — HOSPITAL ENCOUNTER (OUTPATIENT)
Dept: OCCUPATIONAL THERAPY | Facility: REHABILITATION | Age: 59
Setting detail: THERAPIES SERIES
Discharge: HOME | End: 2024-04-29
Attending: PHYSICIAN ASSISTANT
Payer: COMMERCIAL

## 2024-04-29 DIAGNOSIS — Z96.651 HISTORY OF TOTAL KNEE ARTHROPLASTY, RIGHT: Primary | ICD-10-CM

## 2024-04-29 PROCEDURE — 97140 MANUAL THERAPY 1/> REGIONS: CPT | Mod: GP

## 2024-04-29 PROCEDURE — 97110 THERAPEUTIC EXERCISES: CPT | Mod: GP

## 2024-04-29 NOTE — PROGRESS NOTES
Physical Therapy Visit    PT DAILY NOTE FOR OUTPATIENT THERAPY    Patient: Renee Bustamante MRN: [id number]  : 1965 58 y.o.  Referring Physician: Magnolia Felton PA C  Date of Visit: 2024    Certification Dates: 24 through 24    Diagnosis:   1. History of total knee arthroplasty, right        Chief Complaints:  knee pain, gait dysfunction, decreaaed ROM    Precautions:   Existing Precautions/Restrictions: no known precautions/restrictions      TODAY'S VISIT    Time In Session:  Start Time: 0700  Stop Time: 0800  Time Calculation (min): 60 min   History/Vitals/Pain/Encounter Info - 24 0707          Injury History/Precautions/Daily Required Info    Document Type initial evaluation     Primary Therapist Xochitl Woods     Chief Complaint/Reason for Visit  knee pain, gait dysfunction, decreaaed ROM     Onset of Illness/Injury or Date of Surgery 24     Referring Physician Magnolia TSE     Existing Precautions/Restrictions no known precautions/restrictions     History of present illness/functional impairment This 58 year old patient is s/p R TKA on 2024 with  at [facility]. She states she tore the meniscus in her R knee while attending barre class in 2023 which was confirmed by MRI in . She had knee pain x 3 years prior to that and had tried cortisone shots ad synvisc No post op complications were reported and she went home the next day. She now presents to OPPT at [facility].     Patient/Family/Caregiver Comments/Observations Pt reports still not sleeping well. Pt notes she had alot of pain post evalaution. Pt also notes she did not exercises since Thursday. She had a busy weekend including a wedding.     Patient reported fall since last visit No        Pain Assessment    Currently in pain Yes     Pain Side/Orientation left     Pain: Body location Knee     Pain Rating (0-10): Pre Activity 3     Pain Rating (0-10): Post Activity 2        Pain  "Intervention    Intervention  ther ex/ manual     Post Intervention Comments slight improve,emt                    Daily Treatment Assessment and Plan - 04/29/24 0707          Daily Treatment Assessment and Plan    Progress toward goals Progressing     Daily Outcome Summary Fair tolerance to stretching. Encouraged patient to really focus on the ROM of the knee. Added a few new exercises for strengthening.  Performed some gentle massage which felt great.  -4-116 achieved passively. Needs to work on terminal knee extension when walking. Increased awareness at end of treatment session.     Plan and Recommendations continue with strengthening for knee extension as well as ROM. Add strengthening as able. Main focus on mobility currently.                         OBJECTIVE DATA TAKEN TODAY:    None taken    Today's Treatment:    PT FLOWSHEET  Renee Valentine  \"Soumya\"    s/p R TKA  Performed PT Exercises Current Session Time    HOT PACK/COLD PACK  CPT 94886   TOTAL TIME FOR SESSION Not performed   y Ice 10 min post therapy non billed    Heat     THER EX  CPT 14747 TOTAL TIME FOR SESSION   25 Minutes    Updated HEP     MOBILIZING    y Heel slides 10  used stap to assist with quad set in full extension as well    Term knee ext    y Hamstring stretch 4x20-30 with strap and overpressure on quad   y Gastroc stretch on board 4x15s    Modified Harshil stretch     STRENGTHENING    y Quad set 5 sec hold 1 x 10    Ankle pumps    y SLR with TaC 10    S/l abduction     clamshells    y Short  arc quad 15    Bridging with bolster or ball with TAC     Seated LAQ     Standing:   HR/TR  Marching  Hip abduction  Hip extension  Hamstring curls  minisquats     Forward step up  Lateral step up     Sit to stand     Sidestepping     CARDIOVASCULAR     y Recumbent bike 10 min level 3  Nu Step    Treadmill     NEURO RE-ED  CPT 43611 TOTAL TIME FOR SESSION Not performed    Static balance     Dynamic balance     GAIT TRAINING  CPT  94970  5 min "   y Working on heel strike nad push off      MANUAL  CPT 90642 TOTAL TIME FOR SESSION 25 min   Yes      Y    y STM      Stretching PROM flexion and extension  Joint mobs Performed retrograde massage and gentle desensitization in delgado incisional area.    -4- 116    Gentle patellar mobs      THER ACT  CPT 56391 TOTAL TIME FOR SESSION 5 Minutes   y Reviewed HEP and added a couple of new exercises for home      Patient Education Discussed findings on evaluation including anatomy involved and POC moving forward.   Adjusted the height of hurry cane.  Patient educated in the importance of elevation of RLE above her heart ad to use ice 10 min at a time throughout the day. Also discussed sleeping positions for increased comfort.

## 2024-04-29 NOTE — OP PT TREATMENT LOG
"PT FLOWSHEET  Renee Valentine  \"Soumya\"    s/p R TKA  Performed PT Exercises Current Session Time    HOT PACK/COLD PACK  CPT 27961   TOTAL TIME FOR SESSION Not performed   y Ice 10 min post therapy    Heat     THER EX  CPT 46386 TOTAL TIME FOR SESSION   20 Minutes    Updated HEP     MOBILIZING    y Heel slides 10  used stap to assist with quad set in full extension as well    Term knee ext    y Hamstring stretch 4x20-30 with strap and overpressure on quad   y Gastroc stretch on board 4x15s    Modified Harshil stretch     STRENGTHENING    y Quad set 5 sec hold 1 x 10    Ankle pumps    y SLR with TaC 10    S/l abduction     clamshells    y Short  arc quad 15    Bridging with bolster or ball with TAC     Seated LAQ     Standing:   HR/TR  Marching  Hip abduction  Hip extension  Hamstring curls  minisquats     Forward step up  Lateral step up     Sit to stand     Sidestepping     CARDIOVASCULAR     y Recumbent bike 10 min level 3  Nu Step    Treadmill     NEURO RE-ED  CPT 63183 TOTAL TIME FOR SESSION Not performed    Static balance     Dynamic balance     GAIT TRAINING  CPT  18795            MANUAL  CPT 10212 TOTAL TIME FOR SESSION 8-22 min   Yes      Y    y STM      Stretching PROM flexion and extension  Joint mobs Performed retrograde massage and gentle desensitization in delgado incisional area.    -4- 116    Gentle patellar mobs      THER ACT  CPT 84650 TOTAL TIME FOR SESSION 8-22 Minutes   yes Patient Education Discussed findings on evaluation including anatomy involved and POC moving forward.   Adjusted the height of hurry cane.  Patient educated in the importance of elevation of RLE above her heart ad to use ice 10 min at a time throughout the day. Also discussed sleeping positions for increased comfort.       "

## 2024-04-29 NOTE — OP PT TREATMENT LOG
"PT FLOWSHEET  Renee Valentine  \"Soumya\"    s/p R TKA  Performed PT Exercises Current Session Time    HOT PACK/COLD PACK  CPT 02506   TOTAL TIME FOR SESSION Not performed   y Ice 10 min post therapy non billed    Heat     THER EX  CPT 60471 TOTAL TIME FOR SESSION   25 Minutes    Updated HEP     MOBILIZING    y Heel slides 10  used stap to assist with quad set in full extension as well    Term knee ext    y Hamstring stretch 4x20-30 with strap and overpressure on quad   y Gastroc stretch on board 4x15s    Modified Harshil stretch     STRENGTHENING    y Quad set 5 sec hold 1 x 10    Ankle pumps    y SLR with TaC 10    S/l abduction     clamshells    y Short  arc quad 15    Bridging with bolster or ball with TAC     Seated LAQ     Standing:   HR/TR  Marching  Hip abduction  Hip extension  Hamstring curls  minisquats     Forward step up  Lateral step up     Sit to stand     Sidestepping     CARDIOVASCULAR     y Recumbent bike 10 min level 3  Nu Step    Treadmill     NEURO RE-ED  CPT 47157 TOTAL TIME FOR SESSION Not performed    Static balance     Dynamic balance     GAIT TRAINING  CPT  68187  5 min   y Working on heel strike nad push off      MANUAL  CPT 84499 TOTAL TIME FOR SESSION 25 min   Yes      Y    y STM      Stretching PROM flexion and extension  Joint mobs Performed retrograde massage and gentle desensitization in delgado incisional area.    -4- 116    Gentle patellar mobs      THER ACT  CPT 59534 TOTAL TIME FOR SESSION 5 Minutes   y Reviewed HEP and added a couple of new exercises for home      Patient Education Discussed findings on evaluation including anatomy involved and POC moving forward.   Adjusted the height of hurry cane.  Patient educated in the importance of elevation of RLE above her heart ad to use ice 10 min at a time throughout the day. Also discussed sleeping positions for increased comfort.       "

## 2024-05-01 ENCOUNTER — HOSPITAL ENCOUNTER (OUTPATIENT)
Dept: OCCUPATIONAL THERAPY | Facility: REHABILITATION | Age: 59
Setting detail: THERAPIES SERIES
Discharge: HOME | End: 2024-05-01
Attending: PHYSICIAN ASSISTANT
Payer: COMMERCIAL

## 2024-05-01 DIAGNOSIS — R26.9 GAIT ABNORMALITY: ICD-10-CM

## 2024-05-01 DIAGNOSIS — Z96.651 HISTORY OF TOTAL KNEE ARTHROPLASTY, RIGHT: Primary | ICD-10-CM

## 2024-05-01 PROCEDURE — 97110 THERAPEUTIC EXERCISES: CPT | Mod: GP

## 2024-05-01 PROCEDURE — 97140 MANUAL THERAPY 1/> REGIONS: CPT | Mod: GP

## 2024-05-01 NOTE — OP PT TREATMENT LOG
"PT FLOWSHEET  Renee Valentine  \"Soumya\"    s/p R TKA  Performed PT Exercises Current Session Time    HOT PACK/COLD PACK  CPT 03564   TOTAL TIME FOR SESSION Not performed   y Ice 10 min post therapy non billed    Heat     THER EX  CPT 01101 TOTAL TIME FOR SESSION   40 Minutes    Updated HEP     MOBILIZING    y Heel slides 10  used stap to assist with quad set in full extension as well    Term knee ext    y Hamstring stretch 4x20-30 with strap and overpressure on quad   y Gastroc stretch on board 4x15s    Modified Harshil stretch     STRENGTHENING    y Quad set 5 sec hold 1 x 10    Ankle pumps    y SLR with TaC 2x10        add clamshells    y Short  arc quad 20   add Bridging with bolster or ball with TAC     Seated LAQ    y Prone hip extensions 10   y Prone knee flexion 10   y Hip abduction in sidelying 10     Add  Add  add Standing:   HR/TR  Marching  Hip abduction  Hip extension  Hamstring curls  minisquats    add Forward step up  Lateral step up     Sit to stand     Sidestepping    y Single leg stance with glut and quad isometrics 4x10s    CARDIOVASCULAR     y Recumbent bike 10 min level 3  Nu Step    Treadmill     NEURO RE-ED  CPT 28437 TOTAL TIME FOR SESSION Not performed   add Static balance     Dynamic balance     GAIT TRAINING  CPT  76728  5 min   y Working on heel strike and push off      MANUAL  CPT 08056 TOTAL TIME FOR SESSION 15 min   Yes      Y    y STM      Stretching PROM flexion and extension  Joint mobs Performed retrograde massage and gentle desensitization in delgado incisional area.    -2- 118    Gentle patellar mobs      THER ACT  CPT 36889 TOTAL TIME FOR SESSION 0 Minutes   y Reviewed HEP and added a couple of new exercises for home      Patient Education Discussed findings on evaluation including anatomy involved and POC moving forward.   Adjusted the height of hurry cane.  Patient educated in the importance of elevation of RLE above her heart ad to use ice 10 min at a time throughout the day. " Also discussed sleeping positions for increased comfort.

## 2024-05-01 NOTE — PROGRESS NOTES
Physical Therapy Visit    PT DAILY NOTE FOR OUTPATIENT THERAPY    Patient: Renee Bustamatne MRN: [id number]  : 1965 58 y.o.  Referring Physician: Magnolia Felton PA C  Date of Visit: 2024    Certification Dates: 24 through 24    Diagnosis:   1. History of total knee arthroplasty, right    2. Gait abnormality        Chief Complaints:  knee pain, gait dysfunction, decreaaed ROM    Precautions:   Existing Precautions/Restrictions: no known precautions/restrictions      TODAY'S VISIT    Time In Session:  Start Time: 0800  Stop Time: 0900  Time Calculation (min): 60 min   History/Vitals/Pain/Encounter Info - 24 0759          Injury History/Precautions/Daily Required Info    Document Type daily treatment     Primary Therapist Xochitl Woods     Chief Complaint/Reason for Visit  knee pain, gait dysfunction, decreaaed ROM     Onset of Illness/Injury or Date of Surgery 24     Referring Physician Magnolia TSE     Existing Precautions/Restrictions no known precautions/restrictions     History of present illness/functional impairment This 58 year old patient is s/p R TKA on 2024 with  at [facility]. She states she tore the meniscus in her R knee while attending barre class in 2023 which was confirmed by MRI in . She had knee pain x 3 years prior to that and had tried cortisone shots ad synvisc No post op complications were reported and she went home the next day. She now presents to OPPT at [facility].     Patient/Family/Caregiver Comments/Observations Pt reports she has been working hard on getting extension in the knee. Posterior knee feels really tight.     Patient reported fall since last visit No        Pain Assessment    Currently in pain Yes     Preferred Pain Scale number (Numeric Rating Pain Scale)     Pain Side/Orientation left     Pain: Body location Knee     Pain Rating (0-10): Pre Activity 3     Pain Rating (0-10): Post Activity 1         "Pain Intervention    Intervention  ther ex/ manaul     Post Intervention Comments reduced pain                    Daily Treatment Assessment and Plan - 05/01/24 0759          Daily Treatment Assessment and Plan    Progress toward goals Progressing     Daily Outcome Summary Pt is slowly improving the knee ROM. -2-118 passively. Improved active knee extension and increased terminal knee extension with gait. Added prone knee extension stretch as well as knee flexion in prone. Added Single leg stance fo risomteric strengthening for quads and gluts.  Pt was feeling light headed today. /64 noted. This was low for her. She eported she did take and oxycontin this morning and restarted her hormone replacement.     Plan and Recommendations continue with strengthenign and ROM. May try resisted walking                         OBJECTIVE DATA TAKEN TODAY:    None taken    Today's Treatment:    PT FLOWSHEET  Renee Valentine  \"Soumya\"    s/p R TKA  Performed PT Exercises Current Session Time    HOT PACK/COLD PACK  CPT 92721   TOTAL TIME FOR SESSION Not performed   y Ice 10 min post therapy non billed    Heat     THER EX  CPT 66154 TOTAL TIME FOR SESSION   40 Minutes    Updated HEP     MOBILIZING    y Heel slides 10  used stap to assist with quad set in full extension as well    Term knee ext    y Hamstring stretch 4x20-30 with strap and overpressure on quad   y Gastroc stretch on board 4x15s    Modified Harshil stretch     STRENGTHENING    y Quad set 5 sec hold 1 x 10    Ankle pumps    y SLR with TaC 2x10        add clamshells    y Short  arc quad 20   add Bridging with bolster or ball with TAC     Seated LAQ    y Prone hip extensions 10   y Prone knee flexion 10   y Hip abduction in sidelying 10     Add  Add  add Standing:   HR/TR  Marching  Hip abduction  Hip extension  Hamstring curls  minisquats    add Forward step up  Lateral step up     Sit to stand     Sidestepping    y Single leg stance with glut and quad isometrics " 4x10s    CARDIOVASCULAR     y Recumbent bike 10 min level 3  Nu Step    Treadmill     NEURO RE-ED  CPT 07393 TOTAL TIME FOR SESSION Not performed   add Static balance     Dynamic balance     GAIT TRAINING  CPT  06155  5 min   y Working on heel strike and push off      MANUAL  CPT 87929 TOTAL TIME FOR SESSION 15 min   Yes      Y    y STM      Stretching PROM flexion and extension  Joint mobs Performed retrograde massage and gentle desensitization in delgado incisional area.    -2- 118    Gentle patellar mobs      THER ACT  CPT 26859 TOTAL TIME FOR SESSION 0 Minutes   y Reviewed HEP and added a couple of new exercises for home      Patient Education Discussed findings on evaluation including anatomy involved and POC moving forward.   Adjusted the height of hurry cane.  Patient educated in the importance of elevation of RLE above her heart ad to use ice 10 min at a time throughout the day. Also discussed sleeping positions for increased comfort.

## 2024-05-06 ENCOUNTER — HOSPITAL ENCOUNTER (OUTPATIENT)
Dept: OCCUPATIONAL THERAPY | Facility: REHABILITATION | Age: 59
Setting detail: THERAPIES SERIES
Discharge: HOME | End: 2024-05-06
Attending: PHYSICIAN ASSISTANT
Payer: COMMERCIAL

## 2024-05-06 DIAGNOSIS — R26.9 GAIT ABNORMALITY: ICD-10-CM

## 2024-05-06 DIAGNOSIS — Z96.651 HISTORY OF TOTAL KNEE ARTHROPLASTY, RIGHT: Primary | ICD-10-CM

## 2024-05-06 PROCEDURE — 97140 MANUAL THERAPY 1/> REGIONS: CPT | Mod: GP

## 2024-05-06 PROCEDURE — 97110 THERAPEUTIC EXERCISES: CPT | Mod: GP

## 2024-05-06 NOTE — OP PT TREATMENT LOG
"PT FLOWSHEET  Renee Valentine  \"Soumya\"    s/p R TKA  Performed PT Exercises Current Session Time    HOT PACK/COLD PACK  CPT 48755   TOTAL TIME FOR SESSION Not performed   y Ice 10 min post therapy non billed    Heat     THER EX  CPT 82673 TOTAL TIME FOR SESSION   40 Minutes    Updated HEP     MOBILIZING    y Heel slides 10  used stap to assist with quad set in full extension as well    Term knee ext    y Hamstring stretch 4x20-30 with strap and overpressure on quad   y Gastroc stretch on board 4x15s    Modified Harshil stretch     STRENGTHENING    y Quad set 5 sec hold 1 x 10    Ankle pumps    y SLR with TaC 2x10        add clamshells    y Short  arc quad 20   add Bridging with bolster or ball with TAC     Seated LAQ     Prone hip extensions 10    Prone knee flexion 10    Hip abduction in sidelying 10     y  Add  add Standing:   HR/TR  Marching  Hip abduction  Hip extension  Hamstring curls  minisquats   Single leg heel raises x 10, b/l toe raises x20   add Forward step up  Lateral step up     Sit to stand     Sidestepping    y Single leg stance with glut and quad isometrics X30 sec    CARDIOVASCULAR     y Recumbent bike 10 min level 3  Nu Step    Treadmill     NEURO RE-ED  CPT 76237 TOTAL TIME FOR SESSION Not performed   add Static balance     Dynamic balance     GAIT TRAINING  CPT  94708  5 min   y Working on heel strike and push off-sized SPC correctly Able to walk without AD at home     stairs Ascend reciprocal with rail, descend step to with rail     MANUAL  CPT 95358 TOTAL TIME FOR SESSION 15 min   Yes      Y    y STM      Stretching PROM flexion and extension  Joint mobs Performed retrograde massage and gentle desensitization in delgado incisional area.    0 - 118    Gentle patellar mobs      THER ACT  CPT 27643 TOTAL TIME FOR SESSION 0-7  Minutes   y Reviewed HEP TID     y Patient Education Discussed findings on evaluation including anatomy involved and POC moving forward.   Adjusted the height of hurry " cane.  Patient educated in the importance of elevation of RLE above her heart ad to use ice 10 min at a time throughout the day. Also discussed sleeping positions for increased comfort. Scar massage education and demonstration 5/6/24

## 2024-05-06 NOTE — PROGRESS NOTES
"Physical Therapy Visit    PT DAILY NOTE FOR OUTPATIENT THERAPY    Patient: Renee Bustamante MRN: [id number]  : 1965 58 y.o.  Referring Physician: Magnolia Felton PA C  Date of Visit: 2024    Certification Dates: 24 through 24    Diagnosis:   1. History of total knee arthroplasty, right    2. Gait abnormality        Chief Complaints:  knee pain, gait dysfunction, decreaaed ROM    Precautions:   Existing Precautions/Restrictions: no known precautions/restrictions      TODAY'S VISIT    Time In Session:  Start Time: 0800  Stop Time: 0900  Time Calculation (min): 60 min   History/Vitals/Pain/Encounter Info - 24 0810          Injury History/Precautions/Daily Required Info    Document Type daily treatment     Primary Therapist Xochitl Woods     Chief Complaint/Reason for Visit  knee pain, gait dysfunction, decreaaed ROM     Onset of Illness/Injury or Date of Surgery 24     Referring Physician Magnolia TSE     Existing Precautions/Restrictions no known precautions/restrictions     History of present illness/functional impairment This 58 year old patient is s/p R TKA on 2024 with  at [facility]. She states she tore the meniscus in her R knee while attending barre class in 2023 which was confirmed by MRI in . She had knee pain x 3 years prior to that and had tried cortisone shots ad synvisc No post op complications were reported and she went home the next day. She now presents to OPPT at [facility].     Patient/Family/Caregiver Comments/Observations Hard weekend. Reports \"zaps\" of pain. Difficulty sleeping and a lot of discomfort. Did a lot of home exercises.     Patient reported fall since last visit No        Pain Assessment    Currently in pain Yes     Preferred Pain Scale number (Numeric Rating Pain Scale)     Pain Side/Orientation right     Pain: Body location Knee     Pain Rating (0-10): Pre Activity 2        Pain Intervention    Intervention  " "TE, MT, ice     Post Intervention Comments no change by the end of session        Activity Vital Signs    Activity Pulse 72     Activity SpO2 96 %     Activity /70     Activity BP Location Left upper arm     Activity BP Method Automatic     Patient Position Lying                    Daily Treatment Assessment and Plan - 05/06/24 0810          Daily Treatment Assessment and Plan    Progress toward goals Progressing     Daily Outcome Summary 5 weeks s/p R TKA. Arrived walking with SPC instead of quad cane, PT sized correctly.Able to ascend stairs using reciprocal gait pattern.Took oxy one hour prior to PT and had episode of near snycope with sweating, needing assist to lie down. VSS. Pt recovered with a few minutes rest in supine. Advised pt to discuss with MD d/c oxy for lesser pain med such as tylenol. Instructed in scar massage. Passive extension to zero today.     Plan and Recommendations HEP to be done TID. Continue to work on strength, end ROM, and gait on level surfaces and stairs without AD.                         OBJECTIVE DATA TAKEN TODAY:    None taken    Today's Treatment:    PT FLOWSHEET  Renee Valentine  \"Soumya\"    s/p R TKA  Performed PT Exercises Current Session Time    HOT PACK/COLD PACK  CPT 31328   TOTAL TIME FOR SESSION Not performed   y Ice 10 min post therapy non billed    Heat     THER EX  CPT 48807 TOTAL TIME FOR SESSION   40 Minutes    Updated HEP     MOBILIZING    y Heel slides 10  used stap to assist with quad set in full extension as well    Term knee ext    y Hamstring stretch 4x20-30 with strap and overpressure on quad   y Gastroc stretch on board 4x15s    Modified Harshil stretch     STRENGTHENING    y Quad set 5 sec hold 1 x 10    Ankle pumps    y SLR with TaC 2x10        add clamshells    y Short  arc quad 20   add Bridging with bolster or ball with TAC     Seated LAQ     Prone hip extensions 10    Prone knee flexion 10    Hip abduction in sidelying 10     y  Add  add " Standing:   HR/TR  Marching  Hip abduction  Hip extension  Hamstring curls  minisquats   Single leg heel raises x 10, b/l toe raises x20   add Forward step up  Lateral step up     Sit to stand     Sidestepping    y Single leg stance with glut and quad isometrics X30 sec    CARDIOVASCULAR     y Recumbent bike 10 min level 3  Nu Step    Treadmill     NEURO RE-ED  CPT 31397 TOTAL TIME FOR SESSION Not performed   add Static balance     Dynamic balance     GAIT TRAINING  CPT  73801  5 min   y Working on heel strike and push off-sized SPC correctly Able to walk without AD at home     stairs Ascend reciprocal with rail, descend step to with rail     MANUAL  CPT 31120 TOTAL TIME FOR SESSION 15 min   Yes      Y    y STM      Stretching PROM flexion and extension  Joint mobs Performed retrograde massage and gentle desensitization in delgado incisional area.    0 - 118    Gentle patellar mobs      THER ACT  CPT 27316 TOTAL TIME FOR SESSION 0-7  Minutes   y Reviewed HEP TID     y Patient Education Discussed findings on evaluation including anatomy involved and POC moving forward.   Adjusted the height of hurry cane.  Patient educated in the importance of elevation of RLE above her heart ad to use ice 10 min at a time throughout the day. Also discussed sleeping positions for increased comfort. Scar massage education and demonstration 5/6/24

## 2024-05-10 ENCOUNTER — HOSPITAL ENCOUNTER (OUTPATIENT)
Dept: OCCUPATIONAL THERAPY | Facility: REHABILITATION | Age: 59
Setting detail: THERAPIES SERIES
Discharge: HOME | End: 2024-05-10
Attending: PHYSICIAN ASSISTANT
Payer: COMMERCIAL

## 2024-05-10 DIAGNOSIS — Z96.651 HISTORY OF TOTAL KNEE ARTHROPLASTY, RIGHT: Primary | ICD-10-CM

## 2024-05-10 DIAGNOSIS — R26.9 GAIT ABNORMALITY: ICD-10-CM

## 2024-05-10 PROCEDURE — 97140 MANUAL THERAPY 1/> REGIONS: CPT | Mod: GP

## 2024-05-10 PROCEDURE — 97110 THERAPEUTIC EXERCISES: CPT | Mod: GP

## 2024-05-10 NOTE — OP PT TREATMENT LOG
"PT FLOWSHEET  Renee Nagy Cabe  \"Soumya\"    s/p R TKA  Performed PT Exercises Current Session Time    HOT PACK/COLD PACK  CPT 90458   TOTAL TIME FOR SESSION Not performed    Ice home    Heat     THER EX  CPT 46821 TOTAL TIME FOR SESSION   45 Minutes    Updated HEP     MOBILIZING    y Heel slides 10 with b/l legs on ball    Term knee ext    y Hamstring stretch 4x20-30 with strap and overpressure on quad   y Gastroc stretch on board 4x15s    Modified Harshil stretch     STRENGTHENING    y Quad set 5 sec hold 1 x 10    Ankle pumps    y SLR with quad set 2x10        add clamshells    y Short  arc quad 20   y Bridging with ball with TAC 10    Seated LAQ     Prone hip extensions 10    Prone knee flexion 10    Hip abduction in sidelying 10     y  Add  add Standing:   HR/TR  Marching  Hip abduction  Hip extension  Hamstring curls  minisquats   Single leg heel raises x 10, b/l toe raises x20   y Forward step up  Lateral step up  Step down 6 inch x 10 no hand support for fwd/side   y Sit to stand x10    Sidestepping    y Single leg stance with glut and quad isometrics X30 sec    CARDIOVASCULAR     y Recumbent bike  Nu Step x 10 min    Treadmill     NEURO RE-ED  CPT 80979 TOTAL TIME FOR SESSION Not performed    Static balance     Dynamic balance     GAIT TRAINING  CPT  17217  0 min    Working on heel strike and push off-sized SPC correctly Able to walk without AD at home     stairs Ascend reciprocal with rail, descend step to with rail     MANUAL  CPT 08011 TOTAL TIME FOR SESSION 15 min   Yes      Y    y STM      Stretching PROM flexion and extension  Joint mobs Performed retrograde massage and gentle desensitization in delgado incisional area.    0 - 120    Gentle patellar mobs      THER ACT  CPT 03983 TOTAL TIME FOR SESSION 0  Minutes    Reviewed HEP TID      Patient Education Discussed findings on evaluation including anatomy involved and POC moving forward.   Adjusted the height of hurry cane.  Patient educated in the " importance of elevation of RLE above her heart ad to use ice 10 min at a time throughout the day. Also discussed sleeping positions for increased comfort. Scar massage education and demonstration 5/6/24

## 2024-05-10 NOTE — PROGRESS NOTES
Physical Therapy Visit    PT DAILY NOTE FOR OUTPATIENT THERAPY    Patient: Renee Bustamante MRN: [id number]  : 1965 58 y.o.  Referring Physician: Magnolia Felton PA C  Date of Visit: 5/10/2024    Certification Dates: 24 through 24    Diagnosis:   1. History of total knee arthroplasty, right    2. Gait abnormality        Chief Complaints:  knee pain, gait dysfunction, decreaaed ROM    Precautions:   Existing Precautions/Restrictions: no known precautions/restrictions      TODAY'S VISIT    Time In Session:  Start Time: 1000  Stop Time: 1100  Time Calculation (min): 60 min   History/Vitals/Pain/Encounter Info - 05/10/24 1010          Injury History/Precautions/Daily Required Info    Document Type daily treatment     Primary Therapist Xochitl Woods     Chief Complaint/Reason for Visit  knee pain, gait dysfunction, decreaaed ROM     Onset of Illness/Injury or Date of Surgery 24     Referring Physician Magnolia TSE     Existing Precautions/Restrictions no known precautions/restrictions     History of present illness/functional impairment This 58 year old patient is s/p R TKA on 2024 with  at [facility]. She states she tore the meniscus in her R knee while attending barre class in 2023 which was confirmed by MRI in . She had knee pain x 3 years prior to that and had tried cortisone shots ad synvisc No post op complications were reported and she went home the next day. She now presents to OPPT at [facility].     Patient/Family/Caregiver Comments/Observations c/o difficulty sleeping. No longer taking oxy. Pain 4-5/10.     Patient reported fall since last visit No        Pain Assessment    Currently in pain Yes     Preferred Pain Scale number (Numeric Rating Pain Scale)     Pain Side/Orientation right     Pain: Body location Knee     Pain Rating (0-10): Pre Activity 5     Pain Rating (0-10): Activity 4     Pain Rating (0-10): Post Activity 4        Pain  "Intervention    Intervention  TE, MT     Post Intervention Comments no change from pre to post, increased pain with PROM during therapy                    Daily Treatment Assessment and Plan - 05/10/24 1010          Daily Treatment Assessment and Plan    Progress toward goals Progressing     Daily Outcome Summary Arrived walking with SPC. Added step ups/downs, sit to stands, bridges on ball. Gait is slow but symmetrical. Manual work did provide some relief in scar area as well as with knee pressure. PROM 0-120 today with signficant end range pain.     Plan and Recommendations Continue to progress program as per therapy log.                         OBJECTIVE DATA TAKEN TODAY:    None taken    Today's Treatment:    PT FLOWSHEET  Renee Valentine  \"Soumya\"    s/p R TKA  Performed PT Exercises Current Session Time    HOT PACK/COLD PACK  CPT 84090   TOTAL TIME FOR SESSION Not performed    Ice home    Heat     THER EX  CPT 23885 TOTAL TIME FOR SESSION   45 Minutes    Updated HEP     MOBILIZING    y Heel slides 10 with b/l legs on ball    Term knee ext    y Hamstring stretch 4x20-30 with strap and overpressure on quad   y Gastroc stretch on board 4x15s    Modified Harshil stretch     STRENGTHENING    y Quad set 5 sec hold 1 x 10    Ankle pumps    y SLR with quad set 2x10        add clamshells    y Short  arc quad 20   y Bridging with ball with TAC 10    Seated LAQ     Prone hip extensions 10    Prone knee flexion 10    Hip abduction in sidelying 10     y  Add  add Standing:   HR/TR  Marching  Hip abduction  Hip extension  Hamstring curls  minisquats   Single leg heel raises x 10, b/l toe raises x20   y Forward step up  Lateral step up  Step down 6 inch x 10 no hand support for fwd/side   y Sit to stand x10    Sidestepping    y Single leg stance with glut and quad isometrics X30 sec    CARDIOVASCULAR     y Recumbent bike  Nu Step x 10 min    Treadmill     NEURO RE-ED  CPT 18341 TOTAL TIME FOR SESSION Not performed    " Static balance     Dynamic balance     GAIT TRAINING  CPT  69792  0 min    Working on heel strike and push off-sized SPC correctly Able to walk without AD at home     stairs Ascend reciprocal with rail, descend step to with rail     MANUAL  CPT 95272 TOTAL TIME FOR SESSION 15 min   Yes      Y    y STM      Stretching PROM flexion and extension  Joint mobs Performed retrograde massage and gentle desensitization in delgado incisional area.    0 - 120    Gentle patellar mobs      THER ACT  CPT 22660 TOTAL TIME FOR SESSION 0  Minutes    Reviewed HEP TID      Patient Education Discussed findings on evaluation including anatomy involved and POC moving forward.   Adjusted the height of hurry cane.  Patient educated in the importance of elevation of RLE above her heart ad to use ice 10 min at a time throughout the day. Also discussed sleeping positions for increased comfort. Scar massage education and demonstration 5/6/24

## 2024-05-13 ENCOUNTER — HOSPITAL ENCOUNTER (OUTPATIENT)
Dept: OCCUPATIONAL THERAPY | Facility: REHABILITATION | Age: 59
Setting detail: THERAPIES SERIES
Discharge: HOME | End: 2024-05-13
Attending: PHYSICIAN ASSISTANT
Payer: COMMERCIAL

## 2024-05-13 DIAGNOSIS — Z96.651 HISTORY OF TOTAL KNEE ARTHROPLASTY, RIGHT: Primary | ICD-10-CM

## 2024-05-13 DIAGNOSIS — R26.9 GAIT ABNORMALITY: ICD-10-CM

## 2024-05-13 PROCEDURE — 97140 MANUAL THERAPY 1/> REGIONS: CPT | Mod: GP

## 2024-05-13 PROCEDURE — 97110 THERAPEUTIC EXERCISES: CPT | Mod: GP

## 2024-05-13 NOTE — OP PT TREATMENT LOG
"PT FLOWSHEET  Renee  Cabe  \"Soumya\"    s/p R TKA  Performed PT Exercises Current Session Time    HOT PACK/COLD PACK  CPT 41294   TOTAL TIME FOR SESSION Not performed    Ice home    Heat     THER EX  CPT 91295 TOTAL TIME FOR SESSION   50 Minutes    Updated HEP     MOBILIZING    y Heel slides 10 with b/l legs on ball    Term knee ext    y Hamstring stretch 4x20-30 with strap and overpressure on quad   y Gastroc stretch on board 3 x 30 sec   y Prone quad stretch 4 x 20 sec    Modified Harshil stretch     STRENGTHENING    y Quad set 5 sec hold 1 x 10    Ankle pumps    y SLR with quad set 2x10        add clamshells    y Short  arc quad 20   y Bridging with ball with TAC 10    Seated LAQ    y Prone hip extensions 10   y Prone knee flexion 10   y Hip abduction in sidelying 10     y  Add  add Standing:   HR/TR  Marching  Hip abduction  Hip extension  Hamstring curls  minisquats   Single leg heel raises x 10, b/l toe raises x20   Y  y  y Forward step up  Lateral step up  Step down 6 inch 2 x 10 no hand support for fwd/side  4 inch 1 x 10  4 inch 2 x 10 BUE support   y Sit to stand x10    Sidestepping    y Single leg stance with glut and quad isometrics X30 sec    CARDIOVASCULAR     y Recumbent bike  Nu Step x 10 min Seat 9 level 3    Treadmill     NEURO RE-ED  CPT 96194 TOTAL TIME FOR SESSION Not performed    Static balance     Dynamic balance     GAIT TRAINING  CPT  12134  0 min    Working on heel strike and push off-sized SPC correctly Able to walk without AD at home     stairs Ascend reciprocal with rail, descend step to with rail     MANUAL  CPT 59465 TOTAL TIME FOR SESSION 15 min   Yes      Y    y STM      Stretching PROM flexion and extension  Joint mobs Performed retrograde massage and gentle desensitization in delgado incisional area.    0 - 120    Gentle patellar mobs      THER ACT  CPT 43512 TOTAL TIME FOR SESSION 0  Minutes    Reviewed HEP TID      Patient Education Discussed findings on evaluation including " anatomy involved and POC moving forward.   Adjusted the height of hurry cane.  Patient educated in the importance of elevation of RLE above her heart ad to use ice 10 min at a time throughout the day. Also discussed sleeping positions for increased comfort. Scar massage education and demonstration 5/6/24

## 2024-05-14 NOTE — PROGRESS NOTES
Physical Therapy Visit    PT DAILY NOTE FOR OUTPATIENT THERAPY    Patient: Renee Bustamante MRN: [id number]  : 1965 58 y.o.  Referring Physician: Magnolia Felton PA C  Date of Visit: 2024    Certification Dates: 24 through 24    Diagnosis:   1. History of total knee arthroplasty, right    2. Gait abnormality        Chief Complaints:  knee pain, gait dysfunction, decreaaed ROM    Precautions:   Existing Precautions/Restrictions: no known precautions/restrictions      TODAY'S VISIT    Time In Session:  Start Time: 1100  Stop Time: 1200  Time Calculation (min): 60 min   History/Vitals/Pain/Encounter Info - 24 1101          Injury History/Precautions/Daily Required Info    Document Type daily treatment     Primary Therapist Xochitl Woods     Chief Complaint/Reason for Visit  knee pain, gait dysfunction, decreaaed ROM     Onset of Illness/Injury or Date of Surgery 24     Referring Physician Magnolia TSE     Existing Precautions/Restrictions no known precautions/restrictions     History of present illness/functional impairment This 58 year old patient is s/p R TKA on 2024 with  at [facility]. She states she tore the meniscus in her R knee while attending barre class in 2023 which was confirmed by MRI in . She had knee pain x 3 years prior to that and had tried cortisone shots ad synvisc No post op complications were reported and she went home the next day. She now presents to OPPT at [facility].     Patient/Family/Caregiver Comments/Observations Patient states she was able to sleep well last night and arrives with 2/10 pain R knee. She is ambulating with SPC.     Patient reported fall since last visit No        Pain Assessment    Currently in pain Yes     Preferred Pain Scale number (Numeric Rating Pain Scale)     Pain Side/Orientation right     Pain: Body location Knee     Pain Rating (0-10): Pre Activity 2     Pain Rating (0-10): Post Activity  "3        Pain Intervention    Intervention  TE, MT     Post Intervention Comments pain increased 1 grade                    Daily Treatment Assessment and Plan - 05/13/24 2135          Daily Treatment Assessment and Plan    Progress toward goals Progressing     Daily Outcome Summary Tiffanie is feeling much better today and began with Nustep x 10 min followed by manual treatment and TE as.on flow sheet with addition of prone quad stretch with strap and lateral step ups and forward step downs. She is walking without AD for household distances with good weight shift onto RLE and good heel-toe pattern. She uses SPC outdoors.     Plan and Recommendations Continue to progress program as tolerated.                         Today's Treatment:    PT FLOWSHEET  Renee Valentine  \"Soumya\"    s/p R TKA  Performed PT Exercises Current Session Time    HOT PACK/COLD PACK  CPT 32575   TOTAL TIME FOR SESSION Not performed    Ice home    Heat     THER EX  CPT 48425 TOTAL TIME FOR SESSION   50 Minutes    Updated HEP     MOBILIZING    y Heel slides 10 with b/l legs on ball    Term knee ext    y Hamstring stretch 4x20-30 with strap and overpressure on quad   y Gastroc stretch on board 3 x 30 sec   y Prone quad stretch 4 x 20 sec    Modified Harshil stretch     STRENGTHENING    y Quad set 5 sec hold 1 x 10    Ankle pumps    y SLR with quad set 2x10        add clamshells    y Short  arc quad 20   y Bridging with ball with TAC 10    Seated LAQ    y Prone hip extensions 10   y Prone knee flexion 10   y Hip abduction in sidelying 10     y  Add  add Standing:   HR/TR  Marching  Hip abduction  Hip extension  Hamstring curls  minisquats   Single leg heel raises x 10, b/l toe raises x20   Y  y  y Forward step up  Lateral step up  Step down 6 inch 2 x 10 no hand support for fwd/side  4 inch 1 x 10  4 inch 2 x 10 BUE support   y Sit to stand x10    Sidestepping    y Single leg stance with glut and quad isometrics X30 sec    CARDIOVASCULAR     y " Recumbent bike  Nu Step x 10 min Seat 9 level 3    Treadmill     NEURO RE-ED  CPT 82350 TOTAL TIME FOR SESSION Not performed    Static balance     Dynamic balance     GAIT TRAINING  CPT  49318  0 min    Working on heel strike and push off-sized SPC correctly Able to walk without AD at home     stairs Ascend reciprocal with rail, descend step to with rail     MANUAL  CPT 36031 TOTAL TIME FOR SESSION 15 min   Yes      Y    y STM      Stretching PROM flexion and extension  Joint mobs Performed retrograde massage and gentle desensitization in delgado incisional area.    0 - 120    Gentle patellar mobs      THER ACT  CPT 15485 TOTAL TIME FOR SESSION 0  Minutes    Reviewed HEP TID      Patient Education Discussed findings on evaluation including anatomy involved and POC moving forward.   Adjusted the height of hurry cane.  Patient educated in the importance of elevation of RLE above her heart ad to use ice 10 min at a time throughout the day. Also discussed sleeping positions for increased comfort. Scar massage education and demonstration 5/6/24

## 2024-05-17 ENCOUNTER — HOSPITAL ENCOUNTER (OUTPATIENT)
Dept: OCCUPATIONAL THERAPY | Facility: REHABILITATION | Age: 59
Setting detail: THERAPIES SERIES
Discharge: HOME | End: 2024-05-17
Attending: PHYSICIAN ASSISTANT
Payer: COMMERCIAL

## 2024-05-17 DIAGNOSIS — R26.9 GAIT ABNORMALITY: ICD-10-CM

## 2024-05-17 DIAGNOSIS — Z96.651 HISTORY OF TOTAL KNEE ARTHROPLASTY, RIGHT: Primary | ICD-10-CM

## 2024-05-17 PROCEDURE — 97110 THERAPEUTIC EXERCISES: CPT | Mod: GP

## 2024-05-17 PROCEDURE — 97140 MANUAL THERAPY 1/> REGIONS: CPT | Mod: GP

## 2024-05-17 NOTE — OP PT TREATMENT LOG
"PT FLOWSHEET  Renee Yakov Cabe  \"Soumya\"    s/p R TKA  Performed PT Exercises Current Session Time    HOT PACK/COLD PACK  CPT 28635   TOTAL TIME FOR SESSION Not performed    Ice home    Heat     THER EX  CPT 00831 TOTAL TIME FOR SESSION   45 Minutes    Updated HEP     MOBILIZING    y Heel slides 10 with b/l legs on ball and strap    Term knee ext    y Hamstring stretch 4x20-30 with strap and overpressure on quad   y Gastroc stretch on board 3 x 30 sec   y Prone quad stretch 4 x 20 sec    Modified Harshil stretch     STRENGTHENING    y Quad set 5 sec hold 1 x 10    Ankle pumps    y SLR with quad set 2x10        add clamshells    y Short  arc quad 20   y Bridging with ball with TAC 10    Seated LAQ     Prone hip extensions 10    Prone knee flexion 10    Hip abduction in sidelying 10     y Standing:   HR/TR     Single leg heel raises x 10, b/l toe raises x20   Y  y  y Forward step up  Lateral step up  Step down 6 inch 2 x 10 no hand support for fwd/side  6 inch 1 x 10  6 inch 2 x 10 BUE support   y Squats over low mat 2x10 cues to shift weight right    Sidestepping    y Single leg stance with glut and quad isometrics X30 sec    CARDIOVASCULAR     y Recumbent bike  Nu Step x 10 min Seat 9 level 3    Treadmill     NEURO RE-ED  CPT 97551 TOTAL TIME FOR SESSION Not performed    Static balance     Dynamic balance     GAIT TRAINING  CPT  01984  0 min    Working on heel strike and push off-sized SPC correctly Able to walk without AD at home     stairs Ascend reciprocal with rail, descend step to with rail     MANUAL  CPT 85365 TOTAL TIME FOR SESSION 15 min   Yes      Y    y STM      Stretching PROM flexion and extension  Joint mobs Performed retrograde massage and gentle desensitization in delgado incisional area.    0 - 118    Gentle patellar mobs      THER ACT  CPT 45734 TOTAL TIME FOR SESSION 0  Minutes    Reviewed HEP TID      Patient Education Discussed findings on evaluation including anatomy involved and POC moving " forward.   Adjusted the height of hurry cane.  Patient educated in the importance of elevation of RLE above her heart ad to use ice 10 min at a time throughout the day. Also discussed sleeping positions for increased comfort. Scar massage education and demonstration 5/6/24

## 2024-05-17 NOTE — PROGRESS NOTES
Physical Therapy Visit    PT DAILY NOTE FOR OUTPATIENT THERAPY    Patient: Renee Bustamante MRN: [id number]  : 1965 58 y.o.  Referring Physician: Magnolia Felton PA C  Date of Visit: 2024    Certification Dates: 24 through 24    Diagnosis:   1. History of total knee arthroplasty, right    2. Gait abnormality        Chief Complaints:  knee pain, gait dysfunction, decreaaed ROM    Precautions:   Existing Precautions/Restrictions: no known precautions/restrictions      TODAY'S VISIT    Time In Session:  Start Time: 0800  Stop Time: 0900  Time Calculation (min): 60 min   History/Vitals/Pain/Encounter Info - 24 0806          Injury History/Precautions/Daily Required Info    Document Type daily treatment     Primary Therapist Xochitl Woods     Chief Complaint/Reason for Visit  knee pain, gait dysfunction, decreaaed ROM     Onset of Illness/Injury or Date of Surgery 24     Referring Physician Magnolia TSE     Existing Precautions/Restrictions no known precautions/restrictions     History of present illness/functional impairment This 58 year old patient is s/p R TKA on 2024 with  at [facility]. She states she tore the meniscus in her R knee while attending barre class in 2023 which was confirmed by MRI in . She had knee pain x 3 years prior to that and had tried cortisone shots ad synvisc No post op complications were reported and she went home the next day. She now presents to OPPT at [facility].     Patient/Family/Caregiver Comments/Observations 2/10. Sleeping better. Not taking any meds for pain. Arrived walking with SPC, but does not use at home or in the clinic.     Patient reported fall since last visit No        Pain Assessment    Currently in pain Yes     Preferred Pain Scale number (Numeric Rating Pain Scale)     Pain Side/Orientation right     Pain: Body location Knee     Pain Rating (0-10): Pre Activity 2     Pain Rating (0-10): Activity  "2     Pain Rating (0-10): Post Activity 2        Pain Intervention    Intervention  TE, MT     Post Intervention Comments no change                    Daily Treatment Assessment and Plan - 05/17/24 0806          Daily Treatment Assessment and Plan    Progress toward goals Progressing     Daily Outcome Summary No SPC needed indoors. Advanced from sit to stands to squats with cues for weight shift onto right leg. Increased step height with side and reverse step ups. Scar is well healed with good mobility. AROM is 0-118.     Plan and Recommendations Continue to progress as tolerated.                         OBJECTIVE DATA TAKEN TODAY:    None taken    Today's Treatment:    PT FLOWSHEET  Renee Valentine  \"Soumya\"    s/p R TKA  Performed PT Exercises Current Session Time    HOT PACK/COLD PACK  CPT 61715   TOTAL TIME FOR SESSION Not performed    Ice home    Heat     THER EX  CPT 39876 TOTAL TIME FOR SESSION   45 Minutes    Updated HEP     MOBILIZING    y Heel slides 10 with b/l legs on ball and strap    Term knee ext    y Hamstring stretch 4x20-30 with strap and overpressure on quad   y Gastroc stretch on board 3 x 30 sec   y Prone quad stretch 4 x 20 sec    Modified Harshil stretch     STRENGTHENING    y Quad set 5 sec hold 1 x 10    Ankle pumps    y SLR with quad set 2x10        add clamshells    y Short  arc quad 20   y Bridging with ball with TAC 10    Seated LAQ     Prone hip extensions 10    Prone knee flexion 10    Hip abduction in sidelying 10     y Standing:   HR/TR     Single leg heel raises x 10, b/l toe raises x20   Y  y  y Forward step up  Lateral step up  Step down 6 inch 2 x 10 no hand support for fwd/side  6 inch 1 x 10  6 inch 2 x 10 BUE support   y Squats over low mat 2x10 cues to shift weight right    Sidestepping    y Single leg stance with glut and quad isometrics X30 sec    CARDIOVASCULAR     y Recumbent bike  Nu Step x 10 min Seat 9 level 3    Treadmill     NEURO RE-ED  CPT 50057 TOTAL TIME FOR " SESSION Not performed    Static balance     Dynamic balance     GAIT TRAINING  CPT  58901  0 min    Working on heel strike and push off-sized SPC correctly Able to walk without AD at home     stairs Ascend reciprocal with rail, descend step to with rail     MANUAL  CPT 82621 TOTAL TIME FOR SESSION 15 min   Yes      Y    y STM      Stretching PROM flexion and extension  Joint mobs Performed retrograde massage and gentle desensitization in delgado incisional area.    0 - 118    Gentle patellar mobs      THER ACT  CPT 90370 TOTAL TIME FOR SESSION 0  Minutes    Reviewed HEP TID      Patient Education Discussed findings on evaluation including anatomy involved and POC moving forward.   Adjusted the height of hurry cane.  Patient educated in the importance of elevation of RLE above her heart ad to use ice 10 min at a time throughout the day. Also discussed sleeping positions for increased comfort. Scar massage education and demonstration 5/6/24

## 2024-05-20 ENCOUNTER — HOSPITAL ENCOUNTER (OUTPATIENT)
Dept: OCCUPATIONAL THERAPY | Facility: REHABILITATION | Age: 59
Setting detail: THERAPIES SERIES
Discharge: HOME | End: 2024-05-20
Attending: PHYSICIAN ASSISTANT
Payer: COMMERCIAL

## 2024-05-20 DIAGNOSIS — R26.9 GAIT ABNORMALITY: ICD-10-CM

## 2024-05-20 DIAGNOSIS — Z96.651 HISTORY OF TOTAL KNEE ARTHROPLASTY, RIGHT: Primary | ICD-10-CM

## 2024-05-20 PROCEDURE — 97140 MANUAL THERAPY 1/> REGIONS: CPT | Mod: GP

## 2024-05-20 PROCEDURE — 97110 THERAPEUTIC EXERCISES: CPT | Mod: GP

## 2024-05-20 NOTE — PROGRESS NOTES
Physical Therapy Visit    PT DAILY NOTE FOR OUTPATIENT THERAPY    Patient: Renee Bustamante MRN: [id number]  : 1965 58 y.o.  Referring Physician: Magnolia Felton PA C  Date of Visit: 2024    Certification Dates: 24 through 24    Diagnosis:   1. History of total knee arthroplasty, right    2. Gait abnormality        Chief Complaints:  knee pain, gait dysfunction, decreaaed ROM    Precautions:   Existing Precautions/Restrictions: no known precautions/restrictions      TODAY'S VISIT    Time In Session:  Start Time: 0900  Stop Time: 1000  Time Calculation (min): 60 min   History/Vitals/Pain/Encounter Info - 24 0909          Injury History/Precautions/Daily Required Info    Document Type daily treatment     Primary Therapist Xochitl Woods     Chief Complaint/Reason for Visit  knee pain, gait dysfunction, decreaaed ROM     Onset of Illness/Injury or Date of Surgery 24     Referring Physician Magnolia TSE     Existing Precautions/Restrictions no known precautions/restrictions     History of present illness/functional impairment This 58 year old patient is s/p R TKA on 2024 with  at [facility]. She states she tore the meniscus in her R knee while attending barre class in 2023 which was confirmed by MRI in . She had knee pain x 3 years prior to that and had tried cortisone shots ad synvisc No post op complications were reported and she went home the next day. She now presents to OPPT at [facility].     Patient/Family/Caregiver Comments/Observations Pt reports she went to the gym yesterday and ride the recumbant bike 10 min. She felt more sore today as a result     Patient reported fall since last visit No        Pain Assessment    Currently in pain Yes     Preferred Pain Scale number (Numeric Rating Pain Scale)     Pain Side/Orientation right     Pain: Body location Knee     Pain Rating (0-10): Pre Activity 3     Pain Rating (0-10): Post Activity 2   "   Pain Onset/Duration having some lateral knee pain.        Pain Intervention    Intervention  TE/manual ice massage     Post Intervention Comments reduced pain                    Daily Treatment Assessment and Plan - 05/20/24 0909          Daily Treatment Assessment and Plan    Progress toward goals Progressing     Daily Outcome Summary ROM improving. 0-124 passively. Extension getting easy to get. Pt having some IT band irritation last few days. Added a stretch and ice massage at end of treatment. Pt reported this felt good. Asked patient to add soem IT band stretching and ice massage at home. Added a few new exercises. See flow sheet.  Still using cane with mild antalgic gait. Continue to work on strengthenign and gait quality     Plan and Recommendations monitor IT band                         OBJECTIVE DATA TAKEN TODAY:    None taken    Today's Treatment:    PT FLOWSHEET  Renee Valentine  \"Soumya\"    s/p R TKA  Performed PT Exercises Current Session Time    HOT PACK/COLD PACK  CPT 23897   TOTAL TIME FOR SESSION 0-7 minutes   y Ice Ice massage 2 min  non billed    Heat     THER EX  CPT 56165 TOTAL TIME FOR SESSION   50 Minutes    Updated HEP     MOBILIZING     Heel slides 10 with b/l legs on ball and strap    Term knee ext    y Hamstring stretch 4x20-30 with strap and overpressure on quad   y IT band stretch    y Gastroc stretch on board 3 x 30 sec    Prone quad stretch 4 x 20 sec    Modified Harshil stretch     STRENGTHENING          Ankle pumps    y SLR with quad set 2x10        add clamshells     Short  arc quad 20    Bridging with ball with TAC 10    Seated LAQ    y Prone hip extensions 10x2   y Prone knee flexion 10x2   y 90-90's 10   y Hip abduction in sidelying 10x2    Terminal knee extension with T band     Standing:   HR/TR     Single leg heel raises x 10, b/l toe raises x20   Y    y Forward step up  Lateral step up  Step down 6 inch 2 x 10 no hand support for fwd/side  6 inch 1 x 10  4 inch 1 x 10 " RUE support    Squats over low mat 2x10 cues to shift weight right    Sidestepping    y Single leg stance with glut and quad isometrics X30 sec   y L Hip abduction pushing ball into wall 10 5 sec hold. Used L UE support    CARDIOVASCULAR     y Recumbent bike  Nu Step x 10 min Seat 9 level 3    Treadmill     NEURO RE-ED  CPT 55016 TOTAL TIME FOR SESSION Not performed    Static balance     Dynamic balance     GAIT TRAINING  CPT  05638  0 min    Working on heel strike and push off-sized SPC correctly Able to walk without AD at home     stairs Ascend reciprocal with rail, descend step to with rail     MANUAL  CPT 50824 TOTAL TIME FOR SESSION 10 min   Yes      Y    y STM      Stretching PROM flexion and extension  Joint mobs IT band massage distally, scar massage    0 - 124    Gentle patellar mobs      THER ACT  CPT 00935 TOTAL TIME FOR SESSION 0  Minutes    Reviewed HEP TID      Patient Education Discussed findings on evaluation including anatomy involved and POC moving forward.   Adjusted the height of hurry cane.  Patient educated in the importance of elevation of RLE above her heart ad to use ice 10 min at a time throughout the day. Also discussed sleeping positions for increased comfort. Scar massage education and demonstration 5/6/24

## 2024-05-20 NOTE — OP PT TREATMENT LOG
"PT FLOWSHEET  Renee Yakov Cabe  \"Soumya\"    s/p R TKA  Performed PT Exercises Current Session Time    HOT PACK/COLD PACK  CPT 39710   TOTAL TIME FOR SESSION 0-7 minutes   y Ice Ice massage 2 min  non billed    Heat     THER EX  CPT 42536 TOTAL TIME FOR SESSION   50 Minutes    Updated HEP     MOBILIZING     Heel slides 10 with b/l legs on ball and strap    Term knee ext    y Hamstring stretch 4x20-30 with strap and overpressure on quad   y IT band stretch    y Gastroc stretch on board 3 x 30 sec    Prone quad stretch 4 x 20 sec    Modified Harshil stretch     STRENGTHENING          Ankle pumps    y SLR with quad set 2x10        add clamshells     Short  arc quad 20    Bridging with ball with TAC 10    Seated LAQ    y Prone hip extensions 10x2   y Prone knee flexion 10x2   y 90-90's 10   y Hip abduction in sidelying 10x2    Terminal knee extension with T band     Standing:   HR/TR     Single leg heel raises x 10, b/l toe raises x20   Y    y Forward step up  Lateral step up  Step down 6 inch 2 x 10 no hand support for fwd/side  6 inch 1 x 10  4 inch 1 x 10 RUE support    Squats over low mat 2x10 cues to shift weight right    Sidestepping    y Single leg stance with glut and quad isometrics X30 sec   y L Hip abduction pushing ball into wall 10 5 sec hold. Used L UE support    CARDIOVASCULAR     y Recumbent bike  Nu Step x 10 min Seat 9 level 3    Treadmill     NEURO RE-ED  CPT 98831 TOTAL TIME FOR SESSION Not performed    Static balance     Dynamic balance     GAIT TRAINING  CPT  40276  0 min    Working on heel strike and push off-sized SPC correctly Able to walk without AD at home     stairs Ascend reciprocal with rail, descend step to with rail     MANUAL  CPT 23370 TOTAL TIME FOR SESSION 10 min   Yes      Y    y STM      Stretching PROM flexion and extension  Joint mobs IT band massage distally, scar massage    0 - 124    Gentle patellar mobs      THER ACT  CPT 34197 TOTAL TIME FOR SESSION 0  Minutes    Reviewed HEP " TID      Patient Education Discussed findings on evaluation including anatomy involved and POC moving forward.   Adjusted the height of hurry cane.  Patient educated in the importance of elevation of RLE above her heart ad to use ice 10 min at a time throughout the day. Also discussed sleeping positions for increased comfort. Scar massage education and demonstration 5/6/24

## 2024-05-21 RX ORDER — VENLAFAXINE HYDROCHLORIDE 75 MG/1
75 CAPSULE, EXTENDED RELEASE ORAL DAILY
Qty: 90 CAPSULE | Refills: 0 | Status: SHIPPED | OUTPATIENT
Start: 2024-05-21 | End: 2024-06-14 | Stop reason: SDUPTHER

## 2024-05-22 ENCOUNTER — HOSPITAL ENCOUNTER (OUTPATIENT)
Dept: OCCUPATIONAL THERAPY | Facility: REHABILITATION | Age: 59
Setting detail: THERAPIES SERIES
Discharge: HOME | End: 2024-05-22
Attending: PHYSICIAN ASSISTANT
Payer: COMMERCIAL

## 2024-05-22 DIAGNOSIS — Z96.651 HISTORY OF TOTAL KNEE ARTHROPLASTY, RIGHT: Primary | ICD-10-CM

## 2024-05-22 PROCEDURE — 97140 MANUAL THERAPY 1/> REGIONS: CPT | Mod: GP,CQ

## 2024-05-22 PROCEDURE — 97110 THERAPEUTIC EXERCISES: CPT | Mod: GP,CQ

## 2024-05-22 NOTE — PROGRESS NOTES
Physical Therapy Visit    PT DAILY NOTE FOR OUTPATIENT THERAPY    Patient: Renee Bustamante MRN: [id number]  : 1965 58 y.o.  Referring Physician: Magnolia Felton PA C  Date of Visit: 2024    Certification Dates: 24 through 24    Diagnosis:   1. History of total knee arthroplasty, right        Chief Complaints:  knee pain, gait dysfunction, decreaaed ROM    Precautions:   Existing Precautions/Restrictions: no known precautions/restrictions      TODAY'S VISIT    Time In Session:  Start Time: 0800  Stop Time: 0900  Time Calculation (min): 60 min   History/Vitals/Pain/Encounter Info - 24 0807          Injury History/Precautions/Daily Required Info    Document Type daily treatment     Primary Therapist Xochitl Woods     Chief Complaint/Reason for Visit  knee pain, gait dysfunction, decreaaed ROM     Onset of Illness/Injury or Date of Surgery 24     Referring Physician Magnolia TSE     Existing Precautions/Restrictions no known precautions/restrictions     History of present illness/functional impairment This 58 year old patient is s/p R TKA on 2024 with  at [facility]. She states she tore the meniscus in her R knee while attending barre class in 2023 which was confirmed by MRI in . She had knee pain x 3 years prior to that and had tried cortisone shots ad synvisc No post op complications were reported and she went home the next day. She now presents to OPPT at [facility].     Patient/Family/Caregiver Comments/Observations Pt reports 2/10 pain today.Doctor appointment today.     Patient reported fall since last visit No        Pain Assessment    Currently in pain Yes     Preferred Pain Scale number (Numeric Rating Pain Scale)     Pain Side/Orientation right     Pain: Body location Knee     Pain Rating (0-10): Pre Activity 2     Pain Rating (0-10): Activity 2     Pain Rating (0-10): Post Activity 2        Pain Intervention    Intervention   "TE/massage/     Post Intervention Comments reduce pain                    Daily Treatment Assessment and Plan - 05/22/24 0807          Daily Treatment Assessment and Plan    Progress toward goals Progressing     Daily Outcome Summary Pt conts with TE's as per ex flow sheet. Pt's knee flexion 122 degrees-0 degrees  extension.Pt c/o medial knee pain and lateral knee clicking in hamstring area. Pt progressing her ex routine and working on good quad set while doing ex.     Plan and Recommendations Cont with therapy 2x/week,Pt has ortho appointment today                         OBJECTIVE DATA TAKEN TODAY:    None taken    Today's Treatment:    PT FLOWSHEET  Renee Valentine  \"Soumya\"    s/p R TKA  Performed PT Exercises Current Session Time    HOT PACK/COLD PACK  CPT 07507   TOTAL TIME FOR SESSION 0-7 minutes   y Ice Ice massage 2 min  non billed    Heat     THER EX  CPT 54383 TOTAL TIME FOR SESSION   50 Minutes    Updated HEP     MOBILIZING     Heel slides 10 with b/l legs on ball and strap    Term knee ext    y Hamstring stretch 4x20-30 with strap and overpressure on quad    IT band stretch    y Gastroc stretch on board 3 x 30 sec    Prone quad stretch 4 x 20 sec    Modified Harshil stretch     STRENGTHENING          Ankle pumps    y SLR with quad set 2x10        add clamshells    y Short  arc quad 20   y Bridging with ball with TAC 10   y Seated LAQ 10   y Prone hip extensions 10x2   y Prone knee flexion 10x2    90-90's 10   y Hip abduction in sidelying 10x2    Terminal knee extension with T band    y Standing:   HR/TR     Single leg heel raises x 10, b/l toe raises x20   Y    y Forward step up  Lateral step up  Step down 6 inch 2 x 10 no hand support for fwd/side  6 inch 1 x 10  4 inch 1 x 10 RUE support    Squats over low mat 2x10 cues to shift weight right    Sidestepping    y Single leg stance with glut and quad isometrics X30 sec   y L Hip abduction pushing ball into wall 10 5 sec hold. Used L UE support    " CARDIOVASCULAR     y Recumbent bike x 10 min Seat 9 level 3    Treadmill     NEURO RE-ED  CPT 63583 TOTAL TIME FOR SESSION Not performed    Static balance     Dynamic balance     GAIT TRAINING  CPT  86989  0 min    Working on heel strike and push off-sized SPC correctly Able to walk without AD at home     stairs Ascend reciprocal with rail, descend step to with rail     MANUAL  CPT 50898 TOTAL TIME FOR SESSION 10 min   Yes      Y    y STM      Stretching PROM flexion and extension  Joint mobs IT band massage distally, scar massage    0 - 122    Gentle patellar mobs      THER ACT  CPT 37649 TOTAL TIME FOR SESSION 0  Minutes    Reviewed HEP TID      Patient Education Discussed findings on evaluation including anatomy involved and POC moving forward.   Adjusted the height of hurry cane.  Patient educated in the importance of elevation of RLE above her heart ad to use ice 10 min at a time throughout the day. Also discussed sleeping positions for increased comfort. Scar massage education and demonstration 5/6/24

## 2024-05-22 NOTE — OP PT TREATMENT LOG
"PT FLOWSHEET  Renee Yakov Cabe  \"Soumya\"    s/p R TKA  Performed PT Exercises Current Session Time    HOT PACK/COLD PACK  CPT 25251   TOTAL TIME FOR SESSION 0-7 minutes   y Ice Ice massage 2 min  non billed    Heat     THER EX  CPT 97347 TOTAL TIME FOR SESSION   50 Minutes    Updated HEP     MOBILIZING     Heel slides 10 with b/l legs on ball and strap    Term knee ext    y Hamstring stretch 4x20-30 with strap and overpressure on quad    IT band stretch    y Gastroc stretch on board 3 x 30 sec    Prone quad stretch 4 x 20 sec    Modified Harshil stretch     STRENGTHENING          Ankle pumps    y SLR with quad set 2x10        add clamshells    y Short  arc quad 20   y Bridging with ball with TAC 10   y Seated LAQ 10   y Prone hip extensions 10x2   y Prone knee flexion 10x2    90-90's 10   y Hip abduction in sidelying 10x2    Terminal knee extension with T band    y Standing:   HR/TR     Single leg heel raises x 10, b/l toe raises x20   Y    y Forward step up  Lateral step up  Step down 6 inch 2 x 10 no hand support for fwd/side  6 inch 1 x 10  4 inch 1 x 10 RUE support    Squats over low mat 2x10 cues to shift weight right    Sidestepping    y Single leg stance with glut and quad isometrics X30 sec   y L Hip abduction pushing ball into wall 10 5 sec hold. Used L UE support    CARDIOVASCULAR     y Recumbent bike x 10 min Seat 9 level 3    Treadmill     NEURO RE-ED  CPT 37919 TOTAL TIME FOR SESSION Not performed    Static balance     Dynamic balance     GAIT TRAINING  CPT  86065  0 min    Working on heel strike and push off-sized SPC correctly Able to walk without AD at home     stairs Ascend reciprocal with rail, descend step to with rail     MANUAL  CPT 56389 TOTAL TIME FOR SESSION 10 min   Yes      Y    y STM      Stretching PROM flexion and extension  Joint mobs IT band massage distally, scar massage    0 - 122    Gentle patellar mobs      THER ACT  CPT 28043 TOTAL TIME FOR SESSION 0  Minutes    Reviewed HEP TID  "     Patient Education Discussed findings on evaluation including anatomy involved and POC moving forward.   Adjusted the height of hurry cane.  Patient educated in the importance of elevation of RLE above her heart ad to use ice 10 min at a time throughout the day. Also discussed sleeping positions for increased comfort. Scar massage education and demonstration 5/6/24

## 2024-05-29 ENCOUNTER — HOSPITAL ENCOUNTER (OUTPATIENT)
Dept: OCCUPATIONAL THERAPY | Facility: REHABILITATION | Age: 59
Setting detail: THERAPIES SERIES
Discharge: HOME | End: 2024-05-29
Attending: PHYSICIAN ASSISTANT
Payer: COMMERCIAL

## 2024-05-29 DIAGNOSIS — Z96.651 HISTORY OF TOTAL KNEE ARTHROPLASTY, RIGHT: Primary | ICD-10-CM

## 2024-05-29 DIAGNOSIS — R26.9 GAIT ABNORMALITY: ICD-10-CM

## 2024-05-29 PROCEDURE — 97530 THERAPEUTIC ACTIVITIES: CPT | Mod: GP

## 2024-05-29 PROCEDURE — 97110 THERAPEUTIC EXERCISES: CPT | Mod: GP

## 2024-05-29 PROCEDURE — 97140 MANUAL THERAPY 1/> REGIONS: CPT | Mod: GP

## 2024-05-29 NOTE — PROGRESS NOTES
Physical Therapy Progress Note    PT PROGRESS NOTE FOR OUTPATIENT THERAPY    Patient: Renee Bustamante   MRN: [id number]  : 1965 58 y.o.    Referring Physician: Magnolia Felton PA C  Date of Visit: 2024    Certification Dates: 24 through 24    Recommended Frequency & Duration:  3 times/week for up to 3 months   2-3x/week    Diagnosis:   1. History of total knee arthroplasty, right    2. Gait abnormality        Chief Complaints:  Chief Complaint   Patient presents with    Pain    Dec Strength    Abnormality Of Gait       Precautions:   Existing Precautions/Restrictions: no known precautions/restrictions    TODAY'S VISIT:    Time In Session:  Start Time: 0900  Stop Time: 1000  Time Calculation (min): 60 min   General Information - 24          Session Details    Document Type progress note     Mode of Treatment individual therapy        General Information    Onset of Illness/Injury or Date of Surgery 24     Referring Physician Magnolia TSE     History of present illness/functional impairment This 58 year old patient is s/p R TKA on 2024 with  at [facility]. She states she tore the meniscus in her R knee while attending barre class in 2023 which was confirmed by MRI in . She had knee pain x 3 years prior to that and had tried cortisone shots ad synvisc No post op complications were reported and she went home the next day. She now presents to OPPT at [facility].     Patient/Family/Caregiver Comments/Observations Tiffanie reports she had a good check up with her surgeon and that her pain level is 1/10 in the R knee on arrival. She has begun going to the gym to ride bike and do stretching exercises. She also reports L shoulder pain and difficulty buckling her bra behind her back.     Existing Precautions/Restrictions no known precautions/restrictions                    Daily Falls Screen - 24          Daily Falls Assessment     Patient reported fall since last visit No                    Pain/Vitals - 05/29/24 0902          Pain Assessment    Currently in pain Yes     Preferred Pain Scale number (Numeric Rating Pain Scale)     Pain Side/Orientation right     Pain: Body location Knee     Pain Rating (0-10): Pre Activity 1     Pain Rating (0-10): Post Activity 2        Pre Activity Vital Signs    Pulse 85     /63     BP Location Left upper arm     BP Method Automatic     Patient Position Sitting        Pain Intervention    Intervention  TE, MT     Post Intervention Comments pain increased 1 grade at end of the hour                    PT - 05/29/24 0902          Physical Therapy    Physical Therapy Special[facility] [employer] [facility] PT        PT Plan    Frequency of treatment 3 times/week     PT Duration 3 months     PT Custom Frequency and Duration 2-3x/week     PT Cert From 04/24/24     PT Cert To 07/23/24     Date PT POC was sent to provider 04/24/24     Signed PT Plan of Care received?  Yes                    Assessment and Plan - 05/29/24 0900          Assessment    Clinical Assessment Tiffanie Valentine is seen today for a 30 day progress note. She has made excellent gains with OPPT with her pain level 1/10 on arrival today and 3/10 at worst. Her R knee PROM is now 0-125 degrees and her strength has improved 1/2 grade in B LEs. Her WOMAC score has improved from 48% to 30%, indicating much improved functional tolerance and her gait speed has improved from 0.87 with SPC to 1.41 m/sec with no AD. Her SLS= 30 sec. She is ambulating with improved weight shift and heel strike with mild decreased push off. She ascends stairs reciprocally with HR and descends step to step with HR. She will benefit from ongoing OPPT to address her remaining deficits and improve her gait and overall functional tolerance and mobility.     Plan and Recommendations Cont with PT 2x/week and progress strengthening with tband TKE, step up repeaters and 4 way tband in  standing.                     OBJECTIVE MEASUREMENTS/DATA:    Gait and Mobility    Gait and Mobility - 05/29/24 1312          Gait Training    Avon, Gait modified independence     Variable surfaces Flat surface     Assistive Device none     Distance in Feet 100 feet     Pattern step-through     Comment Tiffanie ambulates without AD with improved weight shift onto RLE and good heel strike with improved gait speed. Occasional antalgia when fatigued and mild decreased push off.        Stairs Assessment    [place], Stairs modified independence     Ascending Stairs Technique step-over-step   with HR    Descending Stairs Technique step-to-step   with HR                    ROM and MMT          4/24/2024 5/29/2024   PT LE ROM Measurements   AROM: Right LE Gross Movement WNL       R hip    AROM: Left LE Gross Movement WNL    PROM: Right Knee Flexion  125   AROM: Right Knee Flexion 89 118   AROM: Left Knee Flexion 14 140   PROM: Right Knee Extension  0   AROM: Right Knee Extension -7 -4   AROM: Left Knee Extension 0 0   PROM: Right Ankle DF  12 degrees   AROM: Right Ankle DF 5 degrees 10 degrees   AROM: Left Ankle DF 10 degrees 10 degrees   PT Cervical/Lumbar/Other ROM Measurements   Right Knee Girth measurement Mid patella= 41 cm; 15 cm up= 46 cm; 15 cm down= 37 cm    Left Knee Girth measurement Mid patella 39 cm, 15 cm up= 46 cm; 15 cm down= 39 cm    PT LE MMT   Right Hip Flexion (4/5) good (4+/5) good plus   Left Hip Flexion (5/5) normal (5/5) normal   Right Hip Extension (4/5) good (4+/5) good plus   Left Hip Extension (4+/5) good plus (5/5) normal   Right Hip ABD (4-/5) good minus (4/5) good   Left Hip ABD (4+/5) good plus (5/5) normal   Right Hip ADD (4/5) good (4/5) good   Left Hip ADD (5/5) normal    Right Hip IR  (4/5) good   Right Hip ER (4-/5) good minus (4/5) good   Left Hip ER (4+/5) good plus (5/5) normal   Right Knee Flexion (4-/5) good minus (4/5) good   Left Knee Flexion (5/5) normal (5/5)  "normal   Right Knee Extension (4-/5) good minus (4/5) good   Left Knee Extension (5/5) normal (5/5) normal   Right Ankle DF (4+/5) good plus (4+/5) good plus   Left Ankle DF (5/5) normal (5/5) normal   Right Ankle PF (4/5) good (4/5) good   Left Ankle PF (5/5) normal (5/5) normal     Outcome Measures          4/24/2024    10:00 5/29/2024    09:00   PT OBJECTIVE Outcome Measures   10 Meter Walk Test 11.5 meters/sec 7.08 meters/sec   Gait Speed (m/sec) 0.87 m/sec       with Hurrycane 1.41 m/sec   PT SUBJECTIVE Outcome Measures   Other WOMAC score= 46/96= 48%; Sitting tolerance unlimited, standing tolerance 10 minutes; walking tolerance 10-15 miutes WOMAC= 29/96= 30%; Standing tolerance 10 min; walking tolerance= 15 min       Today's Treatment::    Education provided:  Yes: See treatment log for details of education provided    PT FLOWSHEET  Renee Valentine  \"Soumya\"    s/p R TKA  Performed PT Exercises Current Session Time    HOT PACK/COLD PACK  CPT 53402   TOTAL TIME FOR SESSION Not performed    Ice Ice massage 2 min  non billed    Heat     THER EX  CPT 38708 TOTAL TIME FOR SESSION   25 Minutes    Updated HEP     MOBILIZING    y Heel slides 10 with b/l legs on ball and strap    Term knee ext    y Hamstring stretch 4x20-30 with strap and overpressure on quad    IT band stretch    y Gastroc stretch on board 3 x 30 sec    Prone quad stretch 4 x 20 sec    Modified Harshil stretch     STRENGTHENING          Ankle pumps    y SLR with quad set 2x10   y clamshells 2 x 10   y Short  arc quad 20    Bridging with ball with TAC 10   y Seated LAQ 10   y Prone hip extensions 10x2   y Prone knee flexion 10x2    90-90's 10   y Hip abduction in sidelying 10x2    Terminal knee extension with T band     Standing:   HR/TR     Single leg heel raises x 10, b/l toe raises x20   Y    Y  y Forward step up repeaters with opp march  Lateral step up  Step down 6 inch 2 x 10 no hand support for fwd/side    6 inch 1 x 10  4 inch 2 x 10 RUE " support    Squats over low mat 2x10 cues to shift weight right    Sidestepping    y Single leg stance with glut and quad isometrics X30 sec    L Hip abduction pushing ball into wall 10 5 sec hold. Used L UE support    CARDIOVASCULAR     y Recumbent bike x 10 min Seat 9 level 3    Treadmill     NEURO RE-ED  CPT 72807 TOTAL TIME FOR SESSION Not performed    Static balance     Dynamic balance     GAIT TRAINING  CPT  60132  10 min   y Working on heel strike and pushoff Able to walk without AD at home and short distances     y stairs Ascend reciprocal with rail, descend step to with rail     MANUAL  CPT 82794 TOTAL TIME FOR SESSION 10 min   Yes      Y    y STM      Stretching PROM flexion and extension  Joint mobs IT band massage distally, scar massage    0 - 122    Gentle patellar mobs      THER ACT  CPT 68251 TOTAL TIME FOR SESSION 15 Minutes   y Assessment for progress note 5/29/2024- WOMAC, 10 MWT, MMT, ROM     Reviewed HEP TID                                     yes Patient Education Discussed findings on evaluation including anatomy involved and POC moving forward.   Adjusted the height of hurry cane.  Patient educated in the importance of elevation of RLE above her heart ad to use ice 10 min at a time throughout the day. Also discussed sleeping positions for increased comfort. Scar massage education and demonstration 5/6/24 5/29/2024- Discussed findings on assessment and progress towards goals, POC moving forward. Patient verbalized understanding and agreement.         Goals Addressed                   This Visit's Progress     PT RW STG and LTG        Short Term Goal Time Frame Achieved Comment   Pt will increase R knee PROM by >/= 15 degrees 4 weeks met    Pt will increase R knee AROM by >/= 10 degrees for improved functional motion with stair negotiation 4 weeks  met     Pt will increase RLE MMT by 1/2 grade or more for improved functional strength with weight bearing activities 4 weeks  met     Pt will score  35% or better on the WOMAC for improved tolerance in daily functional ambulation and stair negotiation 4 weeks  met  5/29= 30%   Pt will be supervision with HEP 4 weeks met    Pt will decrease pain of R knee to </ 3/10 at worst for improved daily tolerance with weight bearing activities 4 weeks met         Long Term Goal Time Frame Achieved Comment   Pt will demonstrate R knee AROM to WNL all planes to allow improved motion with daily functional ambulation, stair negotiation, squatting 8 -12 weeks  ongoing for all     Pt will demonstrate 5/5 MMT of RLE  to improve functional strength with daily weight bearing activities 8-12 weeks       Pt will score 20% or better on WOMAC for improved functional tolerance with ambulation and stair negotiation  8-12weeks       Pt will perform walking outdoors and return too gym classes without limitation  8-12weeks     Pt will be independent with HEP  8-12weeks     Patient will ambulate on all surfaces without AD with normalized  gait pattern and gait speed >/= 1.5 m/sec 8-12 weeks     Pt will demonstrate </=2/10 R knee pain to improve tolerance with weight bearing activities 8 -12weeks                    Xochitl Rucker, PT

## 2024-05-29 NOTE — OP PT TREATMENT LOG
"PT FLOWSHEET  Renee  Cabe  \"Soumya\"    s/p R TKA  Performed PT Exercises Current Session Time    HOT PACK/COLD PACK  CPT 44629   TOTAL TIME FOR SESSION Not performed    Ice Ice massage 2 min  non billed    Heat     THER EX  CPT 21174 TOTAL TIME FOR SESSION   25 Minutes    Updated HEP     MOBILIZING    y Heel slides 10 with b/l legs on ball and strap    Term knee ext    y Hamstring stretch 4x20-30 with strap and overpressure on quad    IT band stretch    y Gastroc stretch on board 3 x 30 sec    Prone quad stretch 4 x 20 sec    Modified Harshil stretch     STRENGTHENING          Ankle pumps    y SLR with quad set 2x10   y clamshells 2 x 10   y Short  arc quad 20    Bridging with ball with TAC 10   y Seated LAQ 10   y Prone hip extensions 10x2   y Prone knee flexion 10x2    90-90's 10   y Hip abduction in sidelying 10x2    Terminal knee extension with T band     Standing:   HR/TR     Single leg heel raises x 10, b/l toe raises x20   Y    Y  y Forward step up repeaters with opp march  Lateral step up  Step down 6 inch 2 x 10 no hand support for fwd/side    6 inch 1 x 10  4 inch 2 x 10 RUE support    Squats over low mat 2x10 cues to shift weight right    Sidestepping    y Single leg stance with glut and quad isometrics X30 sec    L Hip abduction pushing ball into wall 10 5 sec hold. Used L UE support    CARDIOVASCULAR     y Recumbent bike x 10 min Seat 9 level 3    Treadmill     NEURO RE-ED  CPT 21062 TOTAL TIME FOR SESSION Not performed    Static balance     Dynamic balance     GAIT TRAINING  CPT  67779  10 min   y Working on heel strike and pushoff Able to walk without AD at home and short distances     y stairs Ascend reciprocal with rail, descend step to with rail     MANUAL  CPT 81702 TOTAL TIME FOR SESSION 10 min   Yes      Y    y STM      Stretching PROM flexion and extension  Joint mobs IT band massage distally, scar massage    0 - 122    Gentle patellar mobs      THER ACT  CPT 71063 TOTAL TIME FOR SESSION " 15 Minutes   y Assessment for progress note 5/29/2024- WOMAC, 10 MWT, MMT, ROM     Reviewed HEP TID                                     yes Patient Education Discussed findings on evaluation including anatomy involved and POC moving forward.   Adjusted the height of hurry cane.  Patient educated in the importance of elevation of RLE above her heart ad to use ice 10 min at a time throughout the day. Also discussed sleeping positions for increased comfort. Scar massage education and demonstration 5/6/24 5/29/2024- Discussed findings on assessment and progress towards goals, POC moving forward. Patient verbalized understanding and agreement.

## 2024-06-03 ENCOUNTER — HOSPITAL ENCOUNTER (OUTPATIENT)
Dept: OCCUPATIONAL THERAPY | Facility: REHABILITATION | Age: 59
Setting detail: THERAPIES SERIES
Discharge: HOME | End: 2024-06-03
Attending: PHYSICIAN ASSISTANT
Payer: COMMERCIAL

## 2024-06-03 DIAGNOSIS — R26.9 GAIT ABNORMALITY: ICD-10-CM

## 2024-06-03 DIAGNOSIS — Z96.651 HISTORY OF TOTAL KNEE ARTHROPLASTY, RIGHT: Primary | ICD-10-CM

## 2024-06-03 PROCEDURE — 97110 THERAPEUTIC EXERCISES: CPT | Mod: GP

## 2024-06-03 PROCEDURE — 97140 MANUAL THERAPY 1/> REGIONS: CPT | Mod: GP

## 2024-06-03 NOTE — PROGRESS NOTES
Physical Therapy Visit    PT DAILY NOTE FOR OUTPATIENT THERAPY    Patient: Renee Bustamante MRN: [id number]  : 1965 58 y.o.  Referring Physician: Magnolia Felton PA C  Date of Visit: 6/3/2024    Certification Dates: 24 through 24    Diagnosis:   1. History of total knee arthroplasty, right    2. Gait abnormality        Chief Complaints:  knee pain, gait dysfunction, decreaaed ROM    Precautions:   Existing Precautions/Restrictions: no known precautions/restrictions      TODAY'S VISIT    Time In Session:  Start Time: 0900  Stop Time: 1000  Time Calculation (min): 60 min   History/Vitals/Pain/Encounter Info - 24 0912          Injury History/Precautions/Daily Required Info    Document Type progress note     Primary Therapist Xochitl Woods     Chief Complaint/Reason for Visit  knee pain, gait dysfunction, decreaaed ROM     Onset of Illness/Injury or Date of Surgery 24     Referring Physician Magnolia TSE     Existing Precautions/Restrictions no known precautions/restrictions     History of present illness/functional impairment This 58 year old patient is s/p R TKA on 2024 with  at [facility]. She states she tore the meniscus in her R knee while attending barre class in 2023 which was confirmed by MRI in . She had knee pain x 3 years prior to that and had tried cortisone shots ad synvisc No post op complications were reported and she went home the next day. She now presents to OPPT at [facility].     Patient/Family/Caregiver Comments/Observations Pt arrived on time for therapy. She had nott done much exercises due to crazy weekend schedule     Patient reported fall since last visit No        Pain Assessment    Currently in pain Yes     Preferred Pain Scale number (Numeric Rating Pain Scale)     Pain Side/Orientation right     Pain: Body location Knee     Pain Rating (0-10): Pre Activity 1   discomfort/stiffness    Pain Rating (0-10): Post Activity 0   "      Pain Intervention    Intervention  TE/ MT     Post Intervention Comments reduced pain                    Daily Treatment Assessment and Plan - 06/03/24 0912          Daily Treatment Assessment and Plan    Progress toward goals Progressing     Daily Outcome Summary Pt came in feeling a little more stiff today. She felt better post therapy. Mild swelling noted in R knee. Pt did alot of activity. Performed exercises as per flow sheet. Added a few new exercises. Eccentric control was challenging. Pt was getting hamstring spasms with bridges. Cues to use gluteals more.     Plan and Recommendations Continue to progress exercises. Add more closed chain exercises, see flow sheet                         OBJECTIVE DATA TAKEN TODAY:    None taken    Today's Treatment:    PT FLOWSHEET  Renee Valentine  \"Soumya\"    s/p R TKA  Performed PT Exercises Current Session Time    HOT PACK/COLD PACK  CPT 39115   TOTAL TIME FOR SESSION Not performed    Ice Ice massage 2 min  non billed    Heat     THER EX  CPT 71629 TOTAL TIME FOR SESSION   50 Minutes    Updated HEP     MOBILIZING     Heel slides 10 with b/l legs on ball and strap    Term knee ext    y Hamstring stretch 4x20-30 with strap and overpressure on quad    IT band stretch     Gastroc stretch on board 3 x 30 sec   y Prone quad stretch 4 x 20 sec    Modified Harshil stretch     STRENGTHENING          Ankle pumps    y SLR with quad set on elbows 2x10   y clamshells 2 x 10 orange band    Short  arc quad 20   y Single leg bridging 10  cues to butt squeeze    Seated LAQ 10 manual resistence conn   y Prone hip extensions 10x2   y Prone knee flexion 10x with manual resistance con/ecc   y 90-90's 10x2 orange band   y Hip abduction in sidelying 10x2 orange band   y Terminal knee extension with T band 15 green band   y Hip abduction at wall  Press L leg into ball standing on R x10    Standing:   HR/TR     Single leg heel raises x 10, b/l toe raises x20   Y    Y  y Forward step up " repeaters with opp march  Lateral step up  Step down 8 inch 2 x 10  regular step ups then repeaters with 8 inch to 12 inch on side    6 inch 1 x 10  4 inch 2 x 10 RUE support    Squats over low mat 2x10 cues to shift weight right   add Sidestepping    add Single leg stance with taps in front and back (slight stance knee flexion)    add Backwards walking     Single leg stance with glut and quad isometrics X30 sec    L Hip abduction pushing ball into wall 10 5 sec hold. Used L UE support    CARDIOVASCULAR     y Recumbent bike x 10 min Seat 9 level 3    Treadmill     NEURO RE-ED  CPT 32806 TOTAL TIME FOR SESSION Not performed    Static balance     Dynamic balance     GAIT TRAINING  CPT  29410  0 min    Working on heel strike and pushoff       stairs Ascend reciprocal with rail, descend step to with rail     MANUAL  CPT 16943 TOTAL TIME FOR SESSION 10 min   Yes      Y    y STM      Stretching PROM flexion and extension  Joint mobs IT band massage distally, scar massage    0 - 122    Gentle patellar mobs      THER ACT  CPT 65355 TOTAL TIME FOR SESSION 0 Minutes    Assessment for progress note 5/29/2024- WOMAC, 10 MWT, MMT, ROM     Reviewed HEP TID                                    Patient Education Discussed findings on evaluation including anatomy involved and POC moving forward.   Adjusted the height of hurry cane.  Patient educated in the importance of elevation of RLE above her heart ad to use ice 10 min at a time throughout the day. Also discussed sleeping positions for increased comfort. Scar massage education and demonstration 5/6/24 5/29/2024- Discussed findings on assessment and progress towards goals, POC moving forward. Patient verbalized understanding and agreement.

## 2024-06-03 NOTE — OP PT TREATMENT LOG
"PT FLOWSHEET  Renee Yakov Cabe  \"Soumya\"    s/p R TKA  Performed PT Exercises Current Session Time    HOT PACK/COLD PACK  CPT 07958   TOTAL TIME FOR SESSION Not performed    Ice Ice massage 2 min  non billed    Heat     THER EX  CPT 35567 TOTAL TIME FOR SESSION   50 Minutes    Updated HEP     MOBILIZING     Heel slides 10 with b/l legs on ball and strap    Term knee ext    y Hamstring stretch 4x20-30 with strap and overpressure on quad    IT band stretch     Gastroc stretch on board 3 x 30 sec   y Prone quad stretch 4 x 20 sec    Modified Harshil stretch     STRENGTHENING          Ankle pumps    y SLR with quad set on elbows 2x10   y clamshells 2 x 10 orange band    Short  arc quad 20   y Single leg bridging 10  cues to butt squeeze    Seated LAQ 10 manual resistence conn   y Prone hip extensions 10x2   y Prone knee flexion 10x with manual resistance con/ecc   y 90-90's 10x2 orange band   y Hip abduction in sidelying 10x2 orange band   y Terminal knee extension with T band 15 green band   y Hip abduction at wall  Press L leg into ball standing on R x10    Standing:   HR/TR     Single leg heel raises x 10, b/l toe raises x20   Y    Y  y Forward step up repeaters with opp march  Lateral step up  Step down 8 inch 2 x 10  regular step ups then repeaters with 8 inch to 12 inch on side    6 inch 1 x 10  4 inch 2 x 10 RUE support    Squats over low mat 2x10 cues to shift weight right   add Sidestepping    add Single leg stance with taps in front and back (slight stance knee flexion)    add Backwards walking     Single leg stance with glut and quad isometrics X30 sec    L Hip abduction pushing ball into wall 10 5 sec hold. Used L UE support    CARDIOVASCULAR     y Recumbent bike x 10 min Seat 9 level 3    Treadmill     NEURO RE-ED  CPT 22446 TOTAL TIME FOR SESSION Not performed    Static balance     Dynamic balance     GAIT TRAINING  CPT  30687  0 min    Working on heel strike and pushoff       stairs Ascend reciprocal " with rail, descend step to with rail     MANUAL  CPT 20797 TOTAL TIME FOR SESSION 10 min   Yes      Y    y STM      Stretching PROM flexion and extension  Joint mobs IT band massage distally, scar massage    0 - 122    Gentle patellar mobs      THER ACT  CPT 32103 TOTAL TIME FOR SESSION 0 Minutes    Assessment for progress note 5/29/2024- WOMAC, 10 MWT, MMT, ROM     Reviewed HEP TID                                    Patient Education Discussed findings on evaluation including anatomy involved and POC moving forward.   Adjusted the height of hurry cane.  Patient educated in the importance of elevation of RLE above her heart ad to use ice 10 min at a time throughout the day. Also discussed sleeping positions for increased comfort. Scar massage education and demonstration 5/6/24 5/29/2024- Discussed findings on assessment and progress towards goals, POC moving forward. Patient verbalized understanding and agreement.

## 2024-06-07 ENCOUNTER — HOSPITAL ENCOUNTER (OUTPATIENT)
Dept: OCCUPATIONAL THERAPY | Facility: REHABILITATION | Age: 59
Setting detail: THERAPIES SERIES
Discharge: HOME | End: 2024-06-07
Attending: PHYSICIAN ASSISTANT
Payer: COMMERCIAL

## 2024-06-07 DIAGNOSIS — R26.9 GAIT ABNORMALITY: ICD-10-CM

## 2024-06-07 DIAGNOSIS — Z96.651 HISTORY OF TOTAL KNEE ARTHROPLASTY, RIGHT: Primary | ICD-10-CM

## 2024-06-07 PROCEDURE — 97140 MANUAL THERAPY 1/> REGIONS: CPT | Mod: GP

## 2024-06-07 PROCEDURE — 97110 THERAPEUTIC EXERCISES: CPT | Mod: GP

## 2024-06-07 NOTE — OP PT TREATMENT LOG
"PT FLOWSHEET  Renee  Cabe  \"Soumya\"    s/p R TKA  Performed PT Exercises Current Session Time    HOT PACK/COLD PACK  CPT 95894   TOTAL TIME FOR SESSION Not performed    Ice Ice massage 2 min  non billed    Heat     THER EX  CPT 10470 TOTAL TIME FOR SESSION   50 Minutes    Updated HEP     MOBILIZING     Heel slides 10 with b/l legs on ball and strap    Term knee ext    y Hamstring stretch 4x20-30 with strap and overpressure on quad    IT band stretch    y Gastroc stretch on board 3 x 30 sec   y Prone quad stretch 4 x 20 sec    Modified Harshil stretch     STRENGTHENING          Ankle pumps    y SLR with quad set on elbows 2x10   y clamshells 2 x 10 orange band    Short  arc quad 20   y Single leg bridging 10x  cues to butt squeeze    Seated LAQ 10 manual resistence conn   y Prone hip extensions 10x2   y Prone knee flexion 10x with OTB con/ecc   y 90-90's 10x2 orange band   y Hip abduction in sidelying 10x2 orange band   y Terminal knee extension with T band 15 green band   y Hip abduction at wall  Press L leg into ball standing on R x10    Standing:   HR/TR     Single leg heel raises x 10, b/l toe raises x20   Y    Y  y Forward step up repeaters with opp march  Lateral step up  Step down, heel touch only 8 inch 2 x 10  regular step ups then repeaters with 8 inch to 12 inch on side  6 inch 1 x 10  4 inch 2 x 10 RUE support    Squats over low mat 2x10 cues to shift weight right   add Sidestepping    add Single leg stance with taps in front and back (slight stance knee flexion)    add Backwards walking    y L Hip abduction pushing ball into wall 10 5 sec hold. Used L UE support    CARDIOVASCULAR     y Recumbent bike x 10 min Seat 9 level 3    Treadmill     NEURO RE-ED  CPT 69574 TOTAL TIME FOR SESSION < 7 min   y Static balance 30 sec floor, 30 sec foam    Dynamic balance     GAIT TRAINING  CPT  94361  0 min    Working on heel strike and pushoff       stairs Ascend reciprocal with rail, descend step to with rail  "    MANUAL  CPT 33753 TOTAL TIME FOR SESSION 10 min   Yes      Y    y STM      Stretching PROM flexion and extension  Joint mobs IT band massage distally, scar massage    0 - 119    Gentle patellar mobs      THER ACT  CPT 75655 TOTAL TIME FOR SESSION 0 Minutes    Assessment for progress note 5/29/2024- WOMAC, 10 MWT, MMT, ROM     Reviewed HEP TID                                    Patient Education Discussed findings on evaluation including anatomy involved and POC moving forward.   Adjusted the height of hurry cane.  Patient educated in the importance of elevation of RLE above her heart ad to use ice 10 min at a time throughout the day. Also discussed sleeping positions for increased comfort. Scar massage education and demonstration 5/6/24 5/29/2024- Discussed findings on assessment and progress towards goals, POC moving forward. Patient verbalized understanding and agreement.

## 2024-06-07 NOTE — PROGRESS NOTES
Physical Therapy Visit    PT DAILY NOTE FOR OUTPATIENT THERAPY    Patient: Renee Bustamante MRN: [id number]  : 1965 58 y.o.  Referring Physician: Magnolia Felton PA C  Date of Visit: 2024    Certification Dates: 24 through 24    Diagnosis:   1. History of total knee arthroplasty, right    2. Gait abnormality        Chief Complaints:  knee pain, gait dysfunction, decreaaed ROM    Precautions:   Existing Precautions/Restrictions: no known precautions/restrictions      TODAY'S VISIT    Time In Session:  Start Time: 0900  Stop Time: 1000  Time Calculation (min): 60 min   History/Vitals/Pain/Encounter Info - 24 0904          Injury History/Precautions/Daily Required Info    Document Type daily treatment     Primary Therapist Xochitl Woods     Chief Complaint/Reason for Visit  knee pain, gait dysfunction, decreaaed ROM     Onset of Illness/Injury or Date of Surgery 24     Referring Physician Magnolia TSE     Existing Precautions/Restrictions no known precautions/restrictions     History of present illness/functional impairment This 58 year old patient is s/p R TKA on 2024 with  at [facility]. She states she tore the meniscus in her R knee while attending barre class in 2023 which was confirmed by MRI in . She had knee pain x 3 years prior to that and had tried cortisone shots ad synvisc No post op complications were reported and she went home the next day. She now presents to OPPT at [facility].     Patient/Family/Caregiver Comments/Observations Arrives to PT walking without SPC. No pain. reported.     Patient reported fall since last visit No        Pain Assessment    Currently in pain No/Denies     Pain Side/Orientation right     Pain: Body location Knee        Pain Intervention    Intervention  TE, MT, gait training     Post Intervention Comments no pain                    Daily Treatment Assessment and Plan - 24 0904          Daily  "Treatment Assessment and Plan    Progress toward goals Progressing     Daily Outcome Summary Mildly antalgic gait without AD, but no pain. Needs cues for exercise recall and good form. Closed chain exercises are challenging, especially step up/downs. Mild swelling persists limiting knee flexion. Advised pt to resume prone lying for passive knee extension. 0-119 degress measured today. Added light resistance to prone hamstring curls.     Plan and Recommendations Consider adding backwards walking resisted or on treadmill.                         OBJECTIVE DATA TAKEN TODAY:    None taken    Today's Treatment:    PT FLOWSHEET  Renee Valentine  \"Soumya\"    s/p R TKA  Performed PT Exercises Current Session Time    HOT PACK/COLD PACK  CPT 27435   TOTAL TIME FOR SESSION Not performed    Ice Ice massage 2 min  non billed    Heat     THER EX  CPT 21388 TOTAL TIME FOR SESSION   50 Minutes    Updated HEP     MOBILIZING     Heel slides 10 with b/l legs on ball and strap    Term knee ext    y Hamstring stretch 4x20-30 with strap and overpressure on quad    IT band stretch    y Gastroc stretch on board 3 x 30 sec   y Prone quad stretch 4 x 20 sec    Modified Harshil stretch     STRENGTHENING          Ankle pumps    y SLR with quad set on elbows 2x10   y clamshells 2 x 10 orange band    Short  arc quad 20   y Single leg bridging 10x  cues to butt squeeze    Seated LAQ 10 manual resistence conn   y Prone hip extensions 10x2   y Prone knee flexion 10x with OTB con/ecc   y 90-90's 10x2 orange band   y Hip abduction in sidelying 10x2 orange band   y Terminal knee extension with T band 15 green band   y Hip abduction at wall  Press L leg into ball standing on R x10    Standing:   HR/TR     Single leg heel raises x 10, b/l toe raises x20   Y    Y  y Forward step up repeaters with opp march  Lateral step up  Step down, heel touch only 8 inch 2 x 10  regular step ups then repeaters with 8 inch to 12 inch on side  6 inch 1 x 10  4 inch 2 " x 10 RUE support    Squats over low mat 2x10 cues to shift weight right   add Sidestepping    add Single leg stance with taps in front and back (slight stance knee flexion)    add Backwards walking    y L Hip abduction pushing ball into wall 10 5 sec hold. Used L UE support    CARDIOVASCULAR     y Recumbent bike x 10 min Seat 9 level 3    Treadmill     NEURO RE-ED  CPT 03820 TOTAL TIME FOR SESSION < 7 min   y Static balance 30 sec floor, 30 sec foam    Dynamic balance     GAIT TRAINING  CPT  02112  0 min    Working on heel strike and pushoff       stairs Ascend reciprocal with rail, descend step to with rail     MANUAL  CPT 02388 TOTAL TIME FOR SESSION 10 min   Yes      Y    y STM      Stretching PROM flexion and extension  Joint mobs IT band massage distally, scar massage    0 - 119    Gentle patellar mobs      THER ACT  CPT 11853 TOTAL TIME FOR SESSION 0 Minutes    Assessment for progress note 5/29/2024- WOMAC, 10 MWT, MMT, ROM     Reviewed HEP TID                                    Patient Education Discussed findings on evaluation including anatomy involved and POC moving forward.   Adjusted the height of hurry cane.  Patient educated in the importance of elevation of RLE above her heart ad to use ice 10 min at a time throughout the day. Also discussed sleeping positions for increased comfort. Scar massage education and demonstration 5/6/24 5/29/2024- Discussed findings on assessment and progress towards goals, POC moving forward. Patient verbalized understanding and agreement.

## 2024-06-10 ENCOUNTER — HOSPITAL ENCOUNTER (OUTPATIENT)
Dept: OCCUPATIONAL THERAPY | Facility: REHABILITATION | Age: 59
Setting detail: THERAPIES SERIES
Discharge: HOME | End: 2024-06-10
Attending: PHYSICIAN ASSISTANT
Payer: COMMERCIAL

## 2024-06-10 DIAGNOSIS — R26.9 GAIT ABNORMALITY: ICD-10-CM

## 2024-06-10 DIAGNOSIS — Z96.651 HISTORY OF TOTAL KNEE ARTHROPLASTY, RIGHT: Primary | ICD-10-CM

## 2024-06-10 PROCEDURE — 97124 MASSAGE THERAPY: CPT | Mod: GP

## 2024-06-10 PROCEDURE — 97110 THERAPEUTIC EXERCISES: CPT | Mod: GP

## 2024-06-10 NOTE — OP PT TREATMENT LOG
"PT FLOWSHEET  Renee Nagy Elena  \"Soumya\"    s/p R TKA  Performed PT Exercises Current Session Time    HOT PACK/COLD PACK  CPT 03565   TOTAL TIME FOR SESSION Not performed    Ice Ice R knee 10 min at end of session non billed    Heat     THER EX  CPT 19657 TOTAL TIME FOR SESSION   45 Minutes    Updated HEP     MOBILIZING     Heel slides 10 with b/l legs on ball and strap    Term knee ext    y Hamstring stretch 4x20-30 with strap and overpressure on quad    IT band stretch     Gastroc stretch on board 3 x 30 sec   y Prone quad stretch 4 x 20 sec    Modified Harshil stretch     STRENGTHENING          Ankle pumps    y SLR with quad set on elbows 2x10    clamshells 2 x 10 orange band    Short  arc quad 20    Single leg bridging 10x  cues to butt squeeze    Seated LAQ 10 manual resistence conn    Prone hip extensions 10x2    Prone knee flexion 10x with OTB con/ecc    90-90's 10x2 orange band    Hip abduction in sidelying 10x2 orange band   y Terminal knee extension with T band 20 ygreen band   y Hip abduction at wall  Press L leg into ball standing on R x10     y Standing:   HR/TR     Single leg heel raises x 15, b/l toe raises x20   Y      y Forward step up repeaters with opp march  Lateral step up  Step down, heel touch only 8 inch 2 x 10  regular step ups then repeaters with 8 inch to 12 inch on side  6 inch 1 x 10  4 inch 2 x 10 RUE support   y Squats over low mat x10 with L leg slighting anterior   add Sidestepping    y Single leg stance with taps in front and back (slight stance knee flexion) 10   y Backwards walking Yellow power cord 20 feet x4   y 4 way kicks in SLS 10    CARDIOVASCULAR     y Recumbent bike x 10 min Seat 9 level 3    Treadmill     NEURO RE-ED  CPT 05734 TOTAL TIME FOR SESSION 5 min   y Static balance 30 sec floor, 30 sec foam   y Resisted walking with yellow power cord     Dynamic balance     GAIT TRAINING  CPT  76954  0 min    Working on heel strike and pushoff       stairs Ascend reciprocal " with rail, descend step to with rail     MANUAL  CPT 92218 TOTAL TIME FOR SESSION 10 min   Yes      Y    y STM      Stretching PROM flexion and extension  Joint mobs IT band massage disance    0 - 119    Gentle patellar mobs      THER ACT  CPT 24136 TOTAL TIME FOR SESSION 0 Minutes    Assessment for progress note 5/29/2024- WOMAC, 10 MWT, MMT, ROM     Reviewed HEP TID                                    Patient Education Discussed findings on evaluation including anatomy involved and POC moving forward.   Adjusted the height of hurry cane.  Patient educated in the importance of elevation of RLE above her heart ad to use ice 10 min at a time throughout the day. Also discussed sleeping positions for increased comfort. Scar massage education and demonstration 5/6/24 5/29/2024- Discussed findings on assessment and progress towards goals, POC moving forward. Patient verbalized understanding and agreement.

## 2024-06-10 NOTE — PROGRESS NOTES
Physical Therapy Visit    PT DAILY NOTE FOR OUTPATIENT THERAPY    Patient: Renee Bustamante MRN: [id number]  : 1965 58 y.o.  Referring Physician: Magnolia Felton PA C  Date of Visit: 6/10/2024    Certification Dates: 24 through 24    Diagnosis:   1. History of total knee arthroplasty, right    2. Gait abnormality        Chief Complaints:  knee pain, gait dysfunction, decreaaed ROM    Precautions:   Existing Precautions/Restrictions: no known precautions/restrictions      TODAY'S VISIT    Time In Session:  Start Time: 0900  Stop Time: 1000  Time Calculation (min): 60 min   History/Vitals/Pain/Encounter Info - 06/10/24 0906          Injury History/Precautions/Daily Required Info    Document Type daily treatment     Primary Therapist Xochitl Woods     Chief Complaint/Reason for Visit  knee pain, gait dysfunction, decreaaed ROM     Onset of Illness/Injury or Date of Surgery 24     Referring Physician Magnolia TSE     Existing Precautions/Restrictions no known precautions/restrictions     History of present illness/functional impairment This 58 year old patient is s/p R TKA on 2024 with  at [facility]. She states she tore the meniscus in her R knee while attending barre class in 2023 which was confirmed by MRI in . She had knee pain x 3 years prior to that and had tried cortisone shots ad synvisc No post op complications were reported and she went home the next day. She now presents to OPPT at [facility].     Patient/Family/Caregiver Comments/Observations Pt reports she has been feeling less mobility in last few days. Has been spending alot of time with her Brother who was in the hospital. She admits to not doing as much exercises last few days.     Patient reported fall since last visit No        Pain Assessment    Currently in pain No/Denies   discribes as stiffness                   Daily Treatment Assessment and Plan - 06/10/24 09          Daily  "Treatment Assessment and Plan    Progress toward goals Progressing     Daily Outcome Summary Pt had feeling more stiff coming into therapy today. She was alittle limited in obtaining full extension but post manual work she was at 0 degrees. Added resisted walking and backwards walking with power cord.  Improved heel strike nad push off noted. Pt needs reminders to improve her gait pattern.     Plan and Recommendations Continue to progress as toelrated. Reinforce HEP                         OBJECTIVE DATA TAKEN TODAY:    None taken    Today's Treatment:    PT FLOWSHEET  Renee Yakov Millermacho  \"Soumya\"    s/p R TKA  Performed PT Exercises Current Session Time    HOT PACK/COLD PACK  CPT 47677   TOTAL TIME FOR SESSION Not performed    Ice Ice R knee 10 min at end of session non billed    Heat     THER EX  CPT 48977 TOTAL TIME FOR SESSION   45 Minutes    Updated HEP     MOBILIZING     Heel slides 10 with b/l legs on ball and strap    Term knee ext    y Hamstring stretch 4x20-30 with strap and overpressure on quad    IT band stretch     Gastroc stretch on board 3 x 30 sec   y Prone quad stretch 4 x 20 sec    Modified Harshil stretch     STRENGTHENING          Ankle pumps    y SLR with quad set on elbows 2x10    clamshells 2 x 10 orange band    Short  arc quad 20    Single leg bridging 10x  cues to butt squeeze    Seated LAQ 10 manual resistence conn    Prone hip extensions 10x2    Prone knee flexion 10x with OTB con/ecc    90-90's 10x2 orange band    Hip abduction in sidelying 10x2 orange band   y Terminal knee extension with T band 20 ygreen band   y Hip abduction at wall  Press L leg into ball standing on R x10     y Standing:   HR/TR     Single leg heel raises x 15, b/l toe raises x20   Y      y Forward step up repeaters with opp march  Lateral step up  Step down, heel touch only 8 inch 2 x 10  regular step ups then repeaters with 8 inch to 12 inch on side  6 inch 1 x 10  4 inch 2 x 10 RUE support   y Squats over low mat " x10 with L leg slighting anterior   add Sidestepping    y Single leg stance with taps in front and back (slight stance knee flexion) 10   y Backwards walking Yellow power cord 20 feet x4   y 4 way kicks in SLS 10    CARDIOVASCULAR     y Recumbent bike x 10 min Seat 9 level 3    Treadmill     NEURO RE-ED  CPT 09211 TOTAL TIME FOR SESSION 5 min   y Static balance 30 sec floor, 30 sec foam   y Resisted walking with yellow power cord     Dynamic balance     GAIT TRAINING  CPT  03707  0 min    Working on heel strike and pushoff       stairs Ascend reciprocal with rail, descend step to with rail     MANUAL  CPT 40934 TOTAL TIME FOR SESSION 10 min   Yes      Y    y STM      Stretching PROM flexion and extension  Joint mobs IT band massage disance    0 - 119    Gentle patellar mobs      THER ACT  CPT 69518 TOTAL TIME FOR SESSION 0 Minutes    Assessment for progress note 5/29/2024- WOMAC, 10 MWT, MMT, ROM     Reviewed HEP TID                                    Patient Education Discussed findings on evaluation including anatomy involved and POC moving forward.   Adjusted the height of hurry cane.  Patient educated in the importance of elevation of RLE above her heart ad to use ice 10 min at a time throughout the day. Also discussed sleeping positions for increased comfort. Scar massage education and demonstration 5/6/24 5/29/2024- Discussed findings on assessment and progress towards goals, POC moving forward. Patient verbalized understanding and agreement.

## 2024-06-14 ENCOUNTER — HOSPITAL ENCOUNTER (OUTPATIENT)
Dept: OCCUPATIONAL THERAPY | Facility: REHABILITATION | Age: 59
Setting detail: THERAPIES SERIES
Discharge: HOME | End: 2024-06-14
Attending: PHYSICIAN ASSISTANT
Payer: COMMERCIAL

## 2024-06-14 DIAGNOSIS — Z96.651 HISTORY OF TOTAL KNEE ARTHROPLASTY, RIGHT: Primary | ICD-10-CM

## 2024-06-14 DIAGNOSIS — R26.9 GAIT ABNORMALITY: ICD-10-CM

## 2024-06-14 PROCEDURE — 97110 THERAPEUTIC EXERCISES: CPT | Mod: GP

## 2024-06-14 PROCEDURE — 97140 MANUAL THERAPY 1/> REGIONS: CPT | Mod: GP

## 2024-06-14 RX ORDER — VENLAFAXINE HYDROCHLORIDE 75 MG/1
75 CAPSULE, EXTENDED RELEASE ORAL DAILY
Qty: 90 CAPSULE | Refills: 1 | Status: SHIPPED | OUTPATIENT
Start: 2024-06-14 | End: 2024-11-13

## 2024-06-14 RX ORDER — ESOMEPRAZOLE MAGNESIUM 40 MG/1
40 CAPSULE, DELAYED RELEASE ORAL
Qty: 90 CAPSULE | Refills: 1 | Status: SHIPPED | OUTPATIENT
Start: 2024-06-14 | End: 2024-11-13

## 2024-06-14 RX ORDER — VENLAFAXINE 75 MG/1
75 TABLET ORAL 2 TIMES DAILY
Qty: 180 TABLET | Refills: 0 | OUTPATIENT
Start: 2024-06-14 | End: 2024-09-12

## 2024-06-14 RX ORDER — PROGESTERONE 100 MG/1
100 CAPSULE ORAL DAILY
COMMUNITY
End: 2024-12-30 | Stop reason: ALTCHOICE

## 2024-06-14 RX ORDER — ESTRADIOL 1 MG/1
1 TABLET ORAL DAILY
COMMUNITY
Start: 2024-05-21

## 2024-06-14 NOTE — OP PT TREATMENT LOG
"PT FLOWSHEET  Renee  Cabmacho  \"Soumya\"    s/p R TKA  Performed PT Exercises Current Session Time    HOT PACK/COLD PACK  CPT 91591   TOTAL TIME FOR SESSION Not performed    Ice Ice R knee 10 min at end of session non billed    Heat     THER EX  CPT 36686 TOTAL TIME FOR SESSION   50 Minutes    Updated HEP     MOBILIZING     Heel slides 10 with b/l legs on ball and strap    Term knee ext    y Hamstring stretch 4x20-30 with strap and overpressure on quad    IT band stretch     Gastroc stretch on board 3 x 30 sec   y Prone quad stretch 4 x 20 sec    Modified Harshil stretch     STRENGTHENING          Ankle pumps    y SLR with quad set on elbows 2x10    clamshells 2 x 10 orange band    Short  arc quad 20    Single leg bridging 10x  cues to butt squeeze    Seated LAQ 10 manual resistence conn    Prone hip extensions 10x2    Prone knee flexion 10x with OTB con/ecc    90-90's 10x2 orange band    Hip abduction in sidelying 10x2 orange band   y Terminal knee extension with T band 10x 2 green band    Hip abduction at wall  Press L leg into ball standing on R x10     y Standing:   HR/TR     Single leg heel raises x 15, b/l toe raises x20   Y      y Forward step up repeaters with opp march  Lateral step up  Step down, heel touch only 8 inch 2 x 10  regular step ups then repeaters with 8 inch to 12 inch on side  6 inch 1 x 10  4 inch 2 x 10 no RUE support   y Squats  2x10 with L leg slighting anterior   y Sidestepping 15 feet x 4    Single leg stance with taps in front and back (slight stance knee flexion) 10    Backwards walking Yellow power cord 20 feet x4   y 4 way kicks in SLS 1 x 10 B with OTB    CARDIOVASCULAR     y Recumbent bike x 10 min Seat 9 level 3-4    Treadmill     NEURO RE-ED  CPT 72005 TOTAL TIME FOR SESSION 0 min    Static balance 30 sec floor, 30 sec foam    Resisted walking with yellow power cord     Dynamic balance     GAIT TRAINING  CPT  39418  0 min    Working on heel strike and pushoff       stairs Ascend " reciprocal with rail, descend step to with rail     MANUAL  CPT 62302 TOTAL TIME FOR SESSION 10 min   Yes      Y    y STM      Stretching PROM flexion and extension  Joint mobs IT band massage disance    0 - 119    Gentle patellar mobs      THER ACT  CPT 31102 TOTAL TIME FOR SESSION 0 Minutes    Assessment for progress note 5/29/2024- WOMAC, 10 MWT, MMT, ROM     Reviewed HEP TID                                    Patient Education Discussed findings on evaluation including anatomy involved and POC moving forward.   Adjusted the height of hurry cane.  Patient educated in the importance of elevation of RLE above her heart ad to use ice 10 min at a time throughout the day. Also discussed sleeping positions for increased comfort. Scar massage education and demonstration 5/6/24 5/29/2024- Discussed findings on assessment and progress towards goals, POC moving forward. Patient verbalized understanding and agreement.

## 2024-06-14 NOTE — PROGRESS NOTES
Physical Therapy Visit    PT DAILY NOTE FOR OUTPATIENT THERAPY    Patient: Renee Bustamante MRN: [id number]  : 1965 58 y.o.  Referring Physician: Magnolia Felton PA C  Date of Visit: 2024    Certification Dates: 24 through 24    Diagnosis:   1. History of total knee arthroplasty, right    2. Gait abnormality        Chief Complaints:  knee pain, gait dysfunction, decreaaed ROM    Precautions:   Existing Precautions/Restrictions: no known precautions/restrictions      TODAY'S VISIT    Time In Session:  Start Time: 0900  Stop Time: 1000  Time Calculation (min): 60 min   History/Vitals/Pain/Encounter Info - 24 0904          Injury History/Precautions/Daily Required Info    Document Type daily treatment     Primary Therapist Xochitl Woods     Chief Complaint/Reason for Visit  knee pain, gait dysfunction, decreaaed ROM     Onset of Illness/Injury or Date of Surgery 24     Referring Physician Magnolia TSE     Existing Precautions/Restrictions no known precautions/restrictions     History of present illness/functional impairment This 58 year old patient is s/p R TKA on 2024 with  at [facility]. She states she tore the meniscus in her R knee while attending barre class in 2023 which was confirmed by MRI in . She had knee pain x 3 years prior to that and had tried cortisone shots ad synvisc No post op complications were reported and she went home the next day. She now presents to OPPT at [facility].     Patient/Family/Caregiver Comments/Observations Tiffanie reports no pain in R knee,  only stiffness. She feels that her knee is more swollen these days.     Patient reported fall since last visit No        Pain Assessment    Currently in pain Yes     Preferred Pain Scale number (Numeric Rating Pain Scale)     Pain Side/Orientation right     Pain: Body location Knee     Pain Rating (0-10): Pre Activity 0     Pain Rating (0-10): Post Activity 1         "Pain Intervention    Intervention  MT, TE     Post Intervention Comments 1/10 soreness at end of hour                    Daily Treatment Assessment and Plan - 06/14/24 1531          Daily Treatment Assessment and Plan    Progress toward goals Progressing     Daily Outcome Summary Began session with active warm up on bike followed by manual treatment and stretching. TE continued with TKE with GTB and progression to PTB for s/l TE. Also added OTB for 4 way kick bilaterally. Soumya is demonstrating improved stability in R knee and was able to perform lateral heel taps without UE support today. AROM R knee is 0-119 degrees and PROM is 0-125 degrees. She tolerated session well with 1/10 soreness R knee at the end of the hour.     Plan and Recommendations Continue to progress ROM and strengthening as tolerated. Resume gait training with yellow power cord.                     Today's Treatment:    PT FLOWSHEET  Renee Valentine  \"Soumya\"    s/p R TKA  Performed PT Exercises Current Session Time    HOT PACK/COLD PACK  CPT 69667   TOTAL TIME FOR SESSION Not performed    Ice Ice R knee 10 min at end of session non billed    Heat     THER EX  CPT 63952 TOTAL TIME FOR SESSION   50 Minutes    Updated HEP     MOBILIZING     Heel slides 10 with b/l legs on ball and strap    Term knee ext    y Hamstring stretch 4x20-30 with strap and overpressure on quad    IT band stretch     Gastroc stretch on board 3 x 30 sec   y Prone quad stretch 4 x 20 sec    Modified Harshil stretch     STRENGTHENING          Ankle pumps    y SLR with quad set on elbows 2x10    clamshells 2 x 10 orange band    Short  arc quad 20    Single leg bridging 10x  cues to butt squeeze    Seated LAQ 10 manual resistence conn    Prone hip extensions 10x2    Prone knee flexion 10x with OTB con/ecc    90-90's 10x2 orange band    Hip abduction in sidelying 10x2 orange band   y Terminal knee extension with T band 10x 2 green band    Hip abduction at wall  Press L leg " into ball standing on R x10     y Standing:   HR/TR     Single leg heel raises x 15, b/l toe raises x20   Y      y Forward step up repeaters with opp march  Lateral step up  Step down, heel touch only 8 inch 2 x 10  regular step ups then repeaters with 8 inch to 12 inch on side  6 inch 1 x 10  4 inch 2 x 10 no RUE support   y Squats  2x10 with L leg slighting anterior   y Sidestepping 15 feet x 4    Single leg stance with taps in front and back (slight stance knee flexion) 10    Backwards walking Yellow power cord 20 feet x4   y 4 way kicks in SLS 1 x 10 B with OTB    CARDIOVASCULAR     y Recumbent bike x 10 min Seat 9 level 3-4    Treadmill     NEURO RE-ED  CPT 45819 TOTAL TIME FOR SESSION 0 min    Static balance 30 sec floor, 30 sec foam    Resisted walking with yellow power cord     Dynamic balance     GAIT TRAINING  CPT  12990  0 min    Working on heel strike and pushoff       stairs Ascend reciprocal with rail, descend step to with rail     MANUAL  CPT 93609 TOTAL TIME FOR SESSION 10 min   Yes      Y    y STM      Stretching PROM flexion and extension  Joint mobs IT band massage disance    0 - 119    Gentle patellar mobs      THER ACT  CPT 06013 TOTAL TIME FOR SESSION 0 Minutes    Assessment for progress note 5/29/2024- WOMAC, 10 MWT, MMT, ROM     Reviewed HEP TID                                    Patient Education Discussed findings on evaluation including anatomy involved and POC moving forward.   Adjusted the height of hurry cane.  Patient educated in the importance of elevation of RLE above her heart ad to use ice 10 min at a time throughout the day. Also discussed sleeping positions for increased comfort. Scar massage education and demonstration 5/6/24 5/29/2024- Discussed findings on assessment and progress towards goals, POC moving forward. Patient verbalized understanding and agreement.

## 2024-06-14 NOTE — TELEPHONE ENCOUNTER
"Medicine Refill Request    Last Office Visit: Visit date not found   Last Consult Visit: 3/14/2024  Last Telemedicine Visit: Visit date not found    Next Appointment: Visit date not found      Current Outpatient Medications:     esomeprazole (NexIUM) 40 mg capsule, Take 1 capsule (40 mg total) by mouth daily before breakfast., Disp: 90 capsule, Rfl: 1    estradioL (ESTRACE) 1 mg tablet, Take 1 mg by mouth daily., Disp: , Rfl:     venlafaxine XR (EFFEXOR XR) 75 mg 24 hr capsule, Take 1 capsule (75 mg total) by mouth daily., Disp: 90 capsule, Rfl: 1    ALPRAZolam (XANAX) 0.25 mg tablet, Take 1 tablet (0.25 mg total) by mouth 3 (three) times a day as needed for anxiety (flight). One to two tabs 1 hour prior to flight, Disp: 12 tablet, Rfl: 0    cholecalciferol, vitamin D3, 5,000 unit (125 mcg) tablet, Take 5,000 Units by mouth daily., Disp: , Rfl:     progesterone (PROMETRIUM) 100 mg capsule, Take 100 mg by mouth daily., Disp: , Rfl:       BP Readings from Last 3 Encounters:   03/14/24 132/80   12/06/22 126/70   08/26/22 112/76       Recent Lab results:  Lab Results   Component Value Date    CHOL 215 (H) 05/19/2022   ,   Lab Results   Component Value Date    HDL 53 05/19/2022   ,   Lab Results   Component Value Date    LDLCALC 110 (H) 05/19/2022   ,   Lab Results   Component Value Date    TRIG 307 (H) 05/19/2022        Lab Results   Component Value Date    GLUCOSE 97 08/24/2022   , No results found for: \"HGBA1C\"      Lab Results   Component Value Date    CREATININE 0.7 08/24/2022       Lab Results   Component Value Date    TSH 1.47 08/24/2022         No results found for: \"HGBA1C\"  "

## 2024-06-17 ENCOUNTER — HOSPITAL ENCOUNTER (OUTPATIENT)
Dept: OCCUPATIONAL THERAPY | Facility: REHABILITATION | Age: 59
Setting detail: THERAPIES SERIES
Discharge: HOME | End: 2024-06-17
Attending: PHYSICIAN ASSISTANT
Payer: COMMERCIAL

## 2024-06-17 DIAGNOSIS — R26.9 GAIT ABNORMALITY: ICD-10-CM

## 2024-06-17 DIAGNOSIS — Z96.651 HISTORY OF TOTAL KNEE ARTHROPLASTY, RIGHT: Primary | ICD-10-CM

## 2024-06-17 PROCEDURE — 97112 NEUROMUSCULAR REEDUCATION: CPT | Mod: GP

## 2024-06-17 PROCEDURE — 97110 THERAPEUTIC EXERCISES: CPT | Mod: GP

## 2024-06-17 NOTE — PROGRESS NOTES
Physical Therapy Visit    PT DAILY NOTE FOR OUTPATIENT THERAPY    Patient: Renee Bustamante MRN: [id number]  : 1965 58 y.o.  Referring Physician: Magnolia Felton PA C  Date of Visit: 2024    Certification Dates: 24 through 24    Diagnosis:   1. History of total knee arthroplasty, right    2. Gait abnormality        Chief Complaints:  knee pain, gait dysfunction, decreaaed ROM    Precautions:   Existing Precautions/Restrictions: no known precautions/restrictions      TODAY'S VISIT    Time In Session:      History/Vitals/Pain/Encounter Info - 24 1008          Injury History/Precautions/Daily Required Info    Document Type daily treatment     Primary Therapist Xochitl Woods     Chief Complaint/Reason for Visit  knee pain, gait dysfunction, decreaaed ROM     Onset of Illness/Injury or Date of Surgery 24     Referring Physician Magnolia TSE     Existing Precautions/Restrictions no known precautions/restrictions     History of present illness/functional impairment This 58 year old patient is s/p R TKA on 2024 with  at [facility]. She states she tore the meniscus in her R knee while attending barre class in 2023 which was confirmed by MRI in . She had knee pain x 3 years prior to that and had tried cortisone shots ad synvisc No post op complications were reported and she went home the next day. She now presents to OPPT at [facility].     Patient/Family/Caregiver Comments/Observations Patient reports feeling well today. R knee pain 0/10 on arrival, just stiffness.     Patient reported fall since last visit No        Pain Assessment    Currently in pain No/Denies        Pain Intervention    Intervention  TE, NMRE     Post Intervention Comments No pain t/o session                    Daily Treatment Assessment and Plan - 24 1124          Daily Treatment Assessment and Plan    Progress toward goals Progressing     Daily Outcome Summary Began with  "active warm up followed by stretching and Mat program with addition of 2 # weights B for SLR with TAC. She also progressed to fwd and lateral step up repeaters with increased reps. Continued tband 4 way kicks and added dynamic balance activities with high marching and band slides followed by walking with yellow power cord with verbal cues for TKE with backward walking. Soumya tolerated the session well with no pain at the end of the hour.     Plan and Recommendations Continue to progress ROM and strengthening.  Resume squats and add lunges onto BOSU n/v.                     Today's Treatment:    PT FLOWSHEET  Renee Valentine  \"Soumya\"    s/p R TKA  Performed PT Exercises Current Session Time    HOT PACK/COLD PACK  CPT 80416   TOTAL TIME FOR SESSION Not performed    Ice Ice R knee 10 min at end of session non billed    Heat     THER EX  CPT 74759 TOTAL TIME FOR SESSION   45 Minutes    Updated HEP     MOBILIZING    y Heel slides 10 with b/l legs on ball and strap    Term knee ext    y Hamstring stretch 4x20-30 with strap and overpressure on quad    IT band stretch     Gastroc stretch on board 3 x 30 sec   y Prone quad stretch 4 x 20 sec    Modified Harshil stretch     STRENGTHENING          Ankle pumps    y SLR with quad set on elbows 2x10 with 2 # weight    clamshells 2 x 10 orange band    Short  arc quad 20    Single leg bridging 10x  cues to butt squeeze    Seated LAQ 10 manual resistence conn    Prone hip extensions 10x2    Prone knee flexion 10x with OTB con/ecc    90-90's 10x2 orange band    Hip abduction in sidelying 10x2 orange band   y Terminal knee extension with T band 10x 2 green band    Hip abduction at wall  Press L leg into ball standing on R x10     y Standing:   HR/TR     Single leg heel raises x 15, b/l toe raises x20   Y      y Forward step up repeaters with opp march  Lateral step up  Step down, heel touch only 8 inch 2 x 10  regular step ups then repeaters with 8 inch   6 inch 1 x 10 B with opp " abd  4 inch 2 x 10 no RUE support   y Squats  2x10 with L leg slighting anterior    Sidestepping See below    Single leg stance with taps in front and back (slight stance knee flexion) 10    Backwards walking Yellow power cord 20 feet x4   y 4 way kicks in SLS 1 x 10 B with OTB    CARDIOVASCULAR     y Recumbent bike x 10 min Seat 9 level 3-4    Treadmill     NEURO RE-ED  CPT 22210 TOTAL TIME FOR SESSION 15 min     y Static balance 30 sec floor, 30 sec foam  SLS with ball toss on floor 1 x 10 , then on foam 2 x 10   y Resisted walking with yellow power cord Fwd and bkwd 100 feet each   y Dynamic balance High marching 30 feet x 2  Sidestepping 25 feet x 4 with BTB    GAIT TRAINING  CPT  36171  0 min    Working on heel strike and pushoff       stairs Ascend reciprocal with rail, descend step to with rail     MANUAL  CPT 99235 TOTAL TIME FOR SESSION Not performed   Yes      Y    y STM      Stretching PROM flexion and extension  Joint mobs IT band massage disance    0 - 119    Gentle patellar mobs      THER ACT  CPT 16948 TOTAL TIME FOR SESSION 0 Minutes    Assessment for progress note 5/29/2024- WOMAC, 10 MWT, MMT, ROM     Reviewed HEP TID                                    Patient Education Discussed findings on evaluation including anatomy involved and POC moving forward.   Adjusted the height of hurry cane.  Patient educated in the importance of elevation of RLE above her heart ad to use ice 10 min at a time throughout the day. Also discussed sleeping positions for increased comfort. Scar massage education and demonstration 5/6/24 5/29/2024- Discussed findings on assessment and progress towards goals, POC moving forward. Patient verbalized understanding and agreement.

## 2024-06-17 NOTE — OP PT TREATMENT LOG
"PT FLOWSHEET  Renee Nagy Cabe  \"Soumya\"    s/p R TKA  Performed PT Exercises Current Session Time    HOT PACK/COLD PACK  CPT 12850   TOTAL TIME FOR SESSION Not performed    Ice Ice R knee 10 min at end of session non billed    Heat     THER EX  CPT 25457 TOTAL TIME FOR SESSION   45 Minutes    Updated HEP     MOBILIZING    y Heel slides 10 with b/l legs on ball and strap    Term knee ext    y Hamstring stretch 4x20-30 with strap and overpressure on quad    IT band stretch     Gastroc stretch on board 3 x 30 sec   y Prone quad stretch 4 x 20 sec    Modified Harshil stretch     STRENGTHENING          Ankle pumps    y SLR with quad set on elbows 2x10 with 2 # weight    clamshells 2 x 10 orange band    Short  arc quad 20    Single leg bridging 10x  cues to butt squeeze    Seated LAQ 10 manual resistence conn    Prone hip extensions 10x2    Prone knee flexion 10x with OTB con/ecc    90-90's 10x2 orange band    Hip abduction in sidelying 10x2 orange band   y Terminal knee extension with T band 10x 2 green band    Hip abduction at wall  Press L leg into ball standing on R x10     y Standing:   HR/TR     Single leg heel raises x 15, b/l toe raises x20   Y      y Forward step up repeaters with opp march  Lateral step up  Step down, heel touch only 8 inch 2 x 10  regular step ups then repeaters with 8 inch   6 inch 1 x 10 B with opp abd  4 inch 2 x 10 no RUE support   y Squats  2x10 with L leg slighting anterior    Sidestepping See below    Single leg stance with taps in front and back (slight stance knee flexion) 10    Backwards walking Yellow power cord 20 feet x4   y 4 way kicks in SLS 1 x 10 B with OTB    CARDIOVASCULAR     y Recumbent bike x 10 min Seat 9 level 3-4    Treadmill     NEURO RE-ED  CPT 97723 TOTAL TIME FOR SESSION 15 min     y Static balance 30 sec floor, 30 sec foam  SLS with ball toss on floor 1 x 10 , then on foam 2 x 10   y Resisted walking with yellow power cord Fwd and bkwd 100 feet each   y Dynamic " balance High marching 30 feet x 2  Sidestepping 25 feet x 4 with BTB    GAIT TRAINING  CPT  24835  0 min    Working on heel strike and pushoff       stairs Ascend reciprocal with rail, descend step to with rail     MANUAL  CPT 88043 TOTAL TIME FOR SESSION Not performed   Yes      Y    y STM      Stretching PROM flexion and extension  Joint mobs IT band massage disance    0 - 119    Gentle patellar mobs      THER ACT  CPT 01111 TOTAL TIME FOR SESSION 0 Minutes    Assessment for progress note 5/29/2024- WOMAC, 10 MWT, MMT, ROM     Reviewed HEP TID                                    Patient Education Discussed findings on evaluation including anatomy involved and POC moving forward.   Adjusted the height of hurry cane.  Patient educated in the importance of elevation of RLE above her heart ad to use ice 10 min at a time throughout the day. Also discussed sleeping positions for increased comfort. Scar massage education and demonstration 5/6/24 5/29/2024- Discussed findings on assessment and progress towards goals, POC moving forward. Patient verbalized understanding and agreement.

## 2024-06-21 ENCOUNTER — HOSPITAL ENCOUNTER (OUTPATIENT)
Dept: OCCUPATIONAL THERAPY | Facility: REHABILITATION | Age: 59
Setting detail: THERAPIES SERIES
Discharge: HOME | End: 2024-06-21
Attending: PHYSICIAN ASSISTANT
Payer: COMMERCIAL

## 2024-06-21 DIAGNOSIS — Z96.651 HISTORY OF TOTAL KNEE ARTHROPLASTY, RIGHT: Primary | ICD-10-CM

## 2024-06-21 DIAGNOSIS — R26.9 GAIT ABNORMALITY: ICD-10-CM

## 2024-06-21 PROCEDURE — 97110 THERAPEUTIC EXERCISES: CPT | Mod: GP

## 2024-06-21 PROCEDURE — 97124 MASSAGE THERAPY: CPT | Mod: GP

## 2024-06-21 PROCEDURE — 97112 NEUROMUSCULAR REEDUCATION: CPT | Mod: GP

## 2024-06-21 NOTE — OP PT TREATMENT LOG
"PT FLOWSHEET  Renee  Cabe  \"Soumya\"    s/p R TKA  Performed PT Exercises Current Session Time    HOT PACK/COLD PACK  CPT 58279   TOTAL TIME FOR SESSION Not performed    Ice Ice R knee 10 min at end of session non billed    Heat     THER EX  CPT 34040 TOTAL TIME FOR SESSION   35 Minutes    Updated HEP     MOBILIZING    y Heel slides 10 with b/l legs on ball and strap    Term knee ext    y Hamstring stretch 4x20-30 with strap and overpressure on quad    IT band stretch     Gastroc stretch on board 3 x 30 sec   y Prone quad stretch 4 x 20 sec    Modified Harshil stretch     STRENGTHENING          Ankle pumps    y SLR with quad set on hands 2x10 with 2 # weight    clamshells 2 x 10 orange band    Short  arc quad 20   y Single leg bridging 10x2  cues to butt squeeze    Seated LAQ 10 manual resistence conn    Prone hip extensions 10x2    Prone knee flexion 10x with OTB con/ecc    90-90's 10x2 orange band   y Hamstring curls on large blue ball 2x10    Hip abduction in sidelying 10x2 orange band    Terminal knee extension with T band 10x 2 green band   y Sidelying donkey Kick 2x10    Hip abduction at wall  Press L leg into ball standing on R x10   y Standing:   HR/TR     Single leg heel raises x 20, b/l toe raises x20   Y    Y  y Forward step up repeaters     Lateral step up  Step down, heel touch only 8 inch 2 x 10  repeaters with 8 inch to 12 inch on side  6 inch 1 x 10 B with opp abd  4 inch 2 x 10 no RUE support    Squats  2x10 with L leg slighting anterior                            y lunges 10 with each foot in front    Sidestepping See below   y Single leg stance with taps in front and back (slight stance knee flexion) 20   y Backwards walking Yellow power cord 20 feet x4   y 4 way kicks in SLS 1 x 10 B with OTB    CARDIOVASCULAR     y Recumbent bike x 10 min Seat 9 level 3-4    Treadmill     NEURO RE-ED  CPT 15675 TOTAL TIME FOR SESSION 10 min     y Static balance 30 sec floor, 30 sec foam  SLS with ball toss " on floor 1 x 10 , then on foam 2 x 10   y Resisted walking with yellow power cord Fwd and bkwd 100 feet each moderate amount of tension on yellow cord   y Dynamic balance High marching 30 feet x 2  Sidestepping 25 feet x 4 with BTB    GAIT TRAINING  CPT  38593  0 min    Working on heel strike and pushoff       stairs Ascend reciprocal with rail, descend step to with rail     MANUAL  CPT 72253 TOTAL TIME FOR SESSION 15 min   Yes      Y    y STM      Stretching PROM flexion and extension  Joint mobs IT band massage disance    0 - 125    Gentle patellar mobs      THER ACT  CPT 72143 TOTAL TIME FOR SESSION 0 Minutes    Assessment for progress note 5/29/2024- WOMAC, 10 MWT, MMT, ROM     Reviewed HEP TID                                    Patient Education Discussed findings on evaluation including anatomy involved and POC moving forward.   Adjusted the height of hurry cane.  Patient educated in the importance of elevation of RLE above her heart ad to use ice 10 min at a time throughout the day. Also discussed sleeping positions for increased comfort. Scar massage education and demonstration 5/6/24 5/29/2024- Discussed findings on assessment and progress towards goals, POC moving forward. Patient verbalized understanding and agreement.

## 2024-06-21 NOTE — PROGRESS NOTES
Physical Therapy Visit    PT DAILY NOTE FOR OUTPATIENT THERAPY    Patient: Renee Bustamante MRN: [id number]  : 1965 58 y.o.  Referring Physician: Magnolia Felton PA C  Date of Visit: 2024    Certification Dates: 24 through 24    Diagnosis:   1. History of total knee arthroplasty, right    2. Gait abnormality        Chief Complaints:  knee pain, gait dysfunction, decreaaed ROM    Precautions:   Existing Precautions/Restrictions: no known precautions/restrictions      TODAY'S VISIT    Time In Session:  Start Time: 1100  Stop Time: 1200  Time Calculation (min): 60 min   History/Vitals/Pain/Encounter Info - 24 1110          Injury History/Precautions/Daily Required Info    Document Type daily treatment     Primary Therapist Xochitl Woods     Chief Complaint/Reason for Visit  knee pain, gait dysfunction, decreaaed ROM     Onset of Illness/Injury or Date of Surgery 24     Referring Physician Magnolia TSE     Existing Precautions/Restrictions no known precautions/restrictions     History of present illness/functional impairment This 58 year old patient is s/p R TKA on 2024 with  at [facility]. She states she tore the meniscus in her R knee while attending barre class in 2023 which was confirmed by MRI in . She had knee pain x 3 years prior to that and had tried cortisone shots ad synvisc No post op complications were reported and she went home the next day. She now presents to OPPT at [facility].     Patient/Family/Caregiver Comments/Observations Pt arrived on time today for treatment. She is reporting no pain     Patient reported fall since last visit No        Pain Assessment    Currently in pain No/Denies                    Daily Treatment Assessment and Plan - 24 1110          Daily Treatment Assessment and Plan    Progress toward goals Progressing     Daily Outcome Summary Pt doing well with all her exercises. Improved wt shifting and  "ROM noted R knee. Even though she had a little swelling in knee today we were able to get 125 degrees of flexion. Doing well with the single leg stance activities including on the foam. Added some lunges today. Did well     Plan and Recommendations continue to progress as toelrated. Add lunges on BOSU                         OBJECTIVE DATA TAKEN TODAY:    None taken    Today's Treatment:    PT FLOWSHEET  Renee Valentine  \"Soumya\"    s/p R TKA  Performed PT Exercises Current Session Time    HOT PACK/COLD PACK  CPT 51540   TOTAL TIME FOR SESSION Not performed    Ice Ice R knee 10 min at end of session non billed    Heat     THER EX  CPT 93860 TOTAL TIME FOR SESSION   35 Minutes    Updated HEP     MOBILIZING    y Heel slides 10 with b/l legs on ball and strap    Term knee ext    y Hamstring stretch 4x20-30 with strap and overpressure on quad    IT band stretch     Gastroc stretch on board 3 x 30 sec   y Prone quad stretch 4 x 20 sec    Modified Harshil stretch     STRENGTHENING          Ankle pumps    y SLR with quad set on hands 2x10 with 2 # weight    clamshells 2 x 10 orange band    Short  arc quad 20   y Single leg bridging 10x2  cues to butt squeeze    Seated LAQ 10 manual resistence conn    Prone hip extensions 10x2    Prone knee flexion 10x with OTB con/ecc    90-90's 10x2 orange band   y Hamstring curls on large blue ball 2x10    Hip abduction in sidelying 10x2 orange band    Terminal knee extension with T band 10x 2 green band   y Sidelying donkey Kick 2x10    Hip abduction at wall  Press L leg into ball standing on R x10   y Standing:   HR/TR     Single leg heel raises x 20, b/l toe raises x20   Y    Y  y Forward step up repeaters     Lateral step up  Step down, heel touch only 8 inch 2 x 10  repeaters with 8 inch to 12 inch on side  6 inch 1 x 10 B with opp abd  4 inch 2 x 10 no RUE support    Squats  2x10 with L leg slighting anterior                            y lunges 10 with each foot in front    " Sidestepping See below   y Single leg stance with taps in front and back (slight stance knee flexion) 20   y Backwards walking Yellow power cord 20 feet x4   y 4 way kicks in SLS 1 x 10 B with OTB    CARDIOVASCULAR     y Recumbent bike x 10 min Seat 9 level 3-4    Treadmill     NEURO RE-ED  CPT 19844 TOTAL TIME FOR SESSION 10 min     y Static balance 30 sec floor, 30 sec foam  SLS with ball toss on floor 1 x 10 , then on foam 2 x 10   y Resisted walking with yellow power cord Fwd and bkwd 100 feet each moderate amount of tension on yellow cord   y Dynamic balance High marching 30 feet x 2  Sidestepping 25 feet x 4 with BTB    GAIT TRAINING  CPT  20239  0 min    Working on heel strike and pushoff       stairs Ascend reciprocal with rail, descend step to with rail     MANUAL  CPT 54706 TOTAL TIME FOR SESSION 15 min   Yes      Y    y STM      Stretching PROM flexion and extension  Joint mobs IT band massage disance    0 - 125    Gentle patellar mobs      THER ACT  CPT 49299 TOTAL TIME FOR SESSION 0 Minutes    Assessment for progress note 5/29/2024- WOMAC, 10 MWT, MMT, ROM     Reviewed HEP TID                                    Patient Education Discussed findings on evaluation including anatomy involved and POC moving forward.   Adjusted the height of hurry cane.  Patient educated in the importance of elevation of RLE above her heart ad to use ice 10 min at a time throughout the day. Also discussed sleeping positions for increased comfort. Scar massage education and demonstration 5/6/24 5/29/2024- Discussed findings on assessment and progress towards goals, POC moving forward. Patient verbalized understanding and agreement.

## 2024-06-24 ENCOUNTER — HOSPITAL ENCOUNTER (OUTPATIENT)
Dept: OCCUPATIONAL THERAPY | Facility: REHABILITATION | Age: 59
Setting detail: THERAPIES SERIES
Discharge: HOME | End: 2024-06-24
Attending: PHYSICIAN ASSISTANT
Payer: COMMERCIAL

## 2024-06-24 ENCOUNTER — HOSPITAL ENCOUNTER (OUTPATIENT)
Dept: CARDIOLOGY | Facility: CLINIC | Age: 59
Discharge: HOME | End: 2024-06-24
Attending: FAMILY MEDICINE
Payer: COMMERCIAL

## 2024-06-24 VITALS
DIASTOLIC BLOOD PRESSURE: 68 MMHG | HEIGHT: 66 IN | SYSTOLIC BLOOD PRESSURE: 122 MMHG | BODY MASS INDEX: 27 KG/M2 | WEIGHT: 168 LBS

## 2024-06-24 DIAGNOSIS — Z96.651 HISTORY OF TOTAL KNEE ARTHROPLASTY, RIGHT: Primary | ICD-10-CM

## 2024-06-24 DIAGNOSIS — R94.31 ABNORMAL EKG: ICD-10-CM

## 2024-06-24 DIAGNOSIS — R26.9 GAIT ABNORMALITY: ICD-10-CM

## 2024-06-24 LAB
AORTIC ROOT ANNULUS - M-MODE: 2.9 CM
BSA FOR ECHO PROCEDURE: 1.88 M2
E WAVE DECELERATION TIME: 264 MS
E/A RATIO: 0.7
E/E' RATIO: 7.9
E/LAT E' RATIO: 4.3
EDV (BP): 61.7 CM3
EF (A4C): 45.6 %
EF A2C: 66.6 %
EJECTION FRACTION: 58.2 %
ESV (BP): 25.8 CM3
FRACTIONAL SHORTENING: 25.34 %
INTERVENTRICULAR SEPTUM: 0.85 CM
LA ESV (BP): 55.9 CM3
LA ESV INDEX (A2C): 40.53 CM3/M2
LA ESV INDEX (BP): 29.73 CM3/M2
LA/AORTA RATIO: 0.97
LAAS-AP2: 21.4 CM2
LAAS-AP4: 16.2 CM2
LAD 2D - M-MODE: 2.8 CM
LALD A4C: 4.85 CM
LALD A4C: 4.95 CM
LAV-S: 76.2 CM3
LEFT ATRIUM VOLUME INDEX: 21.54 CM3/M2
LEFT ATRIUM VOLUME: 40.5 CM3
LEFT INTERNAL DIMENSION IN SYSTOLE: 2.77 CM (ref 2.6–3.93)
LEFT VENTRICLE DIASTOLIC VOLUME INDEX: 35.11 CM3/M2
LEFT VENTRICLE DIASTOLIC VOLUME: 66 CM3
LEFT VENTRICLE SYSTOLIC VOLUME INDEX: 19.1 CM3/M2
LEFT VENTRICLE SYSTOLIC VOLUME: 35.9 CM3
LEFT VENTRICULAR INTERNAL DIMENSION IN DIASTOLE: 3.71 CM (ref 4.39–6.1)
LEFT VENTRICULAR POSTERIOR WALL IN END DIASTOLE: 1 CM (ref 0.58–1.07)
LV DIASTOLIC VOLUME: 56.9 CM3
LV ESV (APICAL 2 CHAMBER): 19 CM3
LVAD-AP2: 21.6 CM2
LVAD-AP4: 23.1 CM2
LVAS-AP2: 10.9 CM2
LVAS-AP4: 15.4 CM2
LVEDVI(A2C): 30.27 CM3/M2
LVEDVI(BP): 32.82 CM3/M2
LVESVI(A2C): 10.11 CM3/M2
LVESVI(BP): 13.72 CM3/M2
LVLD-AP2: 6.75 CM
LVLD-AP4: 6.65 CM
LVLS-AP2: 5.52 CM
LVLS-AP4: 5.61 CM
MV E'TISSUE VEL-LAT: 0.12 M/S
MV E'TISSUE VEL-MED: 0.06 M/S
MV PEAK A VEL: 0.72 M/S
MV PEAK E VEL: 0.5 M/S
POSTERIOR WALL: 1 CM
SEPTAL TISSUE DOPPLER FREE WALL LATE DIA VELOCITY (APICAL 4 CHAMBER VIEW): 0.11 M/S
Z-SCORE OF LEFT VENTRICULAR DIMENSION IN END DIASTOLE: -3.33
Z-SCORE OF LEFT VENTRICULAR DIMENSION IN END SYSTOLE: -1.12
Z-SCORE OF LEFT VENTRICULAR POSTERIOR WALL IN END DIASTOLE: 1.26

## 2024-06-24 PROCEDURE — 97140 MANUAL THERAPY 1/> REGIONS: CPT | Mod: GP

## 2024-06-24 PROCEDURE — 97110 THERAPEUTIC EXERCISES: CPT | Mod: GP

## 2024-06-24 PROCEDURE — 93306 TTE W/DOPPLER COMPLETE: CPT | Mod: 26 | Performed by: INTERNAL MEDICINE

## 2024-06-24 PROCEDURE — 93306 TTE W/DOPPLER COMPLETE: CPT

## 2024-06-24 PROCEDURE — 97530 THERAPEUTIC ACTIVITIES: CPT | Mod: GP

## 2024-06-24 NOTE — PROGRESS NOTES
Physical Therapy Progress Note    PT PROGRESS NOTE FOR OUTPATIENT THERAPY    Patient: Renee Bustamante   MRN: [id number]  : 1965 58 y.o.    Referring Physician: Magnolia Felton PA C  Date of Visit: 2024    Certification Dates: 24 through 24    Recommended Frequency & Duration:  3 times/week for up to 3 months   2-3x/week    Diagnosis:   1. History of total knee arthroplasty, right    2. Gait abnormality        Chief Complaints:  Chief Complaint   Patient presents with    Decreased Endurance    Dec ROM    Pain    Dec Strength       Precautions:   Existing Precautions/Restrictions: no known precautions/restrictions    TODAY'S VISIT:    Time In Session:  Start Time: 1000  Stop Time: 1100  Time Calculation (min): 60 min   General Information - 24 1006          Session Details    Document Type daily treatment        General Information    Onset of Illness/Injury or Date of Surgery 24     Referring Physician Magnolia TSE     History of present illness/functional impairment This 58 year old patient is s/p R TKA on 2024 with  at [facility]. She states she tore the meniscus in her R knee while attending barre class in 2023 which was confirmed by MRI in . She had knee pain x 3 years prior to that and had tried cortisone shots ad synvisc No post op complications were reported and she went home the next day. She now presents to OPPT at [facility].     Patient/Family/Caregiver Comments/Observations Tiffanie reports no knee pain on arrival and feeling well.She reports some swelling and stiffess in R knee after doing alot of cooking yesterday qand going to a party.     Existing Precautions/Restrictions no known precautions/restrictions                      Pain/Vitals - 24 1006          Pain Assessment    Currently in pain No/Denies     Preferred Pain Scale number (Numeric Rating Pain Scale)     Pain Side/Orientation right     Pain: Body location  Knee     Pain Rating (0-10): Pre Activity 0     Pain Rating (0-10): Activity 4     Pain Rating (0-10): Post Activity 1        Pre Activity Vital Signs    Pulse 85     /62     BP Location Left upper arm     BP Method Automatic     Patient Position Sitting        Pain Intervention    Intervention  MT, TE, NMRE     Post Intervention Comments pain 1/10 at end of session                    PT - 06/24/24 1006          Physical Therapy    Physical Therapy Specialty [facility] [employer] [facility] PT        PT Plan    Frequency of treatment 3 times/week     PT Duration 3 months     PT Custom Frequency and Duration 2-3x/week     PT Cert From 04/24/24     PT Cert To 07/23/24     Date PT POC was sent to provider 04/24/24     Signed PT Plan of Care received?  Yes                    Assessment and Plan - 06/24/24 1015          Assessment    Clinical Assessment Tiffanie Bustamante was seen for her second 30 day progress note today. She had no pain on arrival with pain 1/10 at the end of the hour. Her PROM has improved to 0-128 * R knee and her AROM is 0-122*. Her strength has improved by 1/2 grade throughout the RLE and her standing tolerance has improved from 10 minutes to 15 minutes. Her WOMAC score was relatively unchanged at 32%.She will benefit from ongoing OPPT to address her remaining deficits and improve her balance, gait and overall functional mobility.     Plan and Recommendations Continue to progress ROM and strength as tolerated and progress higher level balance activities as able.                     OBJECTIVE MEASUREMENTS/DATA:    ROM and MMT          4/24/2024 5/29/2024 6/24/2024   PT LE ROM Measurements   AROM: Right LE Gross Movement WNL       R hip     AROM: Left LE Gross Movement WNL     PROM: Right Knee Flexion  125 128   AROM: Right Knee Flexion 89 118 122   AROM: Left Knee Flexion 14 140    PROM: Right Knee Extension  0 0   AROM: Right Knee Extension -7 -4 0   AROM: Left Knee Extension 0 0    PROM: Right Ankle DF  12  degrees 13 degrees   AROM: Right Ankle DF 5 degrees 10 degrees 10 degrees   AROM: Left Ankle DF 10 degrees 10 degrees    PT Cervical/Lumbar/Other ROM Measurements   Right Knee Girth measurement Mid patella= 41 cm; 15 cm up= 46 cm; 15 cm down= 37 cm     Left Knee Girth measurement Mid patella 39 cm, 15 cm up= 46 cm; 15 cm down= 39 cm     PT LE MMT   Right Hip Flexion (4/5) good (4+/5) good plus (4+/5) good plus   Left Hip Flexion (5/5) normal (5/5) normal    Right Hip Extension (4/5) good (4+/5) good plus (4+/5) good plus   Left Hip Extension (4+/5) good plus (5/5) normal    Right Hip ABD (4-/5) good minus (4/5) good (4+/5) good plus   Left Hip ABD (4+/5) good plus (5/5) normal    Right Hip ADD (4/5) good (4/5) good (4+/5) good plus   Left Hip ADD (5/5) normal     Right Hip IR  (4/5) good (4+/5) good plus   Right Hip ER (4-/5) good minus (4/5) good (4+/5) good plus   Left Hip ER (4+/5) good plus (5/5) normal    Right Knee Flexion (4-/5) good minus (4/5) good (4/5) good   Left Knee Flexion (5/5) normal (5/5) normal    Right Knee Extension (4-/5) good minus (4/5) good (4+/5) good plus   Left Knee Extension (5/5) normal (5/5) normal    Right Ankle DF (4+/5) good plus (4+/5) good plus (5/5) normal   Left Ankle DF (5/5) normal (5/5) normal    Right Ankle PF (4/5) good (4/5) good (4/5) good   Left Ankle PF (5/5) normal (5/5) normal      Outcome Measures          4/24/2024    10:00 5/29/2024    09:00 6/24/2024    10:15   PT OBJECTIVE Outcome Measures   10 Meter Walk Test 11.5 meters/sec 7.08 meters/sec    Gait Speed (m/sec) 0.87 m/sec       with Hurrycane 1.41 m/sec    PT SUBJECTIVE Outcome Measures   Other WOMAC score= 46/96= 48%; Sitting tolerance unlimited, standing tolerance 10 minutes; walking tolerance 10-15 miutes WOMAC= 29/96= 30%; Standing tolerance 10 min; walking tolerance= 15 min WOMAC= 31/96= 32%; Standing tolerance 15 min; walking tolerance 15 min       Today's Treatment::    Education provided:  Yes: See  "treatment log for details of education provided    PT FLOWSHEET  Renee Yakov Elena  \"Soumya\"    s/p R TKA  Performed PT Exercises Current Session Time    HOT PACK/COLD PACK  CPT 69587   TOTAL TIME FOR SESSION Not performed    Ice Ice R knee 10 min at end of session non billed    Heat     THER EX  CPT 20019 TOTAL TIME FOR SESSION   35 Minutes    Updated HEP     MOBILIZING    y Heel slides 10 with b/l legs on ball and strap    Term knee ext    y Hamstring stretch 3 x 30 sec longsitting    IT band stretch     Gastroc stretch on board 3 x 30 sec   y Prone quad stretch 4 x 20 sec    Modified Harshil stretch     STRENGTHENING          Ankle pumps     SLR with quad set on hands 2x10 with 2 # weight    clamshells 2 x 10 orange band    Short  arc quad 20    Single leg bridging 10x2  cues to butt squeeze    Seated LAQ 10 manual resistence conn    Prone hip extensions 10x2    Prone knee flexion 10x with OTB con/ecc    90-90's 10x2 orange band    Hamstring curls on large blue ball 2x10    Hip abduction in sidelying 10x2 orange band    Terminal knee extension with T band 10x 2 green band    Sidelying donkey Kick 2x10    Hip abduction at wall  Press L leg into ball standing on R x10   y Standing:   HR/TR     Single leg heel raises x 20, b/l toe raises x20   Y    Y  y Forward step up repeaters     Lateral step up  Step down, heel touch only 8 inch 2 x 10  repeaters with 8 inch to 12 inch on side  8 inch 1 x 10 B with opp abd  6 inch 2 x 10 no RUE support   y Squats  2x10 on Airex   y Lunges Alternate lunges onto BOSU between bars with occasional UE A                             Sidestepping See below    Single leg stance with taps in front and back (slight stance knee flexion) 20    Backwards walking Yellow power cord 20 feet x4    4 way kicks in SLS 1 x 10 B with OTB    CARDIOVASCULAR     y Recumbent bike x 10 min Seat 9 level 3-4    Treadmill     NEURO RE-ED  CPT 86553 TOTAL TIME FOR SESSION 0 min      Static balance 30 sec " floor, 30 sec foam  SLS with ball toss on floor 1 x 10 , then on foam 2 x 10    Resisted walking with yellow power cord Fwd and bkwd 100 feet each moderate amount of tension on yellow cord    Dynamic balance High marching 30 feet x 2  Sidestepping 25 feet x 4 with BTB    GAIT TRAINING  CPT  52281  0 min    Working on heel strike and pushoff       stairs Ascend reciprocal with rail, descend step to with rail     MANUAL  CPT 68501 TOTAL TIME FOR SESSION 15 min   Yes    Y    y STM    Stretching PROM flexion and extension  Joint mobs IT band, quads and hamstrings    0 - 125    Patellar mobs; Tibio-femural posterior glides       THER ACT  CPT 64324 TOTAL TIME FOR SESSION 10 Minutes    Assessment for progress note 6/24/2024- WOMAC, MMT, ROM     Reviewed HEP TID                                    Patient Education Discussed findings on evaluation including anatomy involved and POC moving forward.   Adjusted the height of hurry cane.  Patient educated in the importance of elevation of RLE above her heart ad to use ice 10 min at a time throughout the day. Also discussed sleeping positions for increased comfort. Scar massage education and demonstration 5/6/24 6/24/2024- Discussed findings on assessment and progress towards goals, POC moving forward. Patient verbalized understanding and agreement.           Goals Addressed                   This Visit's Progress     PT RW STG and LTG        Short Term Goal Time Frame Achieved Comment   Pt will increase R knee PROM by >/= 15 degrees 4 weeks met    Pt will increase R knee AROM by >/= 10 degrees for improved functional motion with stair negotiation 4 weeks  met     Pt will increase RLE MMT by 1/2 grade or more for improved functional strength with weight bearing activities 4 weeks  met     Pt will score 35% or better on the WOMAC for improved tolerance in daily functional ambulation and stair negotiation 4 weeks  met  5/29= 30%   Pt will be supervision with HEP 4 weeks met    Pt  will decrease pain of R knee to </ 3/10 at worst for improved daily tolerance with weight bearing activities 4 weeks met         Long Term Goal Time Frame Achieved Comment   Pt will demonstrate R knee AROM to WNL all planes to allow improved motion with daily functional ambulation, stair negotiation, squatting 8 -12 weeks  ongoing   6/24= 0-128* passively, 0-122* actively   Pt will demonstrate 5/5 MMT of RLE  to improve functional strength with daily weight bearing activities 8-12 weeks  ongoing  4+/5   Pt will score 20% or better on WOMAC for improved functional tolerance with ambulation and stair negotiation  8-12weeks  6/24= 32%     Pt will perform walking outdoors and return too gym classes without limitation  8-12weeks ongoing    Pt will be independent with HEP  8-12weeks ongoing    Patient will ambulate on all surfaces without AD with normalized  gait pattern and gait speed >/= 1.5 m/sec 8-12 weeks ongoing    Pt will demonstrate </=2/10 R knee pain to improve tolerance with weight bearing activities 8 -12weeks ongoing                   Xochitl Rucker, PT

## 2024-06-24 NOTE — OP PT TREATMENT LOG
"PT FLOWSHEET  Renee  Cabe  \"Soumya\"    s/p R TKA  Performed PT Exercises Current Session Time    HOT PACK/COLD PACK  CPT 19504   TOTAL TIME FOR SESSION Not performed    Ice Ice R knee 10 min at end of session non billed    Heat     THER EX  CPT 25772 TOTAL TIME FOR SESSION   35 Minutes    Updated HEP     MOBILIZING    y Heel slides 10 with b/l legs on ball and strap    Term knee ext    y Hamstring stretch 3 x 30 sec longsitting    IT band stretch     Gastroc stretch on board 3 x 30 sec   y Prone quad stretch 4 x 20 sec    Modified Harshil stretch     STRENGTHENING          Ankle pumps     SLR with quad set on hands 2x10 with 2 # weight    clamshells 2 x 10 orange band    Short  arc quad 20    Single leg bridging 10x2  cues to butt squeeze    Seated LAQ 10 manual resistence conn    Prone hip extensions 10x2    Prone knee flexion 10x with OTB con/ecc    90-90's 10x2 orange band    Hamstring curls on large blue ball 2x10    Hip abduction in sidelying 10x2 orange band    Terminal knee extension with T band 10x 2 green band    Sidelying donkey Kick 2x10    Hip abduction at wall  Press L leg into ball standing on R x10   y Standing:   HR/TR     Single leg heel raises x 20, b/l toe raises x20   Y    Y  y Forward step up repeaters     Lateral step up  Step down, heel touch only 8 inch 2 x 10  repeaters with 8 inch to 12 inch on side  8 inch 1 x 10 B with opp abd  6 inch 2 x 10 no RUE support   y Squats  2x10 on Airex   y Lunges Alternate lunges onto BOSU between bars with occasional UE A                             Sidestepping See below    Single leg stance with taps in front and back (slight stance knee flexion) 20    Backwards walking Yellow power cord 20 feet x4    4 way kicks in SLS 1 x 10 B with OTB    CARDIOVASCULAR     y Recumbent bike x 10 min Seat 9 level 3-4    Treadmill     NEURO RE-ED  CPT 37364 TOTAL TIME FOR SESSION 0 min      Static balance 30 sec floor, 30 sec foam  SLS with ball toss on floor 1 x 10 " , then on foam 2 x 10    Resisted walking with yellow power cord Fwd and bkwd 100 feet each moderate amount of tension on yellow cord    Dynamic balance High marching 30 feet x 2  Sidestepping 25 feet x 4 with BTB    GAIT TRAINING  CPT  65537  0 min    Working on heel strike and pushoff       stairs Ascend reciprocal with rail, descend step to with rail     MANUAL  CPT 10362 TOTAL TIME FOR SESSION 15 min   Yes    Y    y STM    Stretching PROM flexion and extension  Joint mobs IT band, quads and hamstrings    0 - 125    Patellar mobs; Tibio-femural posterior glides       THER ACT  CPT 98638 TOTAL TIME FOR SESSION 10 Minutes    Assessment for progress note 6/24/2024- WOMAC, MMT, ROM     Reviewed HEP TID                                    Patient Education Discussed findings on evaluation including anatomy involved and POC moving forward.   Adjusted the height of hurry cane.  Patient educated in the importance of elevation of RLE above her heart ad to use ice 10 min at a time throughout the day. Also discussed sleeping positions for increased comfort. Scar massage education and demonstration 5/6/24 6/24/2024- Discussed findings on assessment and progress towards goals, POC moving forward. Patient verbalized understanding and agreement.

## 2024-07-01 ENCOUNTER — TRANSCRIBE ORDERS (OUTPATIENT)
Dept: SCHEDULING | Age: 59
End: 2024-07-01

## 2024-07-01 ENCOUNTER — HOSPITAL ENCOUNTER (OUTPATIENT)
Dept: OCCUPATIONAL THERAPY | Facility: REHABILITATION | Age: 59
Setting detail: THERAPIES SERIES
Discharge: HOME | End: 2024-07-01
Attending: PHYSICIAN ASSISTANT
Payer: COMMERCIAL

## 2024-07-01 DIAGNOSIS — N93.8 OTHER SPECIFIED ABNORMAL UTERINE AND VAGINAL BLEEDING: Primary | ICD-10-CM

## 2024-07-01 DIAGNOSIS — R26.9 GAIT ABNORMALITY: ICD-10-CM

## 2024-07-01 DIAGNOSIS — Z96.651 HISTORY OF TOTAL KNEE ARTHROPLASTY, RIGHT: Primary | ICD-10-CM

## 2024-07-01 PROCEDURE — 97110 THERAPEUTIC EXERCISES: CPT | Mod: GP

## 2024-07-01 PROCEDURE — 97140 MANUAL THERAPY 1/> REGIONS: CPT | Mod: GP

## 2024-07-01 PROCEDURE — 97112 NEUROMUSCULAR REEDUCATION: CPT | Mod: GP

## 2024-07-01 NOTE — OP PT TREATMENT LOG
"PT FLOWSHEET  Renee  Cabe  \"Soumya\"    s/p R TKA  Performed PT Exercises Current Session Time    HOT PACK/COLD PACK  CPT 35707   TOTAL TIME FOR SESSION Not performed    Ice Ice R knee 10 min at end of session non billed    Heat     THER EX  CPT 92110 TOTAL TIME FOR SESSION   40 Minutes    Updated HEP     MOBILIZING     Heel slides 10 with b/l legs on ball and strap    Term knee ext    y Hamstring stretch 3 x 30 sec longsitting    IT band stretch    y Gastroc stretch on board 3 x 30 sec   y Prone quad stretch 4 x 20 sec    Modified Harshil stretch     STRENGTHENING          Ankle pumps     SLR with quad set on hands 2x10 with 2 # weight    clamshells 2 x 10 orange band    Short  arc quad 20    Single leg bridging 10x2  cues to butt squeeze    Seated LAQ 10 manual resistence conn    Prone hip extensions 10x2    Prone knee flexion 10x with OTB con/ecc    90-90's 10x2 orange band    Hamstring curls on large blue ball 2x10    Hip abduction in sidelying 10x2 orange band   y Terminal knee extension with T band 10x 2 blue t-band   y TKE with BTB with opp march 1 x 10    Sidelying donkey Kick 2x10    Hip abduction at wall  Press L leg into ball standing on R x10   y Standing:   HR/TR     Single leg heel raises x 20, b/l toe raises x20   Y  Y  y Forward step up repeaters   Lateral step up  Step down, heel touch only 8 inch 2 x 10  repeaters with opp march  8 inch 1 x 10 B with opp abd  6 inch 2 x 10 no RUE support   y Squats  2x10 on Airex   y Lunges Alternate lunges 1 x 10 between TM bars                             Sidestepping See below    Single leg stance with taps in front and back (slight stance knee flexion) 20    Backwards walking Yellow power cord 20 feet x4   y 4 way kicks in SLS 1 x 10 B with PTB    CARDIOVASCULAR     y Recumbent bike x 10 min Seat 9 level 5    Treadmill     NEURO RE-ED  CPT 04530 TOTAL TIME FOR SESSION 10 min      Static balance 30 sec floor, 30 sec foam  SLS with ball toss on floor 1 x 10 " , then on foam 2 x 10    Resisted walking with yellow power cord Fwd and bkwd 100 feet each moderate amount of tension on yellow cord   y Dynamic balance High marching 30 feet x 2 with chest press with ball  Sidestepping 25 feet x 4 with BTB  Tandem walking 25 feet x 2    GAIT TRAINING  CPT  59001  0 min    Working on heel strike and pushoff       stairs Ascend reciprocal with rail, descend step to with rail     MANUAL  CPT 54753 TOTAL TIME FOR SESSION 10 min   Yes        y STM    Stretching PROM flexion and extension  Joint mobs IT band, quads and hamstrings    0 - 125    Patellar mobs; Tibio-femural posterior glides       THER ACT  CPT 78013 TOTAL TIME FOR SESSION Not performed    Assessment for progress note 6/24/2024- WOMAC, MMT, ROM     Reviewed HEP TID                                    Patient Education Discussed findings on evaluation including anatomy involved and POC moving forward.   Adjusted the height of hurry cane.  Patient educated in the importance of elevation of RLE above her heart ad to use ice 10 min at a time throughout the day. Also discussed sleeping positions for increased comfort. Scar massage education and demonstration 5/6/24 6/24/2024- Discussed findings on assessment and progress towards goals, POC moving forward. Patient verbalized understanding and agreement.

## 2024-07-01 NOTE — PROGRESS NOTES
Physical Therapy Visit    PT DAILY NOTE FOR OUTPATIENT THERAPY    Patient: Renee Bustamante MRN: [id number]  : 1965 58 y.o.  Referring Physician: Magnolia Felton PA C  Date of Visit: 2024    Certification Dates: 24 through 24    Diagnosis:   1. History of total knee arthroplasty, right    2. Gait abnormality        Chief Complaints:  knee pain, gait dysfunction, decreaaed ROM    Precautions:   Existing Precautions/Restrictions: no known precautions/restrictions      TODAY'S VISIT    Time In Session:  Start Time: 1000  Stop Time: 1100  Time Calculation (min): 60 min   History/Vitals/Pain/Encounter Info - 24 1003          Injury History/Precautions/Daily Required Info    Document Type daily treatment     Primary Therapist Xochitl Woods     Chief Complaint/Reason for Visit  knee pain, gait dysfunction, decreaaed ROM     Onset of Illness/Injury or Date of Surgery 24     Referring Physician Magnolia TSE     Existing Precautions/Restrictions no known precautions/restrictions     History of present illness/functional impairment This 58 year old patient is s/p R TKA on 2024 with  at [facility]. She states she tore the meniscus in her R knee while attending barre class in 2023 which was confirmed by MRI in . She had knee pain x 3 years prior to that and had tried cortisone shots ad synvisc No post op complications were reported and she went home the next day. She now presents to OPPT at [facility].     Patient/Family/Caregiver Comments/Observations Tiffanie reports she went to a wedding on Friday and danced!. She had some pain and edema the next day, but no pain today after icing and elevating over the weekend. She saw MD last Wednesday and has been released from his care.     Patient reported fall since last visit No        Pain Assessment    Currently in pain No/Denies     Pain Rating (0-10): Pre Activity 0     Pain Rating (0-10): Post Activity 0   "      Pain Intervention    Intervention  MT, TE, NMRE     Post Intervention Comments no pain t/o session                    Daily Treatment Assessment and Plan - 07/01/24 1003          Daily Treatment Assessment and Plan    Progress toward goals Progressing     Daily Outcome Summary Began with active warm up on recumbent bike with increased resistance followed by manual treatment and stretching. Performed 4 way tband kicks with increase to pink tband. Soumya demonstrated increased stability R knee with step up repeaters. Her dynamic balance is improving as well with addition of tandem walking and chest presses with marching. She tolerated session very well with no pain at the end of the hour.     Plan and Recommendations Continue to progress ROM and strength as tolerated and progress higher level balance activities as able.                       Today's Treatment:    PT FLOWSHEET  Renee Valentine  \"Soumya\"    s/p R TKA  Performed PT Exercises Current Session Time    HOT PACK/COLD PACK  CPT 96572   TOTAL TIME FOR SESSION Not performed    Ice Ice R knee 10 min at end of session non billed    Heat     THER EX  CPT 66046 TOTAL TIME FOR SESSION   40 Minutes    Updated HEP     MOBILIZING     Heel slides 10 with b/l legs on ball and strap    Term knee ext    y Hamstring stretch 3 x 30 sec longsitting    IT band stretch    y Gastroc stretch on board 3 x 30 sec   y Prone quad stretch 4 x 20 sec    Modified Harshil stretch     STRENGTHENING          Ankle pumps     SLR with quad set on hands 2x10 with 2 # weight    clamshells 2 x 10 orange band    Short  arc quad 20    Single leg bridging 10x2  cues to butt squeeze    Seated LAQ 10 manual resistence conn    Prone hip extensions 10x2    Prone knee flexion 10x with OTB con/ecc    90-90's 10x2 orange band    Hamstring curls on large blue ball 2x10    Hip abduction in sidelying 10x2 orange band   y Terminal knee extension with T band 10x 2 blue t-band   y TKE with BTB with opp " march 1 x 10    Sidelying donkey Kick 2x10    Hip abduction at wall  Press L leg into ball standing on R x10   y Standing:   HR/TR     Single leg heel raises x 20, b/l toe raises x20   Y  Y  y Forward step up repeaters   Lateral step up  Step down, heel touch only 8 inch 2 x 10  repeaters with opp march  8 inch 1 x 10 B with opp abd  6 inch 2 x 10 no RUE support   y Squats  2x10 on Airex   y Lunges Alternate lunges 1 x 10 between TM bars                             Sidestepping See below    Single leg stance with taps in front and back (slight stance knee flexion) 20    Backwards walking Yellow power cord 20 feet x4   y 4 way kicks in SLS 1 x 10 B with PTB    CARDIOVASCULAR     y Recumbent bike x 10 min Seat 9 level 5    Treadmill     NEURO RE-ED  CPT 11334 TOTAL TIME FOR SESSION 10 min      Static balance 30 sec floor, 30 sec foam  SLS with ball toss on floor 1 x 10 , then on foam 2 x 10    Resisted walking with yellow power cord Fwd and bkwd 100 feet each moderate amount of tension on yellow cord   y Dynamic balance High marching 30 feet x 2 with chest press with ball  Sidestepping 25 feet x 4 with BTB  Tandem walking 25 feet x 2    GAIT TRAINING  CPT  12491  0 min    Working on heel strike and pushoff       stairs Ascend reciprocal with rail, descend step to with rail     MANUAL  CPT 70750 TOTAL TIME FOR SESSION 10 min   Yes        y STM    Stretching PROM flexion and extension  Joint mobs IT band, quads and hamstrings    0 - 125    Patellar mobs; Tibio-femural posterior glides       THER ACT  CPT 70535 TOTAL TIME FOR SESSION Not performed    Assessment for progress note 6/24/2024- WOMAC, MMT, ROM     Reviewed HEP TID                                    Patient Education Discussed findings on evaluation including anatomy involved and POC moving forward.   Adjusted the height of hurry cane.  Patient educated in the importance of elevation of RLE above her heart ad to use ice 10 min at a time throughout the day.  Also discussed sleeping positions for increased comfort. Scar massage education and demonstration 5/6/24 6/24/2024- Discussed findings on assessment and progress towards goals, POC moving forward. Patient verbalized understanding and agreement.

## 2024-07-02 ENCOUNTER — HOSPITAL ENCOUNTER (OUTPATIENT)
Dept: RADIOLOGY | Facility: HOSPITAL | Age: 59
Discharge: HOME | End: 2024-07-02
Attending: SPECIALIST
Payer: COMMERCIAL

## 2024-07-02 DIAGNOSIS — N93.8 OTHER SPECIFIED ABNORMAL UTERINE AND VAGINAL BLEEDING: ICD-10-CM

## 2024-07-02 PROCEDURE — 76830 TRANSVAGINAL US NON-OB: CPT

## 2024-07-15 ENCOUNTER — HOSPITAL ENCOUNTER (OUTPATIENT)
Dept: OCCUPATIONAL THERAPY | Facility: REHABILITATION | Age: 59
Setting detail: THERAPIES SERIES
Discharge: HOME | End: 2024-07-15
Attending: PHYSICIAN ASSISTANT
Payer: COMMERCIAL

## 2024-07-15 DIAGNOSIS — R26.9 GAIT ABNORMALITY: ICD-10-CM

## 2024-07-15 DIAGNOSIS — Z96.651 HISTORY OF TOTAL KNEE ARTHROPLASTY, RIGHT: Primary | ICD-10-CM

## 2024-07-15 PROCEDURE — 97112 NEUROMUSCULAR REEDUCATION: CPT | Mod: GP

## 2024-07-15 PROCEDURE — 97110 THERAPEUTIC EXERCISES: CPT | Mod: GP

## 2024-07-15 PROCEDURE — 97140 MANUAL THERAPY 1/> REGIONS: CPT | Mod: GP

## 2024-07-15 NOTE — PROGRESS NOTES
Physical Therapy Visit    PT DAILY NOTE FOR OUTPATIENT THERAPY    Patient: Renee Bustamante MRN: [id number]  : 1965 58 y.o.  Referring Physician: Magnolia Felton PA C  Date of Visit: 7/15/2024    Certification Dates: 24 through 24    Diagnosis:   1. History of total knee arthroplasty, right    2. Gait abnormality        Chief Complaints:  knee pain, gait dysfunction, decreaaed ROM    Precautions:   Existing Precautions/Restrictions: no known precautions/restrictions      TODAY'S VISIT    Time In Session:  Start Time: 0900  Stop Time: 1000  Time Calculation (min): 60 min   History/Vitals/Pain/Encounter Info - 07/15/24 0906          Injury History/Precautions/Daily Required Info    Document Type daily treatment     Primary Therapist Xochitl Woods     Chief Complaint/Reason for Visit  knee pain, gait dysfunction, decreaaed ROM     Onset of Illness/Injury or Date of Surgery 24     Referring Physician Magnolia TSE     Existing Precautions/Restrictions no known precautions/restrictions     History of present illness/functional impairment This 58 year old patient is s/p R TKA on 2024 with  at [facility]. She states she tore the meniscus in her R knee while attending barre class in 2023 which was confirmed by MRI in . She had knee pain x 3 years prior to that and had tried cortisone shots ad synvisc No post op complications were reported and she went home the next day. She now presents to OPPT at [facility].     Patient/Family/Caregiver Comments/Observations Tiffanie returns after being on vacation at the beach, stating she walked a mile and spent most of her time at the pool. No pain on arrival, but she repors continued swelling behind her knee.     Patient reported fall since last visit No        Pain Assessment    Currently in pain No/Denies        Pain Intervention    Intervention  MH, TE, NMRE,MT     Post Intervention Comments no pain reported t/o  "session                    Daily Treatment Assessment and Plan - 07/15/24 1028          Daily Treatment Assessment and Plan    Progress toward goals Progressing     Daily Outcome Summary Soumya returns from vacation and resumed treatment beginning with recumbent bike followed by manual treatment and TE  with increased reps for tband 4 way kicks and progression to 8 inch step for step downs. Her AROM is 3-125 and PROM is 0-128. She continues to avoid full TKE in stance phase of gait even though it can be achieved passively with stretching. She will benefit from continuing PT with focus on strength and stability R knee and gait training with focus on TKE.     Plan and Recommendations Re-eval n/v with possible extension 2-4 more weeks.                     Today's Treatment:    PT FLOWSHEET  Renee Valentine  \"Soumya\"    s/p R TKA  Performed PT Exercises Current Session Time    HOT PACK/COLD PACK  CPT 83679   TOTAL TIME FOR SESSION Not performed    Ice Ice R knee 10 min at end of session non billed    Heat     THER EX  CPT 68487 TOTAL TIME FOR SESSION   40 Minutes    Updated HEP     MOBILIZING    y Heel slides 10 with b/l legs on ball and strap    Term knee ext    y Hamstring stretch 3 x 30 sec longsitting    IT band stretch    y Gastroc stretch on board 3 x 30 sec   y Prone quad stretch 4 x 20 sec    Modified Harshil stretch     STRENGTHENING          Ankle pumps     SLR with quad set on hands 2x10 with 2 # weight    clamshells 2 x 10 orange band    Short  arc quad 20    Single leg bridging 10x2  cues to butt squeeze    Seated LAQ 10 manual resistence conn    Prone hip extensions 10x2    Prone knee flexion 10x with OTB con/ecc    90-90's 10x2 orange band    Hamstring curls on large blue ball 2x10    Hip abduction in sidelying 10x2 orange band   y Terminal knee extension with T band 10x 2 blue t-band   y TKE with BTB with opp march 1 x 10    Sidelying donkey Kick 2x10    Hip abduction at wall  Press L leg into ball " standing on R x10   y Standing:   HR/TR     Single leg heel raises x 20, b/l toe raises x20   Y  Y  y Forward step up repeaters   Lateral step up  Step down, heel touch only 8 inch 2 x 10  repeaters with opp march  8 inch 2 x 10 B with opp abd  8 inch 2 x 10 no RUE support   y Squats  2x10 on Airex   y Lunges Alternate lunges 1 x 10 each between TM bars                             Sidestepping See below    Single leg stance with taps in front and back (slight stance knee flexion) 20    Backwards walking Yellow power cord 20 feet x4   y 4 way kicks in SLS 2 x 10 B with PTB    CARDIOVASCULAR     y Recumbent bike x 10 min Seat 9 level 5    Treadmill     NEURO RE-ED  CPT 03353 TOTAL TIME FOR SESSION 10 min      Static balance 30 sec floor, 30 sec foam  SLS with ball toss on floor 1 x 10 , then on foam 2 x 10    Resisted walking with yellow power cord Fwd and bkwd 100 feet each moderate amount of tension on yellow cord   y Dynamic balance High marching 30 feet x 2 with chest press with ball  Sidestepping 25 feet x 4 with BTB  Tandem walking 25 feet x 2    GAIT TRAINING  CPT  00190  0 min    Working on heel strike and pushoff       stairs Ascend reciprocal with rail, descend step to with rail     MANUAL  CPT 93845 TOTAL TIME FOR SESSION 10 min   Yes        y STM    Stretching PROM flexion and extension  Joint mobs IT band, quads and hamstrings    0 - 128    Patellar mobs; Tibio-femural posterior glides       THER ACT  CPT 98247 TOTAL TIME FOR SESSION Not performed    Assessment for progress note 6/24/2024- WOMAC, MMT, ROM     Reviewed HEP TID                                    Patient Education Discussed findings on evaluation including anatomy involved and POC moving forward.   Adjusted the height of hurry cane.  Patient educated in the importance of elevation of RLE above her heart ad to use ice 10 min at a time throughout the day. Also discussed sleeping positions for increased comfort. Scar massage education and  demonstration 5/6/24 6/24/2024- Discussed findings on assessment and progress towards goals, POC moving forward. Patient verbalized understanding and agreement.

## 2024-07-15 NOTE — OP PT TREATMENT LOG
"PT FLOWSHEET  Renee Nagy Cabe  \"Soumya\"    s/p R TKA  Performed PT Exercises Current Session Time    HOT PACK/COLD PACK  CPT 01394   TOTAL TIME FOR SESSION Not performed    Ice Ice R knee 10 min at end of session non billed    Heat     THER EX  CPT 23211 TOTAL TIME FOR SESSION   40 Minutes    Updated HEP     MOBILIZING    y Heel slides 10 with b/l legs on ball and strap    Term knee ext    y Hamstring stretch 3 x 30 sec longsitting    IT band stretch    y Gastroc stretch on board 3 x 30 sec   y Prone quad stretch 4 x 20 sec    Modified Harshil stretch     STRENGTHENING          Ankle pumps     SLR with quad set on hands 2x10 with 2 # weight    clamshells 2 x 10 orange band    Short  arc quad 20    Single leg bridging 10x2  cues to butt squeeze    Seated LAQ 10 manual resistence conn    Prone hip extensions 10x2    Prone knee flexion 10x with OTB con/ecc    90-90's 10x2 orange band    Hamstring curls on large blue ball 2x10    Hip abduction in sidelying 10x2 orange band   y Terminal knee extension with T band 10x 2 blue t-band   y TKE with BTB with opp march 1 x 10    Sidelying donkey Kick 2x10    Hip abduction at wall  Press L leg into ball standing on R x10   y Standing:   HR/TR     Single leg heel raises x 20, b/l toe raises x20   Y  Y  y Forward step up repeaters   Lateral step up  Step down, heel touch only 8 inch 2 x 10  repeaters with opp march  8 inch 2 x 10 B with opp abd  8 inch 2 x 10 no RUE support   y Squats  2x10 on Airex   y Lunges Alternate lunges 1 x 10 each between TM bars                             Sidestepping See below    Single leg stance with taps in front and back (slight stance knee flexion) 20    Backwards walking Yellow power cord 20 feet x4   y 4 way kicks in SLS 2 x 10 B with PTB    CARDIOVASCULAR     y Recumbent bike x 10 min Seat 9 level 5    Treadmill     NEURO RE-ED  CPT 28118 TOTAL TIME FOR SESSION 10 min      Static balance 30 sec floor, 30 sec foam  SLS with ball toss on floor 1 " x 10 , then on foam 2 x 10    Resisted walking with yellow power cord Fwd and bkwd 100 feet each moderate amount of tension on yellow cord   y Dynamic balance High marching 30 feet x 2 with chest press with ball  Sidestepping 25 feet x 4 with BTB  Tandem walking 25 feet x 2    GAIT TRAINING  CPT  34477  0 min    Working on heel strike and pushoff       stairs Ascend reciprocal with rail, descend step to with rail     MANUAL  CPT 14592 TOTAL TIME FOR SESSION 10 min   Yes        y STM    Stretching PROM flexion and extension  Joint mobs IT band, quads and hamstrings    0 - 128    Patellar mobs; Tibio-femural posterior glides       THER ACT  CPT 38889 TOTAL TIME FOR SESSION Not performed    Assessment for progress note 6/24/2024- WOMAC, MMT, ROM     Reviewed HEP TID                                    Patient Education Discussed findings on evaluation including anatomy involved and POC moving forward.   Adjusted the height of hurry cane.  Patient educated in the importance of elevation of RLE above her heart ad to use ice 10 min at a time throughout the day. Also discussed sleeping positions for increased comfort. Scar massage education and demonstration 5/6/24 6/24/2024- Discussed findings on assessment and progress towards goals, POC moving forward. Patient verbalized understanding and agreement.

## 2024-07-17 ENCOUNTER — HOSPITAL ENCOUNTER (OUTPATIENT)
Dept: OCCUPATIONAL THERAPY | Facility: REHABILITATION | Age: 59
Setting detail: THERAPIES SERIES
Discharge: HOME | End: 2024-07-17
Attending: PHYSICIAN ASSISTANT
Payer: COMMERCIAL

## 2024-07-17 DIAGNOSIS — Z96.651 HISTORY OF TOTAL KNEE ARTHROPLASTY, RIGHT: Primary | ICD-10-CM

## 2024-07-17 DIAGNOSIS — R26.9 GAIT ABNORMALITY: ICD-10-CM

## 2024-07-17 PROCEDURE — 97110 THERAPEUTIC EXERCISES: CPT | Mod: GP

## 2024-07-17 PROCEDURE — 97140 MANUAL THERAPY 1/> REGIONS: CPT | Mod: GP

## 2024-07-17 PROCEDURE — 97530 THERAPEUTIC ACTIVITIES: CPT | Mod: GP

## 2024-07-17 NOTE — OP PT TREATMENT LOG
"PT FLOWSHEET  Renee  Cabe  \"Soumya\"    s/p R TKA  Performed PT Exercises Current Session Time    HOT PACK/COLD PACK  CPT 37660   TOTAL TIME FOR SESSION Not performed    Ice Ice R knee 10 min at end of session non billed    Heat     THER EX  CPT 43196 TOTAL TIME FOR SESSION   25 Minutes    Updated HEP     MOBILIZING    y Heel slides 10 with strap    Term knee ext    y Hamstring stretch 3 x 30 sec longsitting    IT band stretch    y Gastroc stretch on board 3 x 30 sec   y Prone quad stretch 4 x 20 sec    Modified Harshil stretch     STRENGTHENING          Ankle pumps     SLR with quad set on hands 2x10 with 2 # weight    clamshells 2 x 10 orange band    Short  arc quad 20    Single leg bridging 10x2  cues to butt squeeze    Seated LAQ 10 manual resistence conn    Prone hip extensions 10x2    Prone knee flexion 10x with OTB con/ecc    90-90's 10x2 orange band    Hamstring curls on large blue ball 2x10    Hip abduction in sidelying 10x2 orange band   y Terminal knee extension with T band 10x 2 blue t-band   y TKE with BTB with opp march 1 x 10    Sidelying donkey Kick 2x10    Hip abduction at wall  Press L leg into ball standing on R x10   y Standing:   HR/TR     Single leg heel raises x 20, b/l toe raises x20       y Forward step up repeaters   Lateral step up  Step down, heel touch only 8 inch 2 x 10  repeaters with opp march  8 inch 2 x 10 B with opp abd  8 inch 2 x 10 no RUE support   y Squats  2x10 on Airex   y Lunges Alternate lunges 2 x 10 each between TM bars                             Sidestepping See below    Single leg stance with taps in front and back (slight stance knee flexion) 20    Backwards walking Yellow power cord 20 feet x4    4 way kicks in SLS 2 x 10 B with PTB    CARDIOVASCULAR     y Recumbent bike x 10 min Seat 9 level 5    Treadmill     NEURO RE-ED  CPT 04507 TOTAL TIME FOR SESSION Not performed      Static balance 30 sec floor, 30 sec foam  SLS with ball toss on floor 1 x 10 , then on " foam 2 x 10    Resisted walking with yellow power cord Fwd and bkwd 100 feet each moderate amount of tension on yellow cord    Dynamic balance High marching 30 feet x 2 with chest press with ball  Sidestepping 25 feet x 4 with BTB  Tandem walking 25 feet x 2    GAIT TRAINING  CPT  92396  0 min    Working on heel strike and pushoff       stairs Ascend reciprocal with rail, descend step to with rail     MANUAL  CPT 29204 TOTAL TIME FOR SESSION 10 min   Yes        y STM    Stretching PROM flexion and extension  Joint mobs IT band, quads and hamstrings    0 - 128    Patellar mobs; Tibio-femural posterior glides       THER ACT  CPT 85768 TOTAL TIME FOR SESSION 25 min   yes Assessment for progress note 6/24/2024- WOMAC, MMT, ROM, 6 MWT    yes Reviewed HEP        yes                             Patient Education Discussed findings on evaluation including anatomy involved and POC moving forward.   Adjusted the height of hurry cane.  Patient educated in the importance of elevation of RLE above her heart ad to use ice 10 min at a time throughout the day. Also discussed sleeping positions for increased comfort. Scar massage education and demonstration 5/6/24 7/17/2024- Discussed findings on assessment and progress towards goals, Renewing  POC for 1x/week x 4-6 weeks moving forward with patient doing gym workouts on her own in between. Patient verbalized understanding and agreement.

## 2024-07-17 NOTE — LETTER
Dear DR. Felton,    Thank you for this referral. Please review the attached notes and plan of care for your approval.  Please contact our department with any questions.     Sincerely,     Xochitl Rucker, PT  [address] [address]  Phone [phone]  Fax  [phone]    By co-signing this Plan of Care (POC) you agree to the following:  I have reviewed the the Plan of Care established by the therapist within this document and certify that the services are skilled and medically necessary. I have reviewed the plan and recommend that these services continue to meet the goals stated in this document.    PHYSICIAN SIGNATURE: __________________________________     DATE: ___________________  TIME: _____________           Physical Therapy Plan of Care 24   Effective from: 2024  Effective to: 2024    Plan ID: [id number]            Participants as of Finalize on 2024    Name Type Comments Contact Info    ANGELO Angulo Referring Provider  [phone]    Xochitl Rucker, PT Physical Therapist         Last Progress Notes Note     Author: Xochitl Rucker, PT Status: Signed Last edited: 2024  9:00 AM       Physical Therapy Progress Note    [facility] Fax: [phone]    PT RE-EVALUATION FOR OUTPATIENT THERAPY    Patient: Renee Bustamante     MRN: [id number]  : 1965 58 y.o.    Referring Physician: Magnolia Felton PA C  Date of Visit: 2024    New Certification Dates: 24 through 24    Recommended Frequency & Duration:  1 time/week for up to 6 weeks        Diagnosis:   1. History of total knee arthroplasty, right    2. Gait abnormality        Chief Complaints:  Chief Complaint   Patient presents with   • Decreased Endurance   • Dec ROM   • Dec Strength   • Abnormality Of Gait       Precautions:   Existing Precautions/Restrictions: no known precautions/restrictions    TODAY'S VISIT:    Time In Session:  Start Time: 0900  Stop Time: 1000  Time  Calculation (min): 60 min   General Information - 07/17/24 0910          Session Details    Document Type re-evaluation     Mode of Treatment individual therapy        General Information    Onset of Illness/Injury or Date of Surgery 04/01/24     Referring Physician Magnolia TSE     History of present illness/functional impairment This 58 year old patient is s/p R TKA on 4/1/2024 with  at [facility]. She states she tore the meniscus in her R knee while attending barre class in April of 2023 which was confirmed by MRI in June. She had knee pain x 3 years prior to that and had tried cortisone shots ad synvisc No post op complications were reported and she went home the next day. She now presents to OPPT at [facility].     Patient/Family/Caregiver Comments/Observations Soumya reports no pain in R knee on arrival, but continued clicking at posterlateral aspect of R knee. She feels her ambulation distances are still limited and she did not attempt walking on the sand while at the beach.     Existing Precautions/Restrictions no known precautions/restrictions                    Daily Falls Screen - 07/17/24 0910          Daily Falls Assessment    Patient reported fall since last visit No                    Pain/Vitals - 07/17/24 0910          Pain Assessment    Currently in pain No/Denies        Pre Activity Vital Signs    Pulse 77     /53     BP Location Left upper arm     BP Method Automatic     Patient Position Sitting        Pain Intervention    Intervention  reassessment, MT, TE     Post Intervention Comments no pain                    PT - 07/17/24 0910          Physical Therapy    Physical Therapy Specialty [facility] PT        PT Plan    Frequency of treatment 1 time/week     PT Duration 6 weeks     PT Custom Frequency and Duration --     PT Cert From 07/17/24     PT Cert To 08/28/24     Date PT POC was sent to provider 07/18/24     Signed PT Plan of Care received?  No                     Assessment and Plan - 07/17/24 0942          Assessment    Plan of Care reviewed and patient/family in agreement Yes     System Pathology/Pathophysiology Noted musculoskeletal;neuromuscular     Functional Limitations in Following Categories (PT Eval) home management;community/leisure     Rehab Potential/Prognosis good, to achieve stated therapy goals     Problem List decreased endurance;decreased ROM;decreased strength;pain;impaired balance     Clinical Assessment Tiffanie Bustamante was seen for re-eval today, reporting no pain in R knee on arrival with pain 2/10  at worst. Her PROM is 0-129 and her AROM is 0-125. Her strength continues to improve and is currently 4+-5/5 in the RLE. Her standing tolerance improved from 15 to 30 minutes and her walking tolerance improved from 15 to 20 minutes. Her 6 MWT = 1708 feet which is slightly below norms and she continues to demonstrate decreased TKE in the stance phase of gait. She is ascending and descending stairs without HR reciprocally. Her WOMAC score improved from 32% to 12.5%, indicating much improved functional tolerance and mobility. She will benefit from ongoing OPPT 1x/week for 4-6 more weeks in addition to her workouts at the gym to address her remaining deficits so that she may return to her full gym routine and walking on all surfaces without limitations.     Plan and Recommendations Continue PT with focus on higher level balance and strengthening activities as well as gait training all surfaces.     Planned Services CPT 55951 Gait training;CPT 38889 Manual therapy;CPT 28222 Neuromuscular Reeducation;CPT 97891 Therapeutic activities;CPT 45238 Therapeutic exercises;CPT 98566 Hot/Cold Packs therapy                     OBJECTIVE MEASUREMENTS/DATA:     Palpation    Palpation - 07/18/24 0942          Palpation    LE Palpation  Patient with continued tightness at ITB insertion laterally. Improved patellar mobility noted.                   Gait and Mobility     Gait and Mobility - 07/17/24 0942          Gait Training    Leslie, Gait independent     Variable surfaces Flat surface     Assistive Device none     Pattern step-through     Comment Tiffanie is ambulating without AD with good gait speed and heel strike, but demonstrates decreased TKE in stance phase with slight decreased push off.        Stairs Assessment    Leslie, Stairs independent     Assistive Device none     Ascending Stairs Technique step-over-step     Descending Stairs Technique step-over-step                     ROM and MMT          4/24/2024 5/29/2024 6/24/2024 7/17/2024   PT LE ROM Measurements   AROM: Right LE Gross Movement WNL       R hip      AROM: Left LE Gross Movement WNL      PROM: Right Knee Flexion  125 128 129   AROM: Right Knee Flexion 89 118 122 126   AROM: Left Knee Flexion 14 140     PROM: Right Knee Extension  0 0 0   AROM: Right Knee Extension -7 -4 0 0   AROM: Left Knee Extension 0 0     PROM: Right Ankle DF  12 degrees 13 degrees    AROM: Right Ankle DF 5 degrees 10 degrees 10 degrees 12 degrees   AROM: Left Ankle DF 10 degrees 10 degrees     PT Cervical/Lumbar/Other ROM Measurements   Right Knee Girth measurement Mid patella= 41 cm; 15 cm up= 46 cm; 15 cm down= 37 cm      Left Knee Girth measurement Mid patella 39 cm, 15 cm up= 46 cm; 15 cm down= 39 cm      PT LE MMT   Right Hip Flexion (4/5) good (4+/5) good plus (4+/5) good plus (5/5) normal   Left Hip Flexion (5/5) normal (5/5) normal     Right Hip Extension (4/5) good (4+/5) good plus (4+/5) good plus (4+/5) good plus   Left Hip Extension (4+/5) good plus (5/5) normal     Right Hip ABD (4-/5) good minus (4/5) good (4+/5) good plus (4+/5) good plus   Left Hip ABD (4+/5) good plus (5/5) normal     Right Hip ADD (4/5) good (4/5) good (4+/5) good plus (5/5) normal   Left Hip ADD (5/5) normal      Right Hip IR  (4/5) good (4+/5) good plus (5/5) normal   Right Hip ER (4-/5) good minus (4/5) good (4+/5) good plus (4+/5) good  "plus   Left Hip ER (4+/5) good plus (5/5) normal     Right Knee Flexion (4-/5) good minus (4/5) good (4/5) good (4+/5) good plus   Left Knee Flexion (5/5) normal (5/5) normal     Right Knee Extension (4-/5) good minus (4/5) good (4+/5) good plus (5/5) normal   Left Knee Extension (5/5) normal (5/5) normal     Right Ankle DF (4+/5) good plus (4+/5) good plus (5/5) normal (5/5) normal   Left Ankle DF (5/5) normal (5/5) normal     Right Ankle PF (4/5) good (4/5) good (4/5) good (4+/5) good plus   Left Ankle PF (5/5) normal (5/5) normal       Outcome Measures          4/24/2024    10:00 5/29/2024    09:00 6/24/2024    10:15 7/17/2024    09:10   PT OBJECTIVE Outcome Measures   6 Minute Walk Test    1708 feet   10 Meter Walk Test 11.5 meters/sec 7.08 meters/sec  6.6 meters/sec   Gait Speed (m/sec) 0.87 m/sec       with Hurrycane 1.41 m/sec  1.52 m/sec   PT SUBJECTIVE Outcome Measures   Other WOMAC score= 46/96= 48%; Sitting tolerance unlimited, standing tolerance 10 minutes; walking tolerance 10-15 miutes WOMAC= 29/96= 30%; Standing tolerance 10 min; walking tolerance= 15 min WOMAC= 31/96= 32%; Standing tolerance 15 min; walking tolerance 15 min WOMAC= 12/96= 12.5%; Standing tolerance 30 min; walking tolerance 20 min       Today's Treatment::    PT FLOWSHEET  Renee Valentine  \"Soumya\"    s/p R TKA  Performed PT Exercises Current Session Time    HOT PACK/COLD PACK  CPT 06234   TOTAL TIME FOR SESSION Not performed    Ice Ice R knee 10 min at end of session non billed    Heat     THER EX  CPT 51628 TOTAL TIME FOR SESSION   25 Minutes    Updated HEP     MOBILIZING    y Heel slides 10 with strap    Term knee ext    y Hamstring stretch 3 x 30 sec longsitting    IT band stretch    y Gastroc stretch on board 3 x 30 sec   y Prone quad stretch 4 x 20 sec    Modified Harshil stretch     STRENGTHENING          Ankle pumps     SLR with quad set on hands 2x10 with 2 # weight    clamshells 2 x 10 orange band    Short  arc quad 20    " Single leg bridging 10x2  cues to butt squeeze    Seated LAQ 10 manual resistence conn    Prone hip extensions 10x2    Prone knee flexion 10x with OTB con/ecc    90-90's 10x2 orange band    Hamstring curls on large blue ball 2x10    Hip abduction in sidelying 10x2 orange band   y Terminal knee extension with T band 10x 2 blue t-band   y TKE with BTB with opp march 1 x 10    Sidelying donkey Kick 2x10    Hip abduction at wall  Press L leg into ball standing on R x10   y Standing:   HR/TR     Single leg heel raises x 20, b/l toe raises x20       y Forward step up repeaters   Lateral step up  Step down, heel touch only 8 inch 2 x 10  repeaters with opp march  8 inch 2 x 10 B with opp abd  8 inch 2 x 10 no RUE support   y Squats  2x10 on Airex   y Lunges Alternate lunges 2 x 10 each between TM bars                             Sidestepping See below    Single leg stance with taps in front and back (slight stance knee flexion) 20    Backwards walking Yellow power cord 20 feet x4    4 way kicks in SLS 2 x 10 B with PTB    CARDIOVASCULAR     y Recumbent bike x 10 min Seat 9 level 5    Treadmill     NEURO RE-ED  CPT 91847 TOTAL TIME FOR SESSION Not performed      Static balance 30 sec floor, 30 sec foam  SLS with ball toss on floor 1 x 10 , then on foam 2 x 10    Resisted walking with yellow power cord Fwd and bkwd 100 feet each moderate amount of tension on yellow cord    Dynamic balance High marching 30 feet x 2 with chest press with ball  Sidestepping 25 feet x 4 with BTB  Tandem walking 25 feet x 2    GAIT TRAINING  CPT  61867  0 min    Working on heel strike and pushoff       stairs Ascend reciprocal with rail, descend step to with rail     MANUAL  CPT 27122 TOTAL TIME FOR SESSION 10 min   Yes        y STM    Stretching PROM flexion and extension  Joint mobs IT band, quads and hamstrings    0 - 128    Patellar mobs; Tibio-femural posterior glides       THER ACT  CPT 07612 TOTAL TIME FOR SESSION 25 min   yes Assessment  for progress note 6/24/2024- WOMAC, MMT, ROM, 6 MWT    yes Reviewed HEP        yes                             Patient Education Discussed findings on evaluation including anatomy involved and POC moving forward.   Adjusted the height of hurry cane.  Patient educated in the importance of elevation of RLE above her heart ad to use ice 10 min at a time throughout the day. Also discussed sleeping positions for increased comfort. Scar massage education and demonstration 5/6/24 7/17/2024- Discussed findings on assessment and progress towards goals, Renewing  POC for 1x/week x 4-6 weeks moving forward with patient doing gym workouts on her own in between. Patient verbalized understanding and agreement.              Goals:   Goals       • PT RW STG and LTG      Short Term Goal Time Frame Achieved Comment   Pt will increase R knee PROM by >/= 15 degrees 4 weeks met    Pt will increase R knee AROM by >/= 10 degrees for improved functional motion with stair negotiation 4 weeks  met     Pt will increase RLE MMT by 1/2 grade or more for improved functional strength with weight bearing activities 4 weeks  met     Pt will score 35% or better on the WOMAC for improved tolerance in daily functional ambulation and stair negotiation 4 weeks  met  5/29= 30%   Pt will be supervision with HEP 4 weeks met    Pt will decrease pain of R knee to </ 3/10 at worst for improved daily tolerance with weight bearing activities 4 weeks met         Long Term Goal Time Frame Achieved Comment   Pt will demonstrate R knee AROM to WNL all planes to allow improved motion with daily functional ambulation, stair negotiation, squatting 8 -12 weeks  ongoing   7/17/24= 0-129* passively, 0-125* actively   Pt will demonstrate 5/5 MMT of RLE  to improve functional strength with daily weight bearing activities 8-12 weeks  ongoing  4+to5/5   Pt will score 20% or better on WOMAC for improved functional tolerance with ambulation and stair negotiation  8-12weeks   met  7/17/2024= 12.5%   Pt will perform walking outdoors and return too gym classes without limitation  8-12weeks ongoing    Pt will be independent with HEP  8-12weeks ongoing    Patient will ambulate on all surfaces without AD with normalized  gait pattern and gait speed >/= 1.5 m/sec 8-12 weeks ongoing Met for gait speed= 1.52 m/sec   6MWT will improve to 1765 feet with good gait mechanics to meet age related norm. 4-6 weeks new    Pt will demonstrate </=2/10 R knee pain to improve tolerance with weight bearing activities 8 -12weeks met Average 0-1/10  Worst 2/10                    This 58 y.o. year old female presents to PT with above stated diagnosis. Physical Therapy evaluation reveals decreased endurance, decreased ROM, decreased strength, pain, impaired balance resulting in home management, community/leisure limitations. Renee Bustamante will benefit from skilled PT services to address limitations, work towards rehab and patient goals and maximize PLOF of chosen ADLs.     Planned Services: The patient's treatment will include CPT 65079 Gait training, CPT 03496 Manual therapy, CPT 37421 Neuromuscular Reeducation, CPT 91603 Therapeutic activities, CPT 17187 Therapeutic exercises, CPT 70821 Hot/Cold Packs therapy, .     Xochitl Rucker, PT                         Current Participants as of 7/18/2024    Name Type Comments Contact Info    ANGELO Angulo Referring Provider  [phone]    Signature pending    Xochitl Rucker, PT Physical Therapist      Signature pending

## 2024-07-18 NOTE — PROGRESS NOTES
Physical Therapy Progress Note    [facility] Fax: [phone]    PT RE-EVALUATION FOR OUTPATIENT THERAPY    Patient: Renee Bustamante     MRN: [id number]  : 1965 58 y.o.    Referring Physician: Magnolia Felton PA C  Date of Visit: 2024    New Certification Dates: 24 through 24    Recommended Frequency & Duration:  1 time/week for up to 6 weeks        Diagnosis:   1. History of total knee arthroplasty, right    2. Gait abnormality        Chief Complaints:  Chief Complaint   Patient presents with    Decreased Endurance    Dec ROM    Dec Strength    Abnormality Of Gait       Precautions:   Existing Precautions/Restrictions: no known precautions/restrictions    TODAY'S VISIT:    Time In Session:  Start Time: 0900  Stop Time: 1000  Time Calculation (min): 60 min   General Information - 24 0910          Session Details    Document Type re-evaluation     Mode of Treatment individual therapy        General Information    Onset of Illness/Injury or Date of Surgery 24     Referring Physician Magnolia TSE     History of present illness/functional impairment This 58 year old patient is s/p R TKA on 2024 with  at [facility]. She states she tore the meniscus in her R knee while attending barre class in 2023 which was confirmed by MRI in . She had knee pain x 3 years prior to that and had tried cortisone shots ad synvisc No post op complications were reported and she went home the next day. She now presents to OPPT at [facility].     Patient/Family/Caregiver Comments/Observations Soumya reports no pain in R knee on arrival, but continued clicking at posterlateral aspect of R knee. She feels her ambulation distances are still limited and she did not attempt walking on the sand while at the beach.     Existing Precautions/Restrictions no known precautions/restrictions                    Daily Falls Screen - 24 0910          Daily Falls Assessment     Patient reported fall since last visit No                    Pain/Vitals - 07/17/24 0910          Pain Assessment    Currently in pain No/Denies        Pre Activity Vital Signs    Pulse 77     /53     BP Location Left upper arm     BP Method Automatic     Patient Position Sitting        Pain Intervention    Intervention  reassessment, MT, TE     Post Intervention Comments no pain                    PT - 07/17/24 0910          Physical Therapy    Physical Therapy [facility] PT        PT Plan    Frequency of treatment 1 time/week     PT Duration 6 weeks     PT Custom Frequency and Duration --     PT Cert From 07/17/24     PT Cert To 08/28/24     Date PT POC was sent to provider 07/18/24     Signed PT Plan of Care received?  No                    Assessment and Plan - 07/17/24 0942          Assessment    Plan of Care reviewed and patient/family in agreement Yes     System Pathology/Pathophysiology Noted musculoskeletal;neuromuscular     Functional Limitations in Following Categories (PT Eval) home management;community/leisure     Rehab Potential/Prognosis good, to achieve stated therapy goals     Problem List decreased endurance;decreased ROM;decreased strength;pain;impaired balance     Clinical Assessment Tiffanie Bustamante was seen for re-eval today, reporting no pain in R knee on arrival with pain 2/10  at worst. Her PROM is 0-129 and her AROM is 0-125. Her strength continues to improve and is currently 4+-5/5 in the RLE. Her standing tolerance improved from 15 to 30 minutes and her walking tolerance improved from 15 to 20 minutes. Her 6 MWT = 1708 feet which is slightly below norms and she continues to demonstrate decreased TKE in the stance phase of gait. She is ascending and descending stairs without HR reciprocally. Her WOMAC score improved from 32% to 12.5%, indicating much improved functional tolerance and mobility. She will benefit from ongoing OPPT 1x/week for 4-6 more weeks in addition  to her workouts at the gym to address her remaining deficits so that she may return to her full gym routine and walking on all surfaces without limitations.     Plan and Recommendations Continue PT with focus on higher level balance and strengthening activities as well as gait training all surfaces.     Planned Services CPT 90730 Gait training;CPT 68115 Manual therapy;CPT 23493 Neuromuscular Reeducation;CPT 81469 Therapeutic activities;CPT 45566 Therapeutic exercises;CPT 79915 Hot/Cold Packs therapy                     OBJECTIVE MEASUREMENTS/DATA:     Palpation    Palpation - 07/18/24 0942          Palpation    LE Palpation  Patient with continued tightness at ITB insertion laterally. Improved patellar mobility noted.                   Gait and Mobility    Gait and Mobility - 07/17/24 0942          Gait Training    [place], Gait independent     Variable surfaces Flat surface     Assistive Device none     Pattern step-through     Comment Tiffanie is ambulating without AD with good gait speed and heel strike, but demonstrates decreased TKE in stance phase with slight decreased push off.        Stairs Assessment    [place], Stairs independent     Assistive Device none     Ascending Stairs Technique step-over-step     Descending Stairs Technique step-over-step                     ROM and MMT          4/24/2024 5/29/2024 6/24/2024 7/17/2024   PT LE ROM Measurements   AROM: Right LE Gross Movement WNL       R hip      AROM: Left LE Gross Movement WNL      PROM: Right Knee Flexion  125 128 129   AROM: Right Knee Flexion 89 118 122 126   AROM: Left Knee Flexion 14 140     PROM: Right Knee Extension  0 0 0   AROM: Right Knee Extension -7 -4 0 0   AROM: Left Knee Extension 0 0     PROM: Right Ankle DF  12 degrees 13 degrees    AROM: Right Ankle DF 5 degrees 10 degrees 10 degrees 12 degrees   AROM: Left Ankle DF 10 degrees 10 degrees     PT Cervical/Lumbar/Other ROM Measurements   Right Knee Girth measurement Mid  patella= 41 cm; 15 cm up= 46 cm; 15 cm down= 37 cm      Left Knee Girth measurement Mid patella 39 cm, 15 cm up= 46 cm; 15 cm down= 39 cm      PT LE MMT   Right Hip Flexion (4/5) good (4+/5) good plus (4+/5) good plus (5/5) normal   Left Hip Flexion (5/5) normal (5/5) normal     Right Hip Extension (4/5) good (4+/5) good plus (4+/5) good plus (4+/5) good plus   Left Hip Extension (4+/5) good plus (5/5) normal     Right Hip ABD (4-/5) good minus (4/5) good (4+/5) good plus (4+/5) good plus   Left Hip ABD (4+/5) good plus (5/5) normal     Right Hip ADD (4/5) good (4/5) good (4+/5) good plus (5/5) normal   Left Hip ADD (5/5) normal      Right Hip IR  (4/5) good (4+/5) good plus (5/5) normal   Right Hip ER (4-/5) good minus (4/5) good (4+/5) good plus (4+/5) good plus   Left Hip ER (4+/5) good plus (5/5) normal     Right Knee Flexion (4-/5) good minus (4/5) good (4/5) good (4+/5) good plus   Left Knee Flexion (5/5) normal (5/5) normal     Right Knee Extension (4-/5) good minus (4/5) good (4+/5) good plus (5/5) normal   Left Knee Extension (5/5) normal (5/5) normal     Right Ankle DF (4+/5) good plus (4+/5) good plus (5/5) normal (5/5) normal   Left Ankle DF (5/5) normal (5/5) normal     Right Ankle PF (4/5) good (4/5) good (4/5) good (4+/5) good plus   Left Ankle PF (5/5) normal (5/5) normal       Outcome Measures          4/24/2024    10:00 5/29/2024    09:00 6/24/2024    10:15 7/17/2024    09:10   PT OBJECTIVE Outcome Measures   6 Minute Walk Test    1708 feet   10 Meter Walk Test 11.5 meters/sec 7.08 meters/sec  6.6 meters/sec   Gait Speed (m/sec) 0.87 m/sec       with Hurrycane 1.41 m/sec  1.52 m/sec   PT SUBJECTIVE Outcome Measures   Other WOMAC score= 46/96= 48%; Sitting tolerance unlimited, standing tolerance 10 minutes; walking tolerance 10-15 miutes WOMAC= 29/96= 30%; Standing tolerance 10 min; walking tolerance= 15 min WOMAC= 31/96= 32%; Standing tolerance 15 min; walking tolerance 15 min WOMAC= 12/96= 12.5%;  "Standing tolerance 30 min; walking tolerance 20 min       Today's Treatment::    PT FLOWSHEET  Renee Valentine  \"Soumya\"    s/p R TKA  Performed PT Exercises Current Session Time    HOT PACK/COLD PACK  CPT 50577   TOTAL TIME FOR SESSION Not performed    Ice Ice R knee 10 min at end of session non billed    Heat     THER EX  CPT 74926 TOTAL TIME FOR SESSION   25 Minutes    Updated HEP     MOBILIZING    y Heel slides 10 with strap    Term knee ext    y Hamstring stretch 3 x 30 sec longsitting    IT band stretch    y Gastroc stretch on board 3 x 30 sec   y Prone quad stretch 4 x 20 sec    Modified Harshil stretch     STRENGTHENING          Ankle pumps     SLR with quad set on hands 2x10 with 2 # weight    clamshells 2 x 10 orange band    Short  arc quad 20    Single leg bridging 10x2  cues to butt squeeze    Seated LAQ 10 manual resistence conn    Prone hip extensions 10x2    Prone knee flexion 10x with OTB con/ecc    90-90's 10x2 orange band    Hamstring curls on large blue ball 2x10    Hip abduction in sidelying 10x2 orange band   y Terminal knee extension with T band 10x 2 blue t-band   y TKE with BTB with opp march 1 x 10    Sidelying donkey Kick 2x10    Hip abduction at wall  Press L leg into ball standing on R x10   y Standing:   HR/TR     Single leg heel raises x 20, b/l toe raises x20       y Forward step up repeaters   Lateral step up  Step down, heel touch only 8 inch 2 x 10  repeaters with opp march  8 inch 2 x 10 B with opp abd  8 inch 2 x 10 no RUE support   y Squats  2x10 on Airex   y Lunges Alternate lunges 2 x 10 each between TM bars                             Sidestepping See below    Single leg stance with taps in front and back (slight stance knee flexion) 20    Backwards walking Yellow power cord 20 feet x4    4 way kicks in SLS 2 x 10 B with PTB    CARDIOVASCULAR     y Recumbent bike x 10 min Seat 9 level 5    Treadmill     NEURO RE-ED  CPT 52945 TOTAL TIME FOR SESSION Not performed      Static " balance 30 sec floor, 30 sec foam  SLS with ball toss on floor 1 x 10 , then on foam 2 x 10    Resisted walking with yellow power cord Fwd and bkwd 100 feet each moderate amount of tension on yellow cord    Dynamic balance High marching 30 feet x 2 with chest press with ball  Sidestepping 25 feet x 4 with BTB  Tandem walking 25 feet x 2    GAIT TRAINING  CPT  45227  0 min    Working on heel strike and pushoff       stairs Ascend reciprocal with rail, descend step to with rail     MANUAL  CPT 25725 TOTAL TIME FOR SESSION 10 min   Yes        y STM    Stretching PROM flexion and extension  Joint mobs IT band, quads and hamstrings    0 - 128    Patellar mobs; Tibio-femural posterior glides       THER ACT  CPT 29333 TOTAL TIME FOR SESSION 25 min   yes Assessment for progress note 6/24/2024- WOMAC, MMT, ROM, 6 MWT    yes Reviewed HEP        yes                             Patient Education Discussed findings on evaluation including anatomy involved and POC moving forward.   Adjusted the height of hurry cane.  Patient educated in the importance of elevation of RLE above her heart ad to use ice 10 min at a time throughout the day. Also discussed sleeping positions for increased comfort. Scar massage education and demonstration 5/6/24 7/17/2024- Discussed findings on assessment and progress towards goals, Renewing  POC for 1x/week x 4-6 weeks moving forward with patient doing gym workouts on her own in between. Patient verbalized understanding and agreement.              Goals:   Goals        PT RW STG and LTG      Short Term Goal Time Frame Achieved Comment   Pt will increase R knee PROM by >/= 15 degrees 4 weeks met    Pt will increase R knee AROM by >/= 10 degrees for improved functional motion with stair negotiation 4 weeks  met     Pt will increase RLE MMT by 1/2 grade or more for improved functional strength with weight bearing activities 4 weeks  met     Pt will score 35% or better on the WOMAC for improved tolerance  in daily functional ambulation and stair negotiation 4 weeks  met  5/29= 30%   Pt will be supervision with HEP 4 weeks met    Pt will decrease pain of R knee to </ 3/10 at worst for improved daily tolerance with weight bearing activities 4 weeks met         Long Term Goal Time Frame Achieved Comment   Pt will demonstrate R knee AROM to WNL all planes to allow improved motion with daily functional ambulation, stair negotiation, squatting 8 -12 weeks  ongoing   7/17/24= 0-129* passively, 0-125* actively   Pt will demonstrate 5/5 MMT of RLE  to improve functional strength with daily weight bearing activities 8-12 weeks  ongoing  4+to5/5   Pt will score 20% or better on WOMAC for improved functional tolerance with ambulation and stair negotiation  8-12weeks  met  7/17/2024= 12.5%   Pt will perform walking outdoors and return too gym classes without limitation  8-12weeks ongoing    Pt will be independent with HEP  8-12weeks ongoing    Patient will ambulate on all surfaces without AD with normalized  gait pattern and gait speed >/= 1.5 m/sec 8-12 weeks ongoing Met for gait speed= 1.52 m/sec   6MWT will improve to 1765 feet with good gait mechanics to meet age related norm. 4-6 weeks new    Pt will demonstrate </=2/10 R knee pain to improve tolerance with weight bearing activities 8 -12weeks met Average 0-1/10  Worst 2/10                    This 58 y.o. year old female presents to PT with above stated diagnosis. Physical Therapy evaluation reveals decreased endurance, decreased ROM, decreased strength, pain, impaired balance resulting in home management, community/leisure limitations. Renee Bustamante will benefit from skilled PT services to address limitations, work towards rehab and patient goals and maximize PLOF of chosen ADLs.     Planned Services: The patient's treatment will include CPT 92962 Gait training, CPT 52720 Manual therapy, CPT 43166 Neuromuscular Reeducation, CPT 93504 Therapeutic activities, CPT 98927  Therapeutic exercises, CPT 23319 Hot/Cold Packs therapy, .     Xochitl Rucker, PT

## 2024-07-26 ENCOUNTER — HOSPITAL ENCOUNTER (OUTPATIENT)
Dept: OCCUPATIONAL THERAPY | Facility: REHABILITATION | Age: 59
Setting detail: THERAPIES SERIES
Discharge: HOME | End: 2024-07-26
Attending: PHYSICIAN ASSISTANT
Payer: COMMERCIAL

## 2024-07-26 DIAGNOSIS — Z96.651 HISTORY OF TOTAL KNEE ARTHROPLASTY, RIGHT: Primary | ICD-10-CM

## 2024-07-26 DIAGNOSIS — R26.9 GAIT ABNORMALITY: ICD-10-CM

## 2024-07-26 PROCEDURE — 97110 THERAPEUTIC EXERCISES: CPT | Mod: GP

## 2024-07-26 PROCEDURE — 97112 NEUROMUSCULAR REEDUCATION: CPT | Mod: GP

## 2024-07-26 NOTE — OP PT TREATMENT LOG
"PT FLOWSHEET  Renee Nagy Cabe  \"Soumya\"    s/p R TKA  Performed PT Exercises Current Session Time    HOT PACK/COLD PACK  CPT 90645   TOTAL TIME FOR SESSION Not performed    Ice Ice R knee 10 min at end of session non billed    Heat     THER EX  CPT 24139 TOTAL TIME FOR SESSION   40 Minutes    Updated HEP     MOBILIZING     Heel slides 10 with strap    Term knee ext    y Hamstring stretch 3 x 30 sec longsitting    IT band stretch    y Gastroc stretch on board 3 x 30 sec   y Prone quad stretch 4 x 20 sec    Modified Harshil stretch     STRENGTHENING          Ankle pumps     SLR with quad set on hands 2x10 with 2 # weight    clamshells 2 x 10 orange band    Short  arc quad 20    Single leg bridging 10x2  cues to butt squeeze    Seated LAQ 10 manual resistence conn    Prone hip extensions 10x2    Prone knee flexion 10x with OTB con/ecc    90-90's 10x2 orange band    Hamstring curls on large blue ball 2x10    Hip abduction in sidelying 10x2 orange band   y Terminal knee extension with T band 10x 2 blue t-band   y TKE with BTB with opp march 2 x 10    Sidelying donkey Kick 2x10    Hip abduction at wall  Press L leg into ball standing on R x10   y Standing:   HR/TR     Single leg heel raises x 20, b/l toe raises x20   y  y  y Forward step up repeaters   Lateral step up  Step down, heel touch only 8 inch 2 x 10  repeaters with opp march  8 inch 2 x 10 B with opp abd  6 inch 2 x 10 no RUE support (tried 8 inch, but not able without compensation)   y Squats  2x10 on Airex    Lunges Alternate lunges 2 x 10 each between TM bars                             Sidestepping See below    Single leg stance with taps in front and back (slight stance knee flexion) 20        y 4 way kicks in SLS 2 x 10 B with GTB    CARDIOVASCULAR     y Recumbent bike x 10 min Seat 9 level 5    Treadmill     NEURO RE-ED  CPT 52807 TOTAL TIME FOR SESSION 20 min      Static balance 30 sec floor, 30 sec foam  SLS with ball toss on floor 1 x 10 , then on " foam 2 x 10   yes Resisted walking with yellow power cord Fwd and bkwd 150 feet each moderate amount of tension on yellow cord   yes Dynamic balance Lunge walking 20 feet x 4  High marching 30 feet x 2 with chest press with ball  Sidestepping 25 feet x 4 with BTB  Tandem walking 25 feet x 2    GAIT TRAINING  CPT  13284  0 min    Working on heel strike and pushoff       stairs Ascend reciprocal with rail, descend step to with rail     MANUAL  CPT 53040 TOTAL TIME FOR SESSION Not performed            STM    Stretching PROM flexion and extension  Joint mobs IT band, quads and hamstrings    0 - 128    Patellar mobs; Tibio-femural posterior glides       THER ACT  CPT 96497 TOTAL TIME FOR SESSION  Not performed    Assessment for progress note 6/24/2024- WOMAC, MMT, ROM, 6 MWT     Reviewed HEP                                     Patient Education Discussed findings on evaluation including anatomy involved and POC moving forward.   Adjusted the height of hurry cane.  Patient educated in the importance of elevation of RLE above her heart ad to use ice 10 min at a time throughout the day. Also discussed sleeping positions for increased comfort. Scar massage education and demonstration 5/6/24 7/17/2024- Discussed findings on assessment and progress towards goals, Renewing  POC for 1x/week x 4-6 weeks moving forward with patient doing gym workouts on her own in between. Patient verbalized understanding and agreement.

## 2024-07-26 NOTE — PROGRESS NOTES
Physical Therapy Visit    PT DAILY NOTE FOR OUTPATIENT THERAPY    Patient: Renee Bustamante MRN: [id number]  : 1965 58 y.o.  Referring Physician: Magnolia Felton PA C  Date of Visit: 2024    Certification Dates: 24 through 24    Diagnosis:   1. History of total knee arthroplasty, right    2. Gait abnormality        Chief Complaints:  knee pain, gait dysfunction, decreaaed ROM    Precautions:   Existing Precautions/Restrictions: no known precautions/restrictions      TODAY'S VISIT    Time In Session:  Start Time: 1000  Stop Time: 1100  Time Calculation (min): 60 min   History/Vitals/Pain/Encounter Info - 24 1003          Injury History/Precautions/Daily Required Info    Document Type daily treatment     Primary Therapist Xochitl Woods     Chief Complaint/Reason for Visit  knee pain, gait dysfunction, decreaaed ROM     Onset of Illness/Injury or Date of Surgery 24     Referring Physician Magnolia TSE     Existing Precautions/Restrictions no known precautions/restrictions     History of present illness/functional impairment This 58 year old patient is s/p R TKA on 2024 with  at [facility]. She states she tore the meniscus in her R knee while attending barre class in 2023 which was confirmed by MRI in . She had knee pain x 3 years prior to that and had tried cortisone shots ad synvisc No post op complications were reported and she went home the next day. She now presents to OPPT at [facility].     Patient/Family/Caregiver Comments/Observations Patient went to the beach this week with her brother and was able to walk on the beach. Her knee was slightly achy after that, but she iced it and it was fine. She drove with no problem and is headed back to the beach this weekend.     Patient reported fall since last visit No        Pain Assessment    Currently in pain No/Denies        Pain Intervention    Intervention  monitored     Post Intervention  "Comments no pain                    Daily Treatment Assessment and Plan - 07/26/24 8805          Daily Treatment Assessment and Plan    Progress toward goals Progressing     Daily Outcome Summary Tiffanie is making steady progress and was able to walk in the sand on the beach this week. She continues to work on strengthening with progression to GTB for 4 way kicks. She also progressed to walking lunges with close supervision. She reported fatigue at the end of the hour, but no pain.     Plan and Recommendations Continue PT with focus on higher level balance and strengthening activities as well as gait training all surfaces.                         Today's Treatment:    PT FLOWSHEET  Renee Valentine  \"Soumya\"    s/p R TKA  Performed PT Exercises Current Session Time    HOT PACK/COLD PACK  CPT 64887   TOTAL TIME FOR SESSION Not performed    Ice Ice R knee 10 min at end of session non billed    Heat     THER EX  CPT 03716 TOTAL TIME FOR SESSION   40 Minutes    Updated HEP     MOBILIZING     Heel slides 10 with strap    Term knee ext    y Hamstring stretch 3 x 30 sec longsitting    IT band stretch    y Gastroc stretch on board 3 x 30 sec   y Prone quad stretch 4 x 20 sec    Modified Harshil stretch     STRENGTHENING          Ankle pumps     SLR with quad set on hands 2x10 with 2 # weight    clamshells 2 x 10 orange band    Short  arc quad 20    Single leg bridging 10x2  cues to butt squeeze    Seated LAQ 10 manual resistence conn    Prone hip extensions 10x2    Prone knee flexion 10x with OTB con/ecc    90-90's 10x2 orange band    Hamstring curls on large blue ball 2x10    Hip abduction in sidelying 10x2 orange band   y Terminal knee extension with T band 10x 2 blue t-band   y TKE with BTB with opp march 2 x 10    Sidelying donkey Kick 2x10    Hip abduction at wall  Press L leg into ball standing on R x10   y Standing:   HR/TR     Single leg heel raises x 20, b/l toe raises x20   y  y  y Forward step up repeaters "   Lateral step up  Step down, heel touch only 8 inch 2 x 10  repeaters with opp march  8 inch 2 x 10 B with opp abd  6 inch 2 x 10 no RUE support (tried 8 inch, but not able without compensation)   y Squats  2x10 on Airex    Lunges Alternate lunges 2 x 10 each between TM bars                             Sidestepping See below    Single leg stance with taps in front and back (slight stance knee flexion) 20        y 4 way kicks in SLS 2 x 10 B with GTB    CARDIOVASCULAR     y Recumbent bike x 10 min Seat 9 level 5    Treadmill     NEURO RE-ED  CPT 32156 TOTAL TIME FOR SESSION 20 min      Static balance 30 sec floor, 30 sec foam  SLS with ball toss on floor 1 x 10 , then on foam 2 x 10   yes Resisted walking with yellow power cord Fwd and bkwd 150 feet each moderate amount of tension on yellow cord   yes Dynamic balance Lunge walking 20 feet x 4  High marching 30 feet x 2 with chest press with ball  Sidestepping 25 feet x 4 with BTB  Tandem walking 25 feet x 2    GAIT TRAINING  CPT  42693  0 min    Working on heel strike and pushoff       stairs Ascend reciprocal with rail, descend step to with rail     MANUAL  CPT 61325 TOTAL TIME FOR SESSION Not performed            STM    Stretching PROM flexion and extension  Joint mobs IT band, quads and hamstrings    0 - 128    Patellar mobs; Tibio-femural posterior glides       THER ACT  CPT 46919 TOTAL TIME FOR SESSION  Not performed    Assessment for progress note 6/24/2024- WOMAC, MMT, ROM, 6 MWT     Reviewed HEP                                     Patient Education Discussed findings on evaluation including anatomy involved and POC moving forward.   Adjusted the height of hurry cane.  Patient educated in the importance of elevation of RLE above her heart ad to use ice 10 min at a time throughout the day. Also discussed sleeping positions for increased comfort. Scar massage education and demonstration 5/6/24 7/17/2024- Discussed findings on assessment and progress towards  goals, Renewing  POC for 1x/week x 4-6 weeks moving forward with patient doing gym workouts on her own in between. Patient verbalized understanding and agreement.

## 2024-07-30 ENCOUNTER — APPOINTMENT (OUTPATIENT)
Dept: URBAN - METROPOLITAN AREA CLINIC 203 | Age: 59
Setting detail: DERMATOLOGY
End: 2024-07-31

## 2024-07-30 DIAGNOSIS — D485 NEOPLASM OF UNCERTAIN BEHAVIOR OF SKIN: ICD-10-CM

## 2024-07-30 DIAGNOSIS — L82.1 OTHER SEBORRHEIC KERATOSIS: ICD-10-CM

## 2024-07-30 DIAGNOSIS — L81.4 OTHER MELANIN HYPERPIGMENTATION: ICD-10-CM

## 2024-07-30 DIAGNOSIS — D22 MELANOCYTIC NEVI: ICD-10-CM

## 2024-07-30 DIAGNOSIS — D18.0 HEMANGIOMA: ICD-10-CM

## 2024-07-30 DIAGNOSIS — Z85.828 PERSONAL HISTORY OF OTHER MALIGNANT NEOPLASM OF SKIN: ICD-10-CM

## 2024-07-30 DIAGNOSIS — L57.8 OTHER SKIN CHANGES DUE TO CHRONIC EXPOSURE TO NONIONIZING RADIATION: ICD-10-CM

## 2024-07-30 DIAGNOSIS — L57.0 ACTINIC KERATOSIS: ICD-10-CM

## 2024-07-30 DIAGNOSIS — Z71.89 OTHER SPECIFIED COUNSELING: ICD-10-CM

## 2024-07-30 PROBLEM — D48.5 NEOPLASM OF UNCERTAIN BEHAVIOR OF SKIN: Status: ACTIVE | Noted: 2024-07-30

## 2024-07-30 PROBLEM — D18.01 HEMANGIOMA OF SKIN AND SUBCUTANEOUS TISSUE: Status: ACTIVE | Noted: 2024-07-30

## 2024-07-30 PROBLEM — D22.5 MELANOCYTIC NEVI OF TRUNK: Status: ACTIVE | Noted: 2024-07-30

## 2024-07-30 PROCEDURE — OTHER REASSURANCE: OTHER

## 2024-07-30 PROCEDURE — OTHER LIQUID NITROGEN: OTHER

## 2024-07-30 PROCEDURE — OTHER BIOPSY BY SHAVE METHOD: OTHER

## 2024-07-30 PROCEDURE — 99213 OFFICE O/P EST LOW 20 MIN: CPT | Mod: 25

## 2024-07-30 PROCEDURE — 11102 TANGNTL BX SKIN SINGLE LES: CPT

## 2024-07-30 PROCEDURE — OTHER COUNSELING: OTHER

## 2024-07-30 PROCEDURE — 17000 DESTRUCT PREMALG LESION: CPT | Mod: 59

## 2024-07-30 PROCEDURE — OTHER SUNSCREEN RECOMMENDATIONS: OTHER

## 2024-07-30 ASSESSMENT — LOCATION ZONE DERM
LOCATION ZONE: FACE
LOCATION ZONE: TRUNK
LOCATION ZONE: LEG

## 2024-07-30 ASSESSMENT — LOCATION SIMPLE DESCRIPTION DERM
LOCATION SIMPLE: CHEST
LOCATION SIMPLE: LEFT BREAST
LOCATION SIMPLE: LEFT POSTERIOR THIGH
LOCATION SIMPLE: RIGHT UPPER BACK
LOCATION SIMPLE: ABDOMEN
LOCATION SIMPLE: LEFT FOREHEAD

## 2024-07-30 ASSESSMENT — LOCATION DETAILED DESCRIPTION DERM
LOCATION DETAILED: EPIGASTRIC SKIN
LOCATION DETAILED: LEFT INFERIOR MEDIAL FOREHEAD
LOCATION DETAILED: LEFT MEDIAL BREAST 10-11:00 REGION
LOCATION DETAILED: LEFT FOREHEAD
LOCATION DETAILED: RIGHT SUPERIOR MEDIAL UPPER BACK
LOCATION DETAILED: LEFT DISTAL POSTERIOR THIGH
LOCATION DETAILED: LEFT MEDIAL SUPERIOR CHEST
LOCATION DETAILED: SUBXIPHOID
LOCATION DETAILED: LEFT MEDIAL BREAST 11-12:00 REGION
LOCATION DETAILED: MIDDLE STERNUM
LOCATION DETAILED: PERIUMBILICAL SKIN

## 2024-07-30 ASSESSMENT — PAIN INTENSITY VAS: HOW INTENSE IS YOUR PAIN 0 BEING NO PAIN, 10 BEING THE MOST SEVERE PAIN POSSIBLE?: NO PAIN

## 2024-07-30 NOTE — PROCEDURE: BIOPSY BY SHAVE METHOD
Detail Level: Detailed
Depth Of Biopsy: dermis
Was A Bandage Applied: Yes
Size Of Lesion In Cm: 0.4
X Size Of Lesion In Cm: 0
Biopsy Type: H and E
Biopsy Method: Dermablade
Anesthesia Type: 1% lidocaine with epinephrine
Anesthesia Volume In Cc: 0.5
Hemostasis: Drysol
Wound Care: Petrolatum
Dressing: bandage
Destruction After The Procedure: No
Type Of Destruction Used: Curettage
Curettage Text: The wound bed was treated with curettage after the biopsy was performed.
Cryotherapy Text: The wound bed was treated with cryotherapy after the biopsy was performed.
Electrodesiccation Text: The wound bed was treated with electrodesiccation after the biopsy was performed.
Electrodesiccation And Curettage Text: The wound bed was treated with electrodesiccation and curettage after the biopsy was performed.
Silver Nitrate Text: The wound bed was treated with silver nitrate after the biopsy was performed.
Lab: [id number]
Consent: Written consent was obtained and risks were reviewed including but not limited to scarring, infection, bleeding, scabbing, incomplete removal, nerve damage and allergy to anesthesia.
Post-Care Instructions: I reviewed with the patient in detail post-care instructions. Patient is to keep the biopsy site dry overnight, and then apply bacitracin twice daily until healed. Patient may apply hydrogen peroxide soaks to remove any crusting.
Notification Instructions: Patient will be notified of biopsy results. However, patient instructed to call the office if not contacted within 2 weeks.
Billing Type: Third-Party Bill
Information: Selecting Yes will display possible errors in your note based on the variables you have selected. This validation is only offered as a suggestion for you. PLEASE NOTE THAT THE VALIDATION TEXT WILL BE REMOVED WHEN YOU FINALIZE YOUR NOTE. IF YOU WANT TO FAX A PRELIMINARY NOTE YOU WILL NEED TO TOGGLE THIS TO 'NO' IF YOU DO NOT WANT IT IN YOUR FAXED NOTE.

## 2024-07-31 ENCOUNTER — HOSPITAL ENCOUNTER (OUTPATIENT)
Dept: OCCUPATIONAL THERAPY | Facility: REHABILITATION | Age: 59
Setting detail: THERAPIES SERIES
Discharge: HOME | End: 2024-07-31
Attending: PHYSICIAN ASSISTANT
Payer: COMMERCIAL

## 2024-07-31 DIAGNOSIS — Z96.651 HISTORY OF TOTAL KNEE ARTHROPLASTY, RIGHT: Primary | ICD-10-CM

## 2024-07-31 DIAGNOSIS — R26.9 GAIT ABNORMALITY: ICD-10-CM

## 2024-07-31 PROCEDURE — 97110 THERAPEUTIC EXERCISES: CPT | Mod: GP

## 2024-07-31 PROCEDURE — 97112 NEUROMUSCULAR REEDUCATION: CPT | Mod: GP

## 2024-07-31 NOTE — PROGRESS NOTES
Physical Therapy Visit    PT DAILY NOTE FOR OUTPATIENT THERAPY    Patient: Renee Bustamante MRN: [id number]  : 1965 58 y.o.  Referring Physician: Magnolia Felton PA C  Date of Visit: 2024    Certification Dates: 24 through 24    Diagnosis:   1. History of total knee arthroplasty, right    2. Gait abnormality        Chief Complaints:  knee pain, gait dysfunction, decreaaed ROM    Precautions:   Existing Precautions/Restrictions: no known precautions/restrictions      TODAY'S VISIT    Time In Session:  Start Time: 0900  Stop Time: 1000  Time Calculation (min): 60 min   History/Vitals/Pain/Encounter Info - 24          Injury History/Precautions/Daily Required Info    Document Type daily treatment     Primary Therapist Xochitl Woods     Chief Complaint/Reason for Visit  knee pain, gait dysfunction, decreaaed ROM     Onset of Illness/Injury or Date of Surgery 24     Referring Physician Magnolia TSE     Existing Precautions/Restrictions no known precautions/restrictions     History of present illness/functional impairment This 58 year old patient is s/p R TKA on 2024 with  at [facility]. She states she tore the meniscus in her R knee while attending barre class in 2023 which was confirmed by MRI in . She had knee pain x 3 years prior to that and had tried cortisone shots ad synvisc No post op complications were reported and she went home the next day. She now presents to OPPT at [facility].     Patient/Family/Caregiver Comments/Observations Pt reports having some snappy feeling behing R lateral knee.     Patient reported fall since last visit No        Pain Assessment    Currently in pain No/Denies                    Daily Treatment Assessment and Plan - 24          Daily Treatment Assessment and Plan    Progress toward goals Progressing     Daily Outcome Summary Pam did well with therapy. She is still reporting some IT  "Band snapping with knee flexion. Tried some IASTM to area f/b ice massage at end of treatment. Performed exercises as per flow sheet. Doing well with balance activities.     Plan and Recommendations Pt has just a few more appts left. She has started getting ot the gym more frequently. She will transition ot the gym post DC                         OBJECTIVE DATA TAKEN TODAY:    None taken    Today's Treatment:    PT FLOWSHEET  Renee Valentine  \"Soumya\"    s/p R TKA  Performed PT Exercises Current Session Time    HOT PACK/COLD PACK  CPT 07221   TOTAL TIME FOR SESSION Not performed    Ice Ice R knee 10 min at end of session non billed    Heat     THER EX  CPT 36184 TOTAL TIME FOR SESSION   25 Minutes    Updated HEP     MOBILIZING     Heel slides 10 with strap    Term knee ext    y Hamstring stretch 3 x 30 sec longsitting    IT band stretch    y Gastroc stretch on board 3 x 30 sec   y Prone quad stretch 4 x 20 sec    Modified Harshil stretch     STRENGTHENING          Ankle pumps    y SLR with quad set on hands 2x10 with 2 # weight    clamshells 2 x 10 orange band        y Single leg bridging 10x3  cues to butt squeeze    Seated LAQ 10 manual resistence conn   y Prone hip extensions 10x2 prone with R leg over edge of bed    Prone knee flexion 10x with OTB con/ecc    90-90's 10x2 orange band   y Hamstring curls on large blue ball 2x10    Hip abduction in sidelying 10x2 orange band   y Terminal knee extension with T band 10x 2 blue t-band   y TKE with BTB with opp march 2 x 10   y Sidelying donkey Kick 2x10    Hip abduction at wall  Press L leg into ball standing on R x10    Standing:   HR/TR     Single leg heel raises x 20, b/l toe raises x20   Y  Y  y     Forward step up repeaters   Lateral step up  Step down, heel touch only 8 inch 2 x 10  repeaters with opp march  8 inch 2 x 10 R with opp abd  6 inch 2 x 10 no RUE support (tried 8 inch, but not able without compensation)   y Squats  2x10 on Airex             y " Single leg stance with taps in front and back (slight stance knee flexion) 20         4 way kicks in SLS 2 x 10 B with GTB    CARDIOVASCULAR     y Recumbent bike x 10 min Seat 9 level 5    Treadmill     NEURO RE-ED  CPT 52207 TOTAL TIME FOR SESSION 30 min     y       Static balance 30 sec floor, 30 sec foam  SLS with ball toss on floor 1 x 10 , then on foam 2 x 10   y Resisted walking with yellow power cord Fwd and bkwd 150 feet each moderate amount of tension on yellow cord   Y  Y    Y    y Dynamic balance Lunge walking 20 feet x 4  High marching 30 feet x 2 with chest press with ball  Sidestepping 25 feet x 4 with BTB at knees    Tandem walking 25 feet x 2    GAIT TRAINING  CPT  00418  0 min    Working on heel strike and pushoff       stairs Ascend reciprocal with rail, descend step to with rail     MANUAL  CPT 75829 TOTAL TIME FOR SESSION 5 min     y         STM  IASTM to R ITBand distally    Stretching PROM flexion and extension  Joint mobs IT band, quads and hamstrings  4 min f/b ice massage to same area    0 - 128    Patellar mobs; Tibio-femural posterior glides       THER ACT  CPT 08387 TOTAL TIME FOR SESSION  Not performed    Assessment for progress note 6/24/2024- WOMAC, MMT, ROM, 6 MWT     Reviewed HEP                                     Patient Education Discussed findings on evaluation including anatomy involved and POC moving forward.   Adjusted the height of hurry cane.  Patient educated in the importance of elevation of RLE above her heart ad to use ice 10 min at a time throughout the day. Also discussed sleeping positions for increased comfort. Scar massage education and demonstration 5/6/24 7/17/2024- Discussed findings on assessment and progress towards goals, Renewing  POC for 1x/week x 4-6 weeks moving forward with patient doing gym workouts on her own in between. Patient verbalized understanding and agreement.

## 2024-07-31 NOTE — OP PT TREATMENT LOG
"PT FLOWSHEET  Renee Nagy Cabe  \"Soumya\"    s/p R TKA  Performed PT Exercises Current Session Time    HOT PACK/COLD PACK  CPT 95400   TOTAL TIME FOR SESSION Not performed    Ice Ice R knee 10 min at end of session non billed    Heat     THER EX  CPT 62151 TOTAL TIME FOR SESSION   25 Minutes    Updated HEP     MOBILIZING     Heel slides 10 with strap    Term knee ext    y Hamstring stretch 3 x 30 sec longsitting    IT band stretch    y Gastroc stretch on board 3 x 30 sec   y Prone quad stretch 4 x 20 sec    Modified Harshil stretch     STRENGTHENING          Ankle pumps    y SLR with quad set on hands 2x10 with 2 # weight    clamshells 2 x 10 orange band        y Single leg bridging 10x3  cues to butt squeeze    Seated LAQ 10 manual resistence conn   y Prone hip extensions 10x2 prone with R leg over edge of bed    Prone knee flexion 10x with OTB con/ecc    90-90's 10x2 orange band   y Hamstring curls on large blue ball 2x10    Hip abduction in sidelying 10x2 orange band   y Terminal knee extension with T band 10x 2 blue t-band   y TKE with BTB with opp march 2 x 10   y Sidelying donkey Kick 2x10    Hip abduction at wall  Press L leg into ball standing on R x10    Standing:   HR/TR     Single leg heel raises x 20, b/l toe raises x20   Y  Y  y     Forward step up repeaters   Lateral step up  Step down, heel touch only 8 inch 2 x 10  repeaters with opp march  8 inch 2 x 10 R with opp abd  6 inch 2 x 10 no RUE support (tried 8 inch, but not able without compensation)   y Squats  2x10 on Airex             y Single leg stance with taps in front and back (slight stance knee flexion) 20         4 way kicks in SLS 2 x 10 B with GTB    CARDIOVASCULAR     y Recumbent bike x 10 min Seat 9 level 5    Treadmill     NEURO RE-ED  CPT 36643 TOTAL TIME FOR SESSION 30 min     y       Static balance 30 sec floor, 30 sec foam  SLS with ball toss on floor 1 x 10 , then on foam 2 x 10   y Resisted walking with yellow power cord Fwd and " bkwd 150 feet each moderate amount of tension on yellow cord   Y  Y    Y    y Dynamic balance Lunge walking 20 feet x 4  High marching 30 feet x 2 with chest press with ball  Sidestepping 25 feet x 4 with BTB at knees    Tandem walking 25 feet x 2    GAIT TRAINING  CPT  80807  0 min    Working on heel strike and pushoff       stairs Ascend reciprocal with rail, descend step to with rail     MANUAL  CPT 60910 TOTAL TIME FOR SESSION 5 min     y         STM  IASTM to R ITBand distally    Stretching PROM flexion and extension  Joint mobs IT band, quads and hamstrings  4 min f/b ice massage to same area    0 - 128    Patellar mobs; Tibio-femural posterior glides       THER ACT  CPT 67228 TOTAL TIME FOR SESSION  Not performed    Assessment for progress note 6/24/2024- WOMAC, MMT, ROM, 6 MWT     Reviewed HEP                                     Patient Education Discussed findings on evaluation including anatomy involved and POC moving forward.   Adjusted the height of hurry cane.  Patient educated in the importance of elevation of RLE above her heart ad to use ice 10 min at a time throughout the day. Also discussed sleeping positions for increased comfort. Scar massage education and demonstration 5/6/24 7/17/2024- Discussed findings on assessment and progress towards goals, Renewing  POC for 1x/week x 4-6 weeks moving forward with patient doing gym workouts on her own in between. Patient verbalized understanding and agreement.

## 2024-08-07 ENCOUNTER — HOSPITAL ENCOUNTER (OUTPATIENT)
Dept: OCCUPATIONAL THERAPY | Facility: REHABILITATION | Age: 59
Setting detail: THERAPIES SERIES
Discharge: HOME | End: 2024-08-07
Attending: PHYSICIAN ASSISTANT
Payer: COMMERCIAL

## 2024-08-07 DIAGNOSIS — Z96.651 HISTORY OF TOTAL KNEE ARTHROPLASTY, RIGHT: Primary | ICD-10-CM

## 2024-08-07 DIAGNOSIS — R26.9 GAIT ABNORMALITY: ICD-10-CM

## 2024-08-07 PROCEDURE — 97110 THERAPEUTIC EXERCISES: CPT | Mod: GP

## 2024-08-07 NOTE — OP PT TREATMENT LOG
"PT FLOWSHEET  Renee Nagy Cabe  \"Soumya\"    s/p R TKA  Performed PT Exercises Current Session Time    HOT PACK/COLD PACK  CPT 67677   TOTAL TIME FOR SESSION Not performed    Ice Ice R knee 10 min at end of session non billed    Heat     THER EX  CPT 47524 TOTAL TIME FOR SESSION   45 Minutes    Updated HEP     MOBILIZING     Heel slides 10 with strap    Term knee ext    y Hamstring stretch 3 x 30 sec longsitting    IT band stretch    y Gastroc stretch on board 3 x 30 sec   y Prone quad stretch 4 x 20 sec    Modified Harshil stretch     STRENGTHENING          Ankle pumps    y SLR with quad set on hands 2x10 with 3# weight    clamshells 2 x 10 orange band        y Single leg bridging 10x3  cues to butt squeeze    Seated LAQ 10 manual resistence conn   y Prone hip extensions 10x2 prone with R leg over edge of bed    Prone knee flexion 10x with OTB con/ecc    90-90's 10x2 orange band   y Hamstring curls on large blue ball 2x10    Hip abduction in sidelying 10x2 orange band   y Terminal knee extension with T band 10x 2 blue t-band   y TKE with BTB with opp march 2 x 10   y Sidelying donkey Kick 2x10 B    Hip abduction at wall  Press L leg into ball standing on R x10    Standing:   HR/TR     Single leg heel raises x 20, b/l toe raises x20   Y  Y  y     Forward step up repeaters   Lateral step up  Step down, heel touch only 8 inch 2 x 10  repeaters with opp march  8 inch 2 x 10 R with opp abd  6 inch 2 x 10 no RUE support (tried 8 inch, but not able without compensation)   y Squats  2x10 on Airex              Single leg stance with taps in front and back (slight stance knee flexion) 20         4 way kicks in SLS 2 x 10 B with GTB    CARDIOVASCULAR     y Recumbent bike x 10 min Seat 9 level 7    Treadmill     NEURO RE-ED  CPT 21804 TOTAL TIME FOR SESSION Not performed    Static balance 30 sec floor, 30 sec foam  SLS with ball toss on floor 1 x 10 , then on foam 2 x 10    Resisted walking with yellow power cord Fwd and bkwd " 150 feet each moderate amount of tension on yellow cord    Dynamic balance Lunge walking 20 feet x 4  High marching 30 feet x 2 with chest press with ball  Sidestepping 25 feet x 4 with BTB at knees    Tandem walking 25 feet x 2    GAIT TRAINING  CPT  22179  0 min    Working on heel strike and pushoff       stairs Ascend reciprocal with rail, descend step to with rail     MANUAL  CPT 37261 TOTAL TIME FOR SESSION 5 min     y         STM  IASTM to R ITBand distally    Stretching PROM flexion and extension  Joint mobs IT band, quads and hamstrings  5 min     0 - 128    Patellar mobs; Tibio-femural posterior glides       THER ACT  CPT 63406 TOTAL TIME FOR SESSION  Not performed    Assessment for progress note 6/24/2024- WOMAC, MMT, ROM, 6 MWT     Reviewed HEP                                     Patient Education Discussed findings on evaluation including anatomy involved and POC moving forward.   Adjusted the height of hurry cane.  Patient educated in the importance of elevation of RLE above her heart ad to use ice 10 min at a time throughout the day. Also discussed sleeping positions for increased comfort. Scar massage education and demonstration 5/6/24 7/17/2024- Discussed findings on assessment and progress towards goals, Renewing  POC for 1x/week x 4-6 weeks moving forward with patient doing gym workouts on her own in between. Patient verbalized understanding and agreement.

## 2024-08-07 NOTE — PROGRESS NOTES
Physical Therapy Visit    PT DAILY NOTE FOR OUTPATIENT THERAPY    Patient: Renee Bustamante MRN: [id number]  : 1965 58 y.o.  Referring Physician: Magnolia Felton PA C  Date of Visit: 2024    Certification Dates: 24 through 24    Diagnosis:   1. History of total knee arthroplasty, right    2. Gait abnormality        Chief Complaints:  knee pain, gait dysfunction, decreaaed ROM    Precautions:   Existing Precautions/Restrictions: no known precautions/restrictions      TODAY'S VISIT    Time In Session:  Start Time: 1300  Stop Time: 1350  Time Calculation (min): 50 min   History/Vitals/Pain/Encounter Info - 24 1303          Injury History/Precautions/Daily Required Info    Document Type daily treatment     Primary Therapist Xochitl Woods     Chief Complaint/Reason for Visit  knee pain, gait dysfunction, decreaaed ROM     Onset of Illness/Injury or Date of Surgery 24     Referring Physician Magnolia TSE     Existing Precautions/Restrictions no known precautions/restrictions     History of present illness/functional impairment This 58 year old patient is s/p R TKA on 2024 with  at [facility]. She states she tore the meniscus in her R knee while attending barre class in 2023 which was confirmed by MRI in . She had knee pain x 3 years prior to that and had tried cortisone shots ad synvisc No post op complications were reported and she went home the next day. She now presents to OPPT at [facility].     Patient/Family/Caregiver Comments/Observations Patient reports good results after IASTM to ITB last session. She hasd some pain during the night last night, but none on arrival.   Soumya was able to walk her whole neighborhood with hills for first time and felt good afterwards. She goes to [facility] and works for an hour each day.     Patient reported fall since last visit No        Pain Assessment    Currently in pain No/Denies        Pain Intervention     "Intervention  IASTM to ITB     Post Intervention Comments no pain                    Daily Treatment Assessment and Plan - 08/07/24 6339          Daily Treatment Assessment and Plan    Progress toward goals Progressing     Daily Outcome Summary TE continued with increased weights as noted. Performed all mat TE bilaterally. Session shortened due to patient needing bathroom break x 2 due to GI issues. Finished with IASTM to R ITB with no pain at the end of the session     Plan and Recommendations Will discharge n/v.                       Today's Treatment:    PT FLOWSHEET  Renee Valentine  \"Soumya\"    s/p R TKA  Performed PT Exercises Current Session Time    HOT PACK/COLD PACK  CPT 03882   TOTAL TIME FOR SESSION Not performed    Ice Ice R knee 10 min at end of session non billed    Heat     THER EX  CPT 52826 TOTAL TIME FOR SESSION   45 Minutes    Updated HEP     MOBILIZING     Heel slides 10 with strap    Term knee ext    y Hamstring stretch 3 x 30 sec longsitting    IT band stretch    y Gastroc stretch on board 3 x 30 sec   y Prone quad stretch 4 x 20 sec    Modified Harshil stretch     STRENGTHENING          Ankle pumps    y SLR with quad set on hands 2x10 with 3# weight    clamshells 2 x 10 orange band        y Single leg bridging 10x3  cues to butt squeeze    Seated LAQ 10 manual resistence conn   y Prone hip extensions 10x2 prone with R leg over edge of bed    Prone knee flexion 10x with OTB con/ecc    90-90's 10x2 orange band   y Hamstring curls on large blue ball 2x10    Hip abduction in sidelying 10x2 orange band   y Terminal knee extension with T band 10x 2 blue t-band   y TKE with BTB with opp march 2 x 10   y Sidelying donkey Kick 2x10 B    Hip abduction at wall  Press L leg into ball standing on R x10    Standing:   HR/TR     Single leg heel raises x 20, b/l toe raises x20   Y  Y  y     Forward step up repeaters   Lateral step up  Step down, heel touch only 8 inch 2 x 10  repeaters with opp march 8 " inch 2 x 10 R with opp abd  6 inch 2 x 10 no RUE support (tried 8 inch, but not able without compensation)   y Squats  2x10 on Airex              Single leg stance with taps in front and back (slight stance knee flexion) 20         4 way kicks in SLS 2 x 10 B with GTB    CARDIOVASCULAR     y Recumbent bike x 10 min Seat 9 level 7    Treadmill     NEURO RE-ED  CPT 81610 TOTAL TIME FOR SESSION Not performed    Static balance 30 sec floor, 30 sec foam  SLS with ball toss on floor 1 x 10 , then on foam 2 x 10    Resisted walking with yellow power cord Fwd and bkwd 150 feet each moderate amount of tension on yellow cord    Dynamic balance Lunge walking 20 feet x 4  High marching 30 feet x 2 with chest press with ball  Sidestepping 25 feet x 4 with BTB at knees    Tandem walking 25 feet x 2    GAIT TRAINING  CPT  34448  0 min    Working on heel strike and pushoff       stairs Ascend reciprocal with rail, descend step to with rail     MANUAL  CPT 12004 TOTAL TIME FOR SESSION 5 min     y         STM  IASTM to R ITBand distally    Stretching PROM flexion and extension  Joint mobs IT band, quads and hamstrings  5 min     0 - 128    Patellar mobs; Tibio-femural posterior glides       THER ACT  CPT 66549 TOTAL TIME FOR SESSION  Not performed    Assessment for progress note 6/24/2024- WOMAC, MMT, ROM, 6 MWT     Reviewed HEP                                     Patient Education Discussed findings on evaluation including anatomy involved and POC moving forward.   Adjusted the height of hurry cane.  Patient educated in the importance of elevation of RLE above her heart ad to use ice 10 min at a time throughout the day. Also discussed sleeping positions for increased comfort. Scar massage education and demonstration 5/6/24 7/17/2024- Discussed findings on assessment and progress towards goals, Renewing  POC for 1x/week x 4-6 weeks moving forward with patient doing gym workouts on her own in between. Patient verbalized understanding  and agreement.

## 2024-08-12 ENCOUNTER — HOSPITAL ENCOUNTER (OUTPATIENT)
Dept: OCCUPATIONAL THERAPY | Facility: REHABILITATION | Age: 59
Setting detail: THERAPIES SERIES
Discharge: HOME | End: 2024-08-12
Attending: PHYSICIAN ASSISTANT
Payer: COMMERCIAL

## 2024-08-12 DIAGNOSIS — R26.9 GAIT ABNORMALITY: ICD-10-CM

## 2024-08-12 DIAGNOSIS — Z96.651 HISTORY OF TOTAL KNEE ARTHROPLASTY, RIGHT: Primary | ICD-10-CM

## 2024-08-12 PROCEDURE — 97110 THERAPEUTIC EXERCISES: CPT | Mod: GP

## 2024-08-12 PROCEDURE — 97530 THERAPEUTIC ACTIVITIES: CPT | Mod: GP

## 2024-08-12 NOTE — OP PT TREATMENT LOG
"PT FLOWSHEET  Renee Yakov Cabe  \"Soumya\"    s/p R TKA  Performed PT Exercises Current Session Time    HOT PACK/COLD PACK  CPT 67259   TOTAL TIME FOR SESSION Not performed    Ice Ice R knee 10 min at end of session non billed    Heat     THER EX  CPT 07649 TOTAL TIME FOR SESSION   25  Minutes    Updated HEP     MOBILIZING     Heel slides 10 with strap    Term knee ext    y Hamstring stretch 3 x 30 sec longsitting    IT band stretch    y Gastroc stretch on board 3 x 30 sec   y Prone quad stretch 4 x 20 sec    Modified Harshil stretch     STRENGTHENING          Ankle pumps     SLR with quad set on hands 2x10 with 3# weight    clamshells 2 x 10 orange band         Single leg bridging 10x3  cues to butt squeeze    Seated LAQ 10 manual resistence conn    Prone hip extensions 10x2 prone with R leg over edge of bed    Prone knee flexion 10x with OTB con/ecc    90-90's 10x2 orange band    Hamstring curls on large blue ball 2x10    Hip abduction in sidelying 10x2 orange band   y Terminal knee extension with T band 10x 2 blue t-band   y TKE with BTB with opp march 2 x 10    Sidelying donkey Kick 2x10 B    Hip abduction at wall  Press L leg into ball standing on R x10    Standing:   HR/TR     Single leg heel raises x 20, b/l toe raises x20   Y  Y  y     Forward step up repeaters   Lateral step up  Step down, heel touch only 8 inch 2 x 10  repeaters with opp march  8 inch 2 x 10 R with opp abd  6 inch 2 x 10 no RUE support (tried 8 inch, but not able without compensation)    Squats  2x10 on Airex              Single leg stance with taps in front and back (slight stance knee flexion) 20         4 way kicks in SLS 2 x 10 B with GTB    CARDIOVASCULAR     y Recumbent bike x 10 min Seat 9 level 7    Treadmill     NEURO RE-ED  CPT 77861 TOTAL TIME FOR SESSION Not performed    Static balance 30 sec floor, 30 sec foam  SLS with ball toss on floor 1 x 10 , then on foam 2 x 10    Resisted walking with yellow power cord Fwd and bkwd 150 " feet each moderate amount of tension on yellow cord    Dynamic balance Lunge walking 20 feet x 4  High marching 30 feet x 2 with chest press with ball  Sidestepping 25 feet x 4 with BTB at knees    Tandem walking 25 feet x 2    GAIT TRAINING  CPT  56843  0 min    Working on heel strike and pushoff       stairs Ascend reciprocal with rail, descend step to with rail     MANUAL  CPT 16500 TOTAL TIME FOR SESSION 5 min     y         STM  IASTM to R ITBand distally    Stretching PROM flexion and extension  Joint mobs IT band, quads and hamstrings  5 min     0 - 128    Patellar mobs; Tibio-femural posterior glides       THER ACT  CPT 61784 TOTAL TIME FOR SESSION  30 min   yes Assessment for progress note 8/12/2024- WOMAC, MMT, ROM, 6 MWT    yes Reviewed HEP  8/12/2024-Issued and reviewed and updated HEP    yes                               Patient Education Discussed findings on evaluation including anatomy involved and POC moving forward.   Adjusted the height of hurry cane.  Patient educated in the importance of elevation of RLE above her heart ad to use ice 10 min at a time throughout the day. Also discussed sleeping positions for increased comfort. Scar massage education and demonstration 5/6/24 7/17/2024- Discussed findings on assessment and progress towards goals, Renewing  POC for 1x/week x 4-6 weeks moving forward with patient doing gym workouts on her own in between. Patient verbalized understanding and agreement.  8/12/2024- Discussed objective findings ad meeting goals as well as plan to continue gym workouts. Patient verbalized understanding.

## 2024-08-13 NOTE — PROGRESS NOTES
Physical Therapy Discharge      PT DISCHARGE NOTE FOR OUTPATIENT THERAPY    Patient: Renee Bustamante MRN: [id number]  : 1965 58 y.o.  Referring Physician: Magnolia Felton PA C  Date of Visit: 2024      Certification Dates: 24 through 24    Total Visit Count: 24    Diagnosis:   1. History of total knee arthroplasty, right    2. Gait abnormality        Chief Complaints:  Chief Complaint   Patient presents with    Dec Strength    Abnormality Of Gait       Precautions:  Existing Precautions/Restrictions: no known precautions/restrictions      TODAY'S VISIT:    Time In Session:  Start Time: 0900  Stop Time: 1000  Time Calculation (min): 60 min   General Information - 24          Session Details    Document Type discharge evaluation     Mode of Treatment individual therapy        General Information    Onset of Illness/Injury or Date of Surgery 24     Referring Physician Magnolia TSE     History of present illness/functional impairment This 58 year old patient is s/p R TKA on 2024 with  at [facility] [place]. She states she tore the meniscus in her R knee while attending barre class in 2023 which was confirmed by MRI in . She had knee pain x 3 years prior to that and had tried cortisone shots ad synvisc No post op complications were reported and she went home the next day. She now presents to OPPT at [facility].     Patient/Family/Caregiver Comments/Observations Soumya states she walked alot in different sneakers on Saturday and had medial knee pain yesterday, but none on arrival today, only ITB tightness. She states  she feels ready for discharge.     Existing Precautions/Restrictions no known precautions/restrictions                    Daily Falls Screen - 24          Daily Falls Assessment    Patient reported fall since last visit No                    Pain/Vitals - 24          Pain Assessment    Currently in pain No/Denies         Pain Intervention    Intervention  IASTM to ITB     Post Intervention Comments no pain                    PT - 08/12/24 0906          Physical Therapy    Physical Therapy [facility] PT        PT Plan    Frequency of treatment 1 time/week     PT Duration 6 weeks     PT Cert From 07/17/24     PT Cert To 08/28/24     Date PT POC was sent to provider 07/18/24     Signed PT Plan of Care received?  Yes                    Assessment and Plan - 08/12/24 1947          Assessment    Clinical Assessment Tiffanie Bustamante is seen today for her dicharge visit after 24 sessions for her R TKA. She reports 0/10 pain and the ROM of her R knee is 0-130 passively and 0-126* actively Her strength is 4+-5/5 in the RLE and her standing and walking tolerance is now 30 minutes with a normalaized gait. She ascends and descends a FF of stairs with no HR. Her 6MWT = 1970 feet today, which is above age related norms and her WOMAC score is 13.5%. She returns to work part time on August 18th and is now going to the gym consistently and walking 2 miles outdoors. She is Independent with her updated HEP and discharged today.     Plan and Recommendations Patient discharged.                     OBJECTIVE MEASUREMENTS/DATA:    Gait and Mobility    Gait and Mobility - 08/12/24 1947          Gait Training    Comment Tiffanie is ambulating with a normalized gait with bertter TKE in stance phase and good heel strike and push off.        Stairs Assessment    [place], Stairs independent     Assistive Device none     Ascending Stairs Technique step-over-step     Descending Stairs Technique step-over-step                     ROM and MMT          4/24/2024 5/29/2024 6/24/2024 7/17/2024 8/12/2024   PT LE ROM Measurements   AROM: Right LE Gross Movement WNL       R hip       AROM: Left LE Gross Movement WNL       PROM: Right Knee Flexion  125 128 129 130   AROM: Right Knee Flexion 89 118 122 126 126   AROM: Left Knee Flexion 14 140    140   PROM: Right Knee Extension  0 0 0 0   AROM: Right Knee Extension -7 -4 0 0 0   AROM: Left Knee Extension 0 0   0   PROM: Right Ankle DF  12 degrees 13 degrees     AROM: Right Ankle DF 5 degrees 10 degrees 10 degrees 12 degrees 12 degrees   AROM: Left Ankle DF 10 degrees 10 degrees      PT Cervical/Lumbar/Other ROM Measurements   Right Knee Girth measurement Mid patella= 41 cm; 15 cm up= 46 cm; 15 cm down= 37 cm       Left Knee Girth measurement Mid patella 39 cm, 15 cm up= 46 cm; 15 cm down= 39 cm       PT LE MMT   Right Hip Flexion (4/5) good (4+/5) good plus (4+/5) good plus (5/5) normal (5/5) normal   Left Hip Flexion (5/5) normal (5/5) normal   (5/5) normal   Right Hip Extension (4/5) good (4+/5) good plus (4+/5) good plus (4+/5) good plus (5/5) normal   Left Hip Extension (4+/5) good plus (5/5) normal   (5/5) normal   Right Hip ABD (4-/5) good minus (4/5) good (4+/5) good plus (4+/5) good plus (5/5) normal   Left Hip ABD (4+/5) good plus (5/5) normal   (5/5) normal   Right Hip ADD (4/5) good (4/5) good (4+/5) good plus (5/5) normal (5/5) normal   Left Hip ADD (5/5) normal    (5/5) normal   Right Hip IR  (4/5) good (4+/5) good plus (5/5) normal (5/5) normal   Right Hip ER (4-/5) good minus (4/5) good (4+/5) good plus (4+/5) good plus (4+/5) good plus   Left Hip ER (4+/5) good plus (5/5) normal   (5/5) normal   Right Knee Flexion (4-/5) good minus (4/5) good (4/5) good (4+/5) good plus (4+/5) good plus   Left Knee Flexion (5/5) normal (5/5) normal   (5/5) normal   Right Knee Extension (4-/5) good minus (4/5) good (4+/5) good plus (5/5) normal (5/5) normal   Left Knee Extension (5/5) normal (5/5) normal   (5/5) normal   Right Ankle DF (4+/5) good plus (4+/5) good plus (5/5) normal (5/5) normal (5/5) normal   Left Ankle DF (5/5) normal (5/5) normal   (5/5) normal   Right Ankle PF (4/5) good (4/5) good (4/5) good (4+/5) good plus (5/5) normal   Left Ankle PF (5/5) normal (5/5) normal   (5/5) normal     Outcome  "Measures          4/24/2024    10:00 5/29/2024    09:00 6/24/2024    10:15 7/17/2024    09:10 8/12/2024    09:00   PT OBJECTIVE Outcome Measures   6 Minute Walk Test    1708 feet 1970 feet   10 Meter Walk Test 11.5 meters/sec 7.08 meters/sec  6.6 meters/sec    Gait Speed (m/sec) 0.87 m/sec       with Hurrycane 1.41 m/sec  1.52 m/sec    PT SUBJECTIVE Outcome Measures   Other WOMAC score= 46/96= 48%; Sitting tolerance unlimited, standing tolerance 10 minutes; walking tolerance 10-15 miutes WOMAC= 29/96= 30%; Standing tolerance 10 min; walking tolerance= 15 min WOMAC= 31/96= 32%; Standing tolerance 15 min; walking tolerance 15 min WOMAC= 12/96= 12.5%; Standing tolerance 30 min; walking tolerance 20 min WOMAC=13/96= 13.5%; standing tolerance= 30 min; walking tolerance= 30 min       Today's Treatment:    Education provided:  Yes: See treatment log for details of education provided    PT FLOWSHEET  Renee Valentine  \"Soumya\"    s/p R TKA  Performed PT Exercises Current Session Time    HOT PACK/COLD PACK  CPT 73389   TOTAL TIME FOR SESSION Not performed    Ice Ice R knee 10 min at end of session non billed    Heat     THER EX  CPT 75068 TOTAL TIME FOR SESSION   25  Minutes    Updated HEP     MOBILIZING     Heel slides 10 with strap    Term knee ext    y Hamstring stretch 3 x 30 sec longsitting    IT band stretch    y Gastroc stretch on board 3 x 30 sec   y Prone quad stretch 4 x 20 sec    Modified Harshil stretch     STRENGTHENING          Ankle pumps     SLR with quad set on hands 2x10 with 3# weight    clamshells 2 x 10 orange band         Single leg bridging 10x3  cues to butt squeeze    Seated LAQ 10 manual resistence conn    Prone hip extensions 10x2 prone with R leg over edge of bed    Prone knee flexion 10x with OTB con/ecc    90-90's 10x2 orange band    Hamstring curls on large blue ball 2x10    Hip abduction in sidelying 10x2 orange band   y Terminal knee extension with T band 10x 2 blue t-band   y TKE with BTB " with opp march 2 x 10    Sidelying donkey Kick 2x10 B    Hip abduction at wall  Press L leg into ball standing on R x10    Standing:   HR/TR     Single leg heel raises x 20, b/l toe raises x20   Y  Y  y     Forward step up repeaters   Lateral step up  Step down, heel touch only 8 inch 2 x 10  repeaters with opp march  8 inch 2 x 10 R with opp abd  6 inch 2 x 10 no RUE support (tried 8 inch, but not able without compensation)    Squats  2x10 on Airex              Single leg stance with taps in front and back (slight stance knee flexion) 20         4 way kicks in SLS 2 x 10 B with GTB    CARDIOVASCULAR     y Recumbent bike x 10 min Seat 9 level 7    Treadmill     NEURO RE-ED  CPT 56504 TOTAL TIME FOR SESSION Not performed    Static balance 30 sec floor, 30 sec foam  SLS with ball toss on floor 1 x 10 , then on foam 2 x 10    Resisted walking with yellow power cord Fwd and bkwd 150 feet each moderate amount of tension on yellow cord    Dynamic balance Lunge walking 20 feet x 4  High marching 30 feet x 2 with chest press with ball  Sidestepping 25 feet x 4 with BTB at knees    Tandem walking 25 feet x 2    GAIT TRAINING  CPT  35426  0 min    Working on heel strike and pushoff       stairs Ascend reciprocal with rail, descend step to with rail     MANUAL  CPT 39626 TOTAL TIME FOR SESSION 5 min     y         STM  IASTM to R ITBand distally    Stretching PROM flexion and extension  Joint mobs IT band, quads and hamstrings  5 min     0 - 128    Patellar mobs; Tibio-femural posterior glides       THER ACT  CPT 89589 TOTAL TIME FOR SESSION  30 min   yes Assessment for progress note 8/12/2024- WOMAC, MMT, ROM, 6 MWT    yes Reviewed HEP  8/12/2024-Issued and reviewed and updated HEP    yes                               Patient Education Discussed findings on evaluation including anatomy involved and POC moving forward.   Adjusted the height of hurry cane.  Patient educated in the importance of elevation of RLE above her heart  ad to use ice 10 min at a time throughout the day. Also discussed sleeping positions for increased comfort. Scar massage education and demonstration 5/6/24 7/17/2024- Discussed findings on assessment and progress towards goals, Renewing  POC for 1x/week x 4-6 weeks moving forward with patient doing gym workouts on her own in between. Patient verbalized understanding and agreement.  8/12/2024- Discussed objective findings ad meeting goals as well as plan to continue gym workouts. Patient verbalized understanding.             Goals Addressed                      This Visit's Progress       Patient Stated      COMPLETED: <enter goal here> (pt-stated)         The patient 's goals are to return to walking outdoors and to the exercises classes at the gym.--met         Other      COMPLETED: Mutually agreed upon pain goal         Mutually agreed upon pain goal: Decrease pain to 0-2/10 at worst R knee--met        COMPLETED: PT RW STG and LTG         Short Term Goal Time Frame Achieved Comment   Pt will increase R knee PROM by >/= 15 degrees 4 weeks met    Pt will increase R knee AROM by >/= 10 degrees for improved functional motion with stair negotiation 4 weeks  met     Pt will increase RLE MMT by 1/2 grade or more for improved functional strength with weight bearing activities 4 weeks  met     Pt will score 35% or better on the WOMAC for improved tolerance in daily functional ambulation and stair negotiation 4 weeks  met  5/29= 30%   Pt will be supervision with HEP 4 weeks met    Pt will decrease pain of R knee to </ 3/10 at worst for improved daily tolerance with weight bearing activities 4 weeks met         Long Term Goal Time Frame Achieved Comment   Pt will demonstrate R knee AROM to WNL all planes to allow improved motion with daily functional ambulation, stair negotiation, squatting 8 -12 weeks  met  8/12/24= 0-130* passively, 0-126* actively   Pt will demonstrate 5/5 MMT of RLE  to improve functional strength with  daily weight bearing activities 8-12 weeks met  4+to5/5   Pt will score 20% or better on WOMAC for improved functional tolerance with ambulation and stair negotiation  8-12weeks  met  7/17/2024= 12.5%   Pt will perform walking outdoors and return too gym classes without limitation  8-12weeks met    Pt will be independent with HEP  8-12weeks met    Patient will ambulate on all surfaces without AD with normalized  gait pattern and gait speed >/= 1.5 m/sec 8-12 weeks met Met for gait speed= 1.52 m/sec   6MWT will improve to 1765 feet with good gait mechanics to meet age related norm. 4-6 weeks met 8/12/2024= 1970 feet   Pt will demonstrate </=2/10 R knee pain to improve tolerance with weight bearing activities 8 -12weeks met Average 0-1/10  Worst 2/10

## 2024-09-17 ENCOUNTER — APPOINTMENT (OUTPATIENT)
Dept: URBAN - METROPOLITAN AREA CLINIC 203 | Age: 59
Setting detail: DERMATOLOGY
End: 2024-09-26

## 2024-09-17 DIAGNOSIS — L57.0 ACTINIC KERATOSIS: ICD-10-CM

## 2024-09-17 PROCEDURE — OTHER LIQUID NITROGEN: OTHER

## 2024-09-17 PROCEDURE — 17000 DESTRUCT PREMALG LESION: CPT

## 2024-09-17 ASSESSMENT — PAIN INTENSITY VAS: HOW INTENSE IS YOUR PAIN 0 BEING NO PAIN, 10 BEING THE MOST SEVERE PAIN POSSIBLE?: NO PAIN

## 2024-09-17 ASSESSMENT — LOCATION SIMPLE DESCRIPTION DERM: LOCATION SIMPLE: LEFT FOREHEAD

## 2024-09-17 ASSESSMENT — LOCATION DETAILED DESCRIPTION DERM: LOCATION DETAILED: LEFT FOREHEAD

## 2024-09-17 ASSESSMENT — LOCATION ZONE DERM: LOCATION ZONE: FACE

## 2024-09-17 NOTE — PROCEDURE: LIQUID NITROGEN
Number Of Freeze-Thaw Cycles: 3 freeze-thaw cycles
Consent: The patient's consent was obtained including but not limited to risks of crusting, scabbing, blistering, scarring, darker or lighter pigmentary change, recurrence, incomplete removal and infection.
Show Applicator Variable?: Yes
Render Post-Care Instructions In Note?: no
Detail Level: Detailed
Post-Care Instructions: I reviewed with the patient in detail post-care instructions. Patient is to wear sunprotection, and avoid picking at any of the treated lesions. Pt may apply Vaseline to crusted or scabbing areas.
Duration Of Freeze Thaw-Cycle (Seconds): 0

## 2024-09-23 ENCOUNTER — TRANSCRIBE ORDERS (OUTPATIENT)
Dept: REGISTRATION | Facility: REHABILITATION | Age: 59
End: 2024-09-23

## 2024-09-23 DIAGNOSIS — M75.02 ADHESIVE CAPSULITIS OF LEFT SHOULDER: Primary | ICD-10-CM

## 2024-09-25 ENCOUNTER — HOSPITAL ENCOUNTER (OUTPATIENT)
Dept: OCCUPATIONAL THERAPY | Facility: REHABILITATION | Age: 59
Setting detail: THERAPIES SERIES
Discharge: HOME | End: 2024-09-25
Attending: PHYSICIAN ASSISTANT
Payer: COMMERCIAL

## 2024-09-25 DIAGNOSIS — M75.02 ADHESIVE CAPSULITIS OF LEFT SHOULDER: Primary | ICD-10-CM

## 2024-09-25 PROCEDURE — 97110 THERAPEUTIC EXERCISES: CPT | Mod: GP

## 2024-09-25 PROCEDURE — 97140 MANUAL THERAPY 1/> REGIONS: CPT | Mod: GP

## 2024-09-25 PROCEDURE — 97162 PT EVAL MOD COMPLEX 30 MIN: CPT | Mod: GP

## 2024-09-25 NOTE — LETTER
Dear DR. Yeung,    Thank you for this referral. Please review the attached notes and plan of care for your approval.  Please contact our department with any questions.     Sincerely,     Xochitl Rucker, PT  [address] [address]  Phone [phone]  Fax  [phone]    By co-signing this Plan of Care (POC) you agree to the following:  I have reviewed the the Plan of Care established by the therapist within this document and certify that the services are skilled and medically necessary. I have reviewed the plan and recommend that these services continue to meet the goals stated in this document.    PHYSICIAN SIGNATURE: __________________________________     DATE: ___________________  TIME: _____________           Physical Therapy Plan of Care 24   Effective from: 2024  Effective to: 2024    Plan ID: [id number]            Participants as of Finalize on 2024    Name Type Comments Contact Info    ANGELO Daly Referring Provider  [phone]    Xochitl Rucker, PT Physical Therapist         Last Progress Notes Note     Author: Xochitl Rucker, PT Status: Signed Last edited: 2024  9:00 AM       Physical Therapy Evaluation    [facility] Fax: [phone]    PT EVALUATION FOR OUTPATIENT THERAPY    Patient: Renee Bustamante    MRN: [id number]  : 1965 58 y.o.     Referring Physician: Harshil Yeung PA*  Date of Visit: 2024      Certification Dates:  24 through 24         Recommended Frequency & Duration:  1 time/week for up to 8 weeks     Diagnosis:   1. Adhesive capsulitis of left shoulder        Chief Complaints:   Chief Complaint   Patient presents with   • Dec ROM   • Dec Strength   • Pain     L shoulder   • Decreased Endurance   • Decreased recreational/play activity   •  Difficulty Performing Work/school Tasks       Precautions: no known precautions/restrictions  Precautions additional comments:      Past  Medical History: History reviewed. No pertinent past medical history.    Past Surgical History: No past surgical history on file.      LEARNING ASSESSMENT    Assessment completed:  Yes    Learner name:  Tiffanie Bustamante    Learner: Patient    Learning Barriers:  Learning barriers: No Barriers    Preferred Language: English     Needed: No    Education Provided:   Method: Discussion  Readiness: acceptance  Response: Demonstrated understanding      CO-LEARNER ASSESSMENT:    Completed: No      Welcome letter discussed: Yes Patient provided with Welcome Letter, which includes attendance policy. Provided education regarding cancellation and no-show policy. Education regarding the importance of participation and regular attendance to maximize goal attainment.       OBJECTIVE MEASUREMENTS/DATA:    Time In Session:  Start Time: 0900  Stop Time: 1000  Time Calculation (min): 60 min   Assessment and Plan - 09/25/24 2201          Assessment    Plan of Care reviewed and patient/family in agreement Yes     System Pathology/Pathophysiology Noted musculoskeletal;neuromuscular     Functional Limitations in Following Categories (PT Eval) self-care;home management;work;community/leisure     Rehab Potential/Prognosis good, to achieve stated therapy goals     Problem List decreased endurance;decreased ROM;decreased strength;pain     Clinical Assessment Tiffanie Bustamante returns to  with a script to treat adhesive capsuittis L shoulder. She presents with painful L shoulder 1/10 to 5/10 at worst with decreased ROM and strength, and mild postural deviations. Her Jameel Shoulder score= 54/100, indicating moderate disability due to L shoulder adhesve capsulitis. Special tests indicate +impingement syndrome with supraspinatus and biceps tendons TTP. She will benefit from OPPT to address these impairments and improve the functional use of her LUE.     Plan and Recommendations Begin with manual treatment followed by stretches, joint  mobs and PROM L shoulder. Ad scapular stabilization as tolerated.     Planned Services CPT 22529 Manual therapy;CPT 02235 Neuromuscular Reeducation;CPT 70356 Therapeutic activities;CPT 46305 Therapeutic exercises;CPT 71654 Electrical stimulation UNATTENDED;CPT 08723 Hot/Cold Packs therapy                    General Information - 09/25/24 0901          Session Details    Document Type initial evaluation     Mode of Treatment individual therapy        General Information    Onset of Illness/Injury or Date of Surgery --   May 2024    Referring Physician ANGELO Nunn,C     History of present illness/functional impairment This 58 year old patient reports L shoulder pain which began gradually  after her knee surgery in May 2024. She saw Ortho MD last week who did xray which was normal. He gave her a cortisone injection in the L shoulder and ordered PT.     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     Existing Precautions/Restrictions no known precautions/restrictions                    Pain/Vitals - 09/25/24 1157          Pain Assessment    Currently in pain Yes     Preferred Pain Scale number (Numeric Rating Pain Scale)     Pain Side/Orientation left     Pain: Body location Shoulder     Pain Rating (0-10): Pre Activity 2     Pain Rating (0-10): Activity 5     Pain Rating (0-10): Post Activity 2        Pre Activity Vital Signs    Pulse 72     /57     BP Location Left upper arm     BP Method Automatic     Patient Position Sitting        Pain Intervention    Intervention  Lencho,STM, TE     Post Intervention Comments no change                    Falls/Food Screening - 09/25/24 0901          Initial Falls Assessment    One or more falls in the last year No        Food Insecurity    Within the past 12 months, you worried that your food  would run out before you got the money to buy more. Never true     Within the past 12 months, the food you bought just didn't last and you didn't have money to get more. Never true                    PT - 09/25/24 0901          Physical Therapy    Physical Therapy [facility]        PT Plan    Frequency of treatment 1 time/week     PT Duration 8 weeks     PT Cert From 09/25/24     PT Cert To 11/24/24     Date PT POC was sent to provider 09/25/24     Signed PT Plan of Care received?  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                   Sensory Tests    Sensory Testing - 09/25/24 2201          Sensory Assessment    Sensory Assessment sensation intact, upper extremities                   ROM    Range of Motion - 09/25/24 0900          RIGHT: Upper Extremity PROM Assessment    Scapular Range of Motion Impairment WNLs        LEFT: Upper Extremity PROM Assessment    Shoulder Flexion   145 degrees     Shoulder Extension   45 degrees     Shoulder Abduction   122 degrees     Shoulder Internal Rotation   38 degrees     Shoulder External Rotation   48 degrees        LEFT: Upper Extremity AROM Assessment    Shoulder Flexion   140 degrees     Shoulder Extension   45 degrees     Shoulder Abduction   100 degrees     Shoulder Internal Rotation   28 degrees     Shoulder External Rotation   45 degrees        RIGHT: Upper Extremity AROM Assessment    Right UE AROM normal WNL                   MMT    Manual Muscle Tests - 09/25/24 0900          RIGHT: Upper Extremity Manual Muscle Test Assessment    Shoulder Flexion gross movement (5/5) normal     Shoulder Extension gross movement (5/5) normal     Shoulder Abduction gross movement (5/5) normal     Shoulder Internal Rotation gross movement (5/5) normal     Shoulder External  Rotation gross movement (5/5) normal        LEFT: Upper Extremity Manual Muscle Test Assessment    Shoulder Flexion gross movement (4/5) good     Shoulder Extension gross movement (4+/5) good plus     Shoulder Adduction gross movement (4/5) good     Shoulder Abduction gross movement (4/5) good     Shoulder Internal Rotation gross movement (4/5) good     Shoulder External Rotation gross movement (4-/5) good minus                   Palpation    Palpation - 09/25/24 2201          Palpation    UE Palpation  Patient TTP Lbiceps, supraspinatus with tightness L upper trap                   Ortho Special Tests    Orthopedic Special Tests - 09/25/24 0900          Shoulder    Drop Arm Left - Negative;Right - Negative     Empty Can Left - Negative;Right - Negative     Celio-Zackery Left - Positive     [place] Left - Negative     Neer Left - Positive     Speed Left - Positive     Yergasons Left - Negative     HornBlower Sign Left - Negative     Lift-off Test Left - Positive                   Balance/Posture    Balance and Posture - 09/25/24 2201          Postural Deviations    Comment, Postural Deviations Patient with forward head , rounded shoulders with L shoulder elevated, mild thoracic kyphosis        Posture    Posture postural deviations                   Outcome Measures    PT Outcome Measures - 09/25/24 2201          Subjective Outcome Measures    Jameel Shoulder Score 0.54                      Goals          Patient Stated    •  <enter goal here> (pt-stated)       The patient's goals are to return to gym workouts and lifting things without pain         Other    •  Mutually agreed upon pain goal     •  RW shoulder STG and LTG       Short Term Goal Time Frame Comment Achieved   Pt will increase L shldPROM by 15 degrees 3-4 weeks     Pt will increase L shld AROM by 15 degrees for improved functional motion 3-4 weeks       Pt will increase LUE MMT by 1/2 grade or more for improved functional strength 3-4 weeks       Pt  will score 64 or better on the Jameel Shoulder score for improved tolerance in daily functional activities 3-4 weeks       Pt will be supervision with HEP 3-4 weeks     Pt will decrease pain of L shoulder to 2-3/10 at worst 3-4 weeks        Long Term Goal Time Frame Comment Achieved   Pt will demonstrate L shoulder AROM to WNL all planes to allow improved motion with daily functional activities 6-8 weeks       Pt will demonstrate 5/5 MMT of LUE to improve functional strength with daily activities 6-8 weeks       Pt will score 75/100 or better on Jameel Shoulder score for improved functional tolerance 6-8 weeks       Pt will perform gym workouts and cooking without limitation 6-8 weeks     Pt will be independent with HEP 6-8 weeks     Pt will demonstrate 0-2/10 L shoulder pain at worst 6-8 weeks                      TREATMENT PLAN:    name Tiffanie Bustamante     Dx Adhesive capsulitis L shld           Y/G Exercises Current Session Time    THER ACT  CPT 12195 TOTAL TIME FOR SESSION 8-22 Minutes   y Patient Education Patient educated in shoulder biomechanics and anatomy involved. Educated in  findings on eval and POC moving forward. She verbalized understanding.   y HEP Issued beginning HEP     THER EX CPT 68381 TOTAL TIME FOR SESSION 8-22 Minutes           STRETCHING    y Supine Cane Overhead 1 x 10 5 sec  hold   y Posteror capsule stretch 10 sec x 4    Supine Cane Circles      y Supine Cane Ext Rotation 1 x 10 5 sec hold    Towel Internal Rot Stretch     Pulleys Shoulder Flex     Pulleys Shoulder Abd     Doorway Pec Stretch     Wall Walk Shoulder Flex     Wall Walk Shoulder Abd                          STRENGTHENING    y Shoulder Blade Squeeze 5 sec hold 1 x 10    Shoulder Flex     Shoulder Abd     Shoulder Internal Rotation     Shoulder External Rotation     Shoulder Retractions     Shoulder Extension     No Money     TYI               NEURO RE-ED CPT 03553   TOTAL TIME FOR SESSION Not performed          MANUAL CPT 51998  TOTAL TIME FOR SESSION 8-22 Minutes   y STM L upper trap, biceps, supraspinatus    y Joint Mobs Glenohumeral jnt mobs/PROM L shoilder           MODALITIES  Heat/ice CPT 96003   TOTAL TIME FOR SESSION Not performed    US  CPT 87237      E-stim CPT 61761                 ASSESSMENT:    This 58 y.o. year old female presents to PT with above stated diagnosis. Physical Therapy evaluation reveals decreased endurance, decreased ROM, decreased strength, pain resulting in self-care, home management, work, community/leisure limitations. Renee Bustamante will benefit from skilled PT services to address limitations, work towards rehab and patient goals and maximize PLOF of chosen ADLs.     Planned Services: The patient's treatment will include CPT 43199 Manual therapy, CPT 74283 Neuromuscular Reeducation, CPT 06803 Therapeutic activities, CPT 69697 Therapeutic exercises, CPT 46201 Electrical stimulation UNATTENDED, CPT 58480 Hot/Cold Packs therapy, .     Xochitl Rucker, PT                           Current Participants as of 9/25/2024    Name Type Comments Contact Info    ANGELO Daly Referring Provider  [phone]    Signature pending    Xochitl Rucker, PT Physical Therapist      Signature pending

## 2024-09-26 ENCOUNTER — HOSPITAL ENCOUNTER (OUTPATIENT)
Dept: RADIOLOGY | Facility: HOSPITAL | Age: 59
Discharge: HOME | End: 2024-09-26
Attending: SPECIALIST
Payer: COMMERCIAL

## 2024-09-26 ENCOUNTER — TRANSCRIBE ORDERS (OUTPATIENT)
Dept: REGISTRATION | Facility: HOSPITAL | Age: 59
End: 2024-09-26

## 2024-09-26 DIAGNOSIS — Z12.31 ENCOUNTER FOR SCREENING MAMMOGRAM FOR MALIGNANT NEOPLASM OF BREAST: Primary | ICD-10-CM

## 2024-09-26 DIAGNOSIS — Z12.31 ENCOUNTER FOR SCREENING MAMMOGRAM FOR MALIGNANT NEOPLASM OF BREAST: ICD-10-CM

## 2024-09-26 PROCEDURE — 77067 SCR MAMMO BI INCL CAD: CPT

## 2024-09-26 NOTE — PROGRESS NOTES
Physical Therapy Evaluation    [facility] Fax: [phone]    PT EVALUATION FOR OUTPATIENT THERAPY    Patient: Renee Bustamante    MRN: [id number]  : 1965 58 y.o.     Referring Physician: Harshil Yeung PA*  Date of Visit: 2024      Certification Dates:  24 through 24         Recommended Frequency & Duration:  1 time/week for up to 8 weeks     Diagnosis:   1. Adhesive capsulitis of left shoulder        Chief Complaints:   Chief Complaint   Patient presents with    Dec ROM    Dec Strength    Pain     L shoulder    Decreased Endurance    Decreased recreational/play activity     Difficulty Performing Work/school Tasks       Precautions: no known precautions/restrictions  Precautions additional comments:      Past Medical History: History reviewed. No pertinent past medical history.    Past Surgical History: No past surgical history on file.      LEARNING ASSESSMENT    Assessment completed:  Yes    Learner name:  Tiffanie Bustamante    Learner: Patient    Learning Barriers:  Learning barriers: No Barriers    Preferred Language: English     Needed: No    Education Provided:   Method: Discussion  Readiness: acceptance  Response: Demonstrated understanding      CO-LEARNER ASSESSMENT:    Completed: No      Welcome letter discussed: Yes Patient provided with Welcome Letter, which includes attendance policy. Provided education regarding cancellation and no-show policy. Education regarding the importance of participation and regular attendance to maximize goal attainment.       OBJECTIVE MEASUREMENTS/DATA:    Time In Session:  Start Time: 0900  Stop Time: 1000  Time Calculation (min): 60 min   Assessment and Plan - 24 2201          Assessment    Plan of Care reviewed and patient/family in agreement Yes     System Pathology/Pathophysiology Noted musculoskeletal;neuromuscular     Functional Limitations in Following Categories (PT Eval) self-care;home  management;work;community/leisure     Rehab Potential/Prognosis good, to achieve stated therapy goals     Problem List decreased endurance;decreased ROM;decreased strength;pain     Clinical Assessment Tiffanie Bustamante returns to  with a script to treat adhesive capsuittis L shoulder. She presents with painful L shoulder 1/10 to 5/10 at worst with decreased ROM and strength, and mild postural deviations. Her Jameel Shoulder score= 54/100, indicating moderate disability due to L shoulder adhesve capsulitis. Special tests indicate +impingement syndrome with supraspinatus and biceps tendons TTP. She will benefit from OPPT to address these impairments and improve the functional use of her LUE.     Plan and Recommendations Begin with manual treatment followed by stretches, joint mobs and PROM L shoulder. Ad scapular stabilization as tolerated.     Planned Services CPT 55492 Manual therapy;CPT 61436 Neuromuscular Reeducation;CPT 55864 Therapeutic activities;CPT 57835 Therapeutic exercises;CPT 45038 Electrical stimulation UNATTENDED;CPT 29408 Hot/Cold Packs therapy                    General Information - 09/25/24 0901          Session Details    Document Type initial evaluation     Mode of Treatment individual therapy        General Information    Onset of Illness/Injury or Date of Surgery --   May 2024    Referring Physician ANGELO Nunn C     History of present illness/functional impairment This 58 year old patient reports L shoulder pain which began gradually  after her knee surgery in May 2024. She saw Ortho MD last week who did xray which was normal. He gave her a cortisone injection in the L shoulder and ordered PT.     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     Existing Precautions/Restrictions no known precautions/restrictions                     Pain/Vitals - 09/25/24 1157          Pain Assessment    Currently in pain Yes     Preferred Pain Scale number (Numeric Rating Pain Scale)     Pain Side/Orientation left     Pain: Body location Shoulder     Pain Rating (0-10): Pre Activity 2     Pain Rating (0-10): Activity 5     Pain Rating (0-10): Post Activity 2        Pre Activity Vital Signs    Pulse 72     /57     BP Location Left upper arm     BP Method Automatic     Patient Position Sitting        Pain Intervention    Intervention  Eval,STM, TE     Post Intervention Comments no change                    Falls/Food Screening - 09/25/24 0901          Initial Falls Assessment    One or more falls in the last year No        Food Insecurity    Within the past 12 months, you worried that your food would run out before you got the money to buy more. Never true     Within the past 12 months, the food you bought just didn't last and you didn't have money to get more. Never true                    PT - 09/25/24 0901          Physical Therapy    Physical Therapy [facility] PT        PT Plan    Frequency of treatment 1 time/week     PT Duration 8 weeks     PT Cert From 09/25/24     PT Cert To 11/24/24     Date PT POC was sent to provider 09/25/24     Signed PT Plan of Care received?  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                   Sensory Tests    Sensory Testing - 09/25/24 2201          Sensory Assessment    Sensory Assessment sensation intact, upper extremities                   ROM    Range of Motion - 09/25/24 0900          RIGHT: Upper Extremity PROM Assessment    Scapular Range of Motion Impairment WNLs        LEFT: Upper Extremity PROM Assessment    Shoulder Flexion   145 degrees     Shoulder Extension   45 degrees     Shoulder  Abduction   122 degrees     Shoulder Internal Rotation   38 degrees     Shoulder External Rotation   48 degrees        LEFT: Upper Extremity AROM Assessment    Shoulder Flexion   140 degrees     Shoulder Extension   45 degrees     Shoulder Abduction   100 degrees     Shoulder Internal Rotation   28 degrees     Shoulder External Rotation   45 degrees        RIGHT: Upper Extremity AROM Assessment    Right UE AROM normal WNL                   MMT    Manual Muscle Tests - 09/25/24 0900          RIGHT: Upper Extremity Manual Muscle Test Assessment    Shoulder Flexion gross movement (5/5) normal     Shoulder Extension gross movement (5/5) normal     Shoulder Abduction gross movement (5/5) normal     Shoulder Internal Rotation gross movement (5/5) normal     Shoulder External Rotation gross movement (5/5) normal        LEFT: Upper Extremity Manual Muscle Test Assessment    Shoulder Flexion gross movement (4/5) good     Shoulder Extension gross movement (4+/5) good plus     Shoulder Adduction gross movement (4/5) good     Shoulder Abduction gross movement (4/5) good     Shoulder Internal Rotation gross movement (4/5) good     Shoulder External Rotation gross movement (4-/5) good minus                   Palpation    Palpation - 09/25/24 2201          Palpation    UE Palpation  Patient TTP Lbiceps, supraspinatus with tightness L upper trap                   Ortho Special Tests    Orthopedic Special Tests - 09/25/24 0900          Shoulder    Drop Arm Left - Negative;Right - Negative     Empty Can Left - Negative;Right - Negative     Celio-Zackery Left - Positive     Massac Left - Negative     Neer Left - Positive     Speed Left - Positive     Yergasons Left - Negative     HornBlower Sign Left - Negative     Lift-off Test Left - Positive                   Balance/Posture    Balance and Posture - 09/25/24 2201          Postural Deviations    Comment, Postural Deviations Patient with forward head , rounded shoulders with L  shoulder elevated, mild thoracic kyphosis        Posture    Posture postural deviations                   Outcome Measures    PT Outcome Measures - 09/25/24 2201          Subjective Outcome Measures    Weatherford Shoulder Score 0.54                      Goals          Patient Stated      <enter goal here> (pt-stated)       The patient's goals are to return to gym workouts and lifting things without pain         Other      Mutually agreed upon pain goal       RW shoulder STG and LTG       Short Term Goal Time Frame Comment Achieved   Pt will increase L shldPROM by 15 degrees 3-4 weeks     Pt will increase L shld AROM by 15 degrees for improved functional motion 3-4 weeks       Pt will increase LUE MMT by 1/2 grade or more for improved functional strength 3-4 weeks       Pt will score 64 or better on the Weatherford Shoulder score for improved tolerance in daily functional activities 3-4 weeks       Pt will be supervision with HEP 3-4 weeks     Pt will decrease pain of L shoulder to 2-3/10 at worst 3-4 weeks        Long Term Goal Time Frame Comment Achieved   Pt will demonstrate L shoulder AROM to WNL all planes to allow improved motion with daily functional activities 6-8 weeks       Pt will demonstrate 5/5 MMT of LUE to improve functional strength with daily activities 6-8 weeks       Pt will score 75/100 or better on Jameel Shoulder score for improved functional tolerance 6-8 weeks       Pt will perform gym workouts and cooking without limitation 6-8 weeks     Pt will be independent with HEP 6-8 weeks     Pt will demonstrate 0-2/10 L shoulder pain at worst 6-8 weeks                      TREATMENT PLAN:    name Tiffanie Bustamante     Dx Adhesive capsulitis L shld           Y/G Exercises Current Session Time    THER ACT  CPT 08988 TOTAL TIME FOR SESSION 8-22 Minutes   y Patient Education Patient educated in shoulder biomechanics and anatomy involved. Educated in  findings on eval and POC moving forward. She verbalized understanding.    y HEP Issued beginning HEP     THER EX CPT 58113 TOTAL TIME FOR SESSION 8-22 Minutes           STRETCHING    y Supine Cane Overhead 1 x 10 5 sec  hold   y Posteror capsule stretch 10 sec x 4    Supine Cane Circles      y Supine Cane Ext Rotation 1 x 10 5 sec hold    Towel Internal Rot Stretch     Pulleys Shoulder Flex     Pulleys Shoulder Abd     Doorway Pec Stretch     Wall Walk Shoulder Flex     Wall Walk Shoulder Abd                          STRENGTHENING    y Shoulder Blade Squeeze 5 sec hold 1 x 10    Shoulder Flex     Shoulder Abd     Shoulder Internal Rotation     Shoulder External Rotation     Shoulder Retractions     Shoulder Extension     No Money     TYI               NEURO RE-ED CPT 95350   TOTAL TIME FOR SESSION Not performed          MANUAL CPT 97352 TOTAL TIME FOR SESSION 8-22 Minutes   y STM L upper trap, biceps, supraspinatus    y Joint Mobs Glenohumeral jnt mobs/PROM L shoilder           MODALITIES  Heat/ice CPT 09647   TOTAL TIME FOR SESSION Not performed    US  CPT 30142      E-stim CPT 47256                 ASSESSMENT:    This 58 y.o. year old female presents to PT with above stated diagnosis. Physical Therapy evaluation reveals decreased endurance, decreased ROM, decreased strength, pain resulting in self-care, home management, work, community/leisure limitations. Renee Bustamante will benefit from skilled PT services to address limitations, work towards rehab and patient goals and maximize PLOF of chosen ADLs.     Planned Services: The patient's treatment will include CPT 64787 Manual therapy, CPT 22151 Neuromuscular Reeducation, CPT 11589 Therapeutic activities, CPT 19749 Therapeutic exercises, CPT 92632 Electrical stimulation UNATTENDED, CPT 77592 Hot/Cold Packs therapy, .     Xochitl Rucker, PT

## 2024-09-26 NOTE — OP PT TREATMENT LOG
name Tiffanie Bustamante     Dx Adhesive capsulitis L shld           Y/G Exercises Current Session Time    THER ACT  CPT 87671 TOTAL TIME FOR SESSION 8-22 Minutes   y Patient Education Patient educated in shoulder biomechanics and anatomy involved. Educated in  findings on eval and POC moving forward. She verbalized understanding.   y HEP Issued beginning HEP     THER EX CPT 89264 TOTAL TIME FOR SESSION 8-22 Minutes           STRETCHING    y Supine Cane Overhead 1 x 10 5 sec  hold   y Posteror capsule stretch 10 sec x 4    Supine Cane Circles      y Supine Cane Ext Rotation 1 x 10 5 sec hold    Towel Internal Rot Stretch     Pulleys Shoulder Flex     Pulleys Shoulder Abd     Doorway Pec Stretch     Wall Walk Shoulder Flex     Wall Walk Shoulder Abd                          STRENGTHENING    y Shoulder Blade Squeeze 5 sec hold 1 x 10    Shoulder Flex     Shoulder Abd     Shoulder Internal Rotation     Shoulder External Rotation     Shoulder Retractions     Shoulder Extension     No Money     TYI               NEURO RE-ED CPT 60440   TOTAL TIME FOR SESSION Not performed          MANUAL CPT 38633 TOTAL TIME FOR SESSION 8-22 Minutes   y STM L upper trap, biceps, supraspinatus    y Joint Mobs Glenohumeral jnt mobs/PROM L shoilder           MODALITIES  Heat/ice CPT 00872   TOTAL TIME FOR SESSION Not performed    US  CPT 95009      E-stim CPT 04502

## 2024-10-04 ENCOUNTER — HOSPITAL ENCOUNTER (OUTPATIENT)
Dept: OCCUPATIONAL THERAPY | Facility: REHABILITATION | Age: 59
Setting detail: THERAPIES SERIES
Discharge: HOME | End: 2024-10-04
Attending: PHYSICIAN ASSISTANT
Payer: COMMERCIAL

## 2024-10-04 DIAGNOSIS — R26.9 GAIT ABNORMALITY: ICD-10-CM

## 2024-10-04 DIAGNOSIS — Z96.651 HISTORY OF TOTAL KNEE ARTHROPLASTY, RIGHT: ICD-10-CM

## 2024-10-04 DIAGNOSIS — M75.02 ADHESIVE CAPSULITIS OF LEFT SHOULDER: Primary | ICD-10-CM

## 2024-10-04 PROCEDURE — 97010 HOT OR COLD PACKS THERAPY: CPT | Mod: GP

## 2024-10-04 PROCEDURE — 97140 MANUAL THERAPY 1/> REGIONS: CPT | Mod: GP

## 2024-10-04 PROCEDURE — 97110 THERAPEUTIC EXERCISES: CPT | Mod: GP

## 2024-10-04 NOTE — OP PT TREATMENT LOG
name Tiffanie Bustamante     Dx Adhesive capsulitis L shld           Y/G Exercises Current Session Time    THER ACT  CPT 58193 TOTAL TIME FOR SESSION 0-7 Minutes   y Patient Education Patient educated in shoulder biomechanics and anatomy involved. Educated in  findings on eval and POC moving forward. She verbalized understanding.   y HEP Issued and reviewed updated HEP     THER EX CPT 23934 TOTAL TIME FOR SESSION 23-37 Minutes           STRETCHING    y Supine Cane Overhead 1 x 10 5 sec  hold   y Posteror capsule stretch 10 sec x 4   y Standing cane shld extension 1 x 10 5 sec hold    Supine Cane Circles      y Supine Cane Ext Rotation 1 x 10 5 sec hold   y Towel Internal Rot Stretch 1x 10   10sec hold   y Pulleys Shoulder Flex 1 x 10   y Pulleys Shoulder Abd 1 x 10    Doorway Pec Stretch    y Wall Walk Shoulder Flex 1 x 10    Wall Walk Shoulder Abd                          STRENGTHENING    y Shoulder Blade Squeeze 5 sec hold 1 x 10    Shoulder Flex     Shoulder Abd     Shoulder Internal Rotation     Shoulder External Rotation    y Shoulder Retractions GTB 2 x 10   y Shoulder Extension GTB 2 x 10   y No Money PTB 2 x 10    TYI               NEURO RE-ED CPT 72436   TOTAL TIME FOR SESSION Not performed          MANUAL CPT 56131 TOTAL TIME FOR SESSION 8-22 Minutes   y STM L upper trap, biceps, supraspinatus    y Joint Mobs/PROM L shld Glenohumeral jnt mobs/PROM L shoilder           MODALITIES  Heat/ice CPT 91493   TOTAL TIME FOR SESSION 8-22 Minutes    US  CPT 72166      E-stim CPT 09191       MH pre and CP post exercise

## 2024-10-04 NOTE — PROGRESS NOTES
Physical Therapy Visit    PT DAILY NOTE FOR OUTPATIENT THERAPY    Patient: Renee Bustamante MRN: [id number]  : 1965 58 y.o.  Referring Physician: Harshil Yeung PA*  Date of Visit: 10/4/2024    Certification Dates: 24 through 24    Diagnosis:   1. Adhesive capsulitis of left shoulder    2. History of total knee arthroplasty, right    3. Gait abnormality        Chief Complaints:  shoulder pain, decreased ROM and strength    Precautions:   Existing Precautions/Restrictions: no known precautions/restrictions      TODAY'S VISIT    Time In Session:  Start Time: 1005  Stop Time: 1105  Time Calculation (min): 60 min   History/Vitals/Pain/Encounter Info - 10/04/24 1011          Injury History/Precautions/Daily Required Info    Document Type daily treatment     Primary Therapist Xochitl Rucker PT     Chief Complaint/Reason for Visit  shoulder pain, decreased ROM and strength     Onset of Illness/Injury or Date of Surgery --   May 2024    Referring Physician ANGELO Nunn,C     Existing Precautions/Restrictions no known precautions/restrictions     History of present illness/functional impairment This 58 year old patient reports L shoulder pain which began gradually  after her knee surgery in May 2024. She saw Ortho MD last week who did xray which was normal. He gave her a cortisone injection in the L shoulder and ordered PT.     Patient/Family/Caregiver Comments/Observations Soumya reports her L shld pain is 0/10 at rest and 4/10 with reaching out to side quickly.        Pain Assessment    Currently in pain Yes     Preferred Pain Scale number (Numeric Rating Pain Scale)     Pain Side/Orientation left     Pain: Body location Shoulder     Pain Rating (0-10): Pre Activity 4     Pain Rating (0-10): Post Activity 2        Pain Intervention    Intervention  MH, MT, TE, CP     Post Intervention Comments decreased pain at end of hour                    Daily Treatment Assessment and Plan -  10/04/24 1011          Daily Treatment Assessment and Plan    Progress toward goals Progressing     Daily Outcome Summary Soumya was seen for her first follow up visit today. Began with MH followed by manual STM and PROM L shooulder with joint mobs. TE continued with addition of tband scapular stabilization and wall walking and towel stretch for IR for increased ROM. She tolerated session well and reports good compliance with HEP.     Plan and Recommendations Continue manual treatment followed by stretches, joint mobs and PROM L shoulder as well as  scapular stabilization as tolerated.                   Today's Treatment:    name Tiffanie Bustamante     Dx Adhesive capsulitis L shld           Y/G Exercises Current Session Time    THER ACT  CPT 13148 TOTAL TIME FOR SESSION 0-7 Minutes   y Patient Education Patient educated in shoulder biomechanics and anatomy involved. Educated in  findings on eval and POC moving forward. She verbalized understanding.   y HEP Issued and reviewed updated HEP     THER EX CPT 65482 TOTAL TIME FOR SESSION 23-37 Minutes           STRETCHING    y Supine Cane Overhead 1 x 10 5 sec  hold   y Posteror capsule stretch 10 sec x 4   y Standing cane shld extension 1 x 10 5 sec hold    Supine Cane Circles      y Supine Cane Ext Rotation 1 x 10 5 sec hold   y Towel Internal Rot Stretch 1x 10   10sec hold   y Pulleys Shoulder Flex 1 x 10   y Pulleys Shoulder Abd 1 x 10    Doorway Pec Stretch    y Wall Walk Shoulder Flex 1 x 10    Wall Walk Shoulder Abd                          STRENGTHENING    y Shoulder Blade Squeeze 5 sec hold 1 x 10    Shoulder Flex     Shoulder Abd     Shoulder Internal Rotation     Shoulder External Rotation    y Shoulder Retractions GTB 2 x 10   y Shoulder Extension GTB 2 x 10   y No Money PTB 2 x 10    TYI               NEURO RE-ED CPT 40492   TOTAL TIME FOR SESSION Not performed          MANUAL CPT 18091 TOTAL TIME FOR SESSION 8-22 Minutes   y STM L upper trap, biceps,  supraspinatus    y Joint Mobs/PROM L shld Glenohumeral jnt mobs/PROM L shoilder           MODALITIES  Heat/ice CPT 13597   TOTAL TIME FOR SESSION 8-22 Minutes      CPT 45332      E-stim CPT 94902        pre and CP post exercise

## 2024-10-09 ENCOUNTER — HOSPITAL ENCOUNTER (OUTPATIENT)
Dept: OCCUPATIONAL THERAPY | Facility: REHABILITATION | Age: 59
Setting detail: THERAPIES SERIES
Discharge: HOME | End: 2024-10-09
Attending: PHYSICIAN ASSISTANT
Payer: COMMERCIAL

## 2024-10-09 DIAGNOSIS — M75.02 ADHESIVE CAPSULITIS OF LEFT SHOULDER: Primary | ICD-10-CM

## 2024-10-09 PROCEDURE — 97110 THERAPEUTIC EXERCISES: CPT | Mod: GP

## 2024-10-09 PROCEDURE — 97140 MANUAL THERAPY 1/> REGIONS: CPT | Mod: GP

## 2024-10-09 NOTE — PROGRESS NOTES
Physical Therapy Visit    PT DAILY NOTE FOR OUTPATIENT THERAPY    Patient: Renee Bustamante MRN: [id number]  : 1965 58 y.o.  Referring Physician: Harshil Yeung PA*  Date of Visit: 10/9/2024    Certification Dates: 24 through 24    Diagnosis:   1. Adhesive capsulitis of left shoulder        Chief Complaints:  shoulder pain, decreased ROM and strength    Precautions:   Existing Precautions/Restrictions: no known precautions/restrictions      TODAY'S VISIT    Time In Session:  Start Time: 1005  Stop Time: 1105  Time Calculation (min): 60 min   History/Vitals/Pain/Encounter Info - 10/09/24 1010          Injury History/Precautions/Daily Required Info    Document Type daily treatment     Primary Therapist Xochitl Rucker PT     Chief Complaint/Reason for Visit  shoulder pain, decreased ROM and strength     Onset of Illness/Injury or Date of Surgery --   May 2024    Referring Physician ANGELO Nunn,C     Existing Precautions/Restrictions no known precautions/restrictions     History of present illness/functional impairment This 58 year old patient reports L shoulder pain which began gradually  after her knee surgery in May 2024. She saw Ortho MD last week who did xray which was normal. He gave her a cortisone injection in the L shoulder and ordered PT.     Patient/Family/Caregiver Comments/Observations Soumya reports no pain on arrival and she can sleep on her L side now. She reports most difficulty with IR stretch and pain 4/10 with reaching out to the side when in her bed.     Patient reported fall since last visit No        Pain Assessment    Currently in pain No/Denies     Preferred Pain Scale number (Numeric Rating Pain Scale)     Pain Side/Orientation left     Pain: Body location Shoulder     Pain Rating (0-10): Pre Activity 0     Pain Rating (0-10): Activity 4     Pain Rating (0-10): Post Activity 0        Pain Intervention    Intervention  MT, TE     Post Intervention  Comments no pain at the end of the hour                    Daily Treatment Assessment and Plan - 10/09/24 1010          Daily Treatment Assessment and Plan    Progress toward goals Progressing     Daily Outcome Summary Soumya began with active warm up followed by manual treatment with joint mobs and PROM L shld.  TE continued with addition of serratus punches and ball and wand circles as well as tband IR and ER. She also continued with pulleys and wall washing with excellent form. She tolerated session well with no pain at the end of the hour.    Plan and Recommendations Continue manual treatment followed by stretches, joint mobs and PROM L shoulder as well as  scapular stabilization and strengthening as tolerated.                       Today's Treatment:    name Tiffanie Bustamante     Dx Adhesive capsulitis L shld           Y/G Exercises Current Session Time    THER ACT  CPT 23756 TOTAL TIME FOR SESSION Not performed    Patient Education Patient educated in shoulder biomechanics and anatomy involved. Educated in  findings on eval and POC moving forward. She verbalized understanding.    HEP Issued and reviewed updated HEP     THER EX CPT 12547 TOTAL TIME FOR SESSION 38-52 Minutes           STRETCHING    y Supine Cane Overhead 1 x 10 5 sec  hold   y Posteror capsule stretch 10 sec x 4   y Standing cane shld extension 1 x 10 5 sec hold   y Supine Cane Circles 1 x 10 each direction     y Supine Cane Ext Rotation 1 x 10 5 sec hold   y Towel Internal Rot Stretch 1x 10   10sec hold   y Pulleys Shoulder Flex 1 x 10   y Pulleys Shoulder Abd 1 x 10    Doorway Pec Stretch    y Wall Walk Shoulder Flex 1 x 10    Wall Walk Shoulder Abd                          STRENGTHENING    y Shoulder Blade Squeeze 5 sec hold 1 x 10   y Serratus punches 2 x 10   y Ball circles at 90     Shoulder Flex     Shoulder Abd    y Shoulder Internal Rotation PTB 2 x 10   y Shoulder External Rotation PTB 2x 10   y Shoulder Retractions GTB 2 x 10   y  Shoulder Extension GTB 2 x 10   y No Money PTB 2 x 10    TYI               NEURO RE-ED CPT 29461   TOTAL TIME FOR SESSION Not performed          MANUAL CPT 74699 TOTAL TIME FOR SESSION 8-22 Minutes   y STM L upper trap, biceps, supraspinatus    y Joint Mobs/PROM L shld Glenohumeral jnt mobs/PROM L shoilder           MODALITIES  Heat/ice CPT 71012   TOTAL TIME FOR SESSION Not performed    US  CPT 54333      E-stim CPT 78665       MH pre and CP post exercise

## 2024-10-09 NOTE — ADDENDUM NOTE
Encounter addended by: Xochitl Rucker, PT on: 10/9/2024 5:30 PM   Actions taken: Clinical Note Signed

## 2024-10-09 NOTE — OP PT TREATMENT LOG
name Tiffanie Bustamante     Dx Adhesive capsulitis L shld           Y/G Exercises Current Session Time    THER ACT  CPT 95545 TOTAL TIME FOR SESSION Not performed    Patient Education Patient educated in shoulder biomechanics and anatomy involved. Educated in  findings on eval and POC moving forward. She verbalized understanding.    HEP Issued and reviewed updated HEP     THER EX CPT 31191 TOTAL TIME FOR SESSION 38-52 Minutes           STRETCHING    y Supine Cane Overhead 1 x 10 5 sec  hold   y Posteror capsule stretch 10 sec x 4   y Standing cane shld extension 1 x 10 5 sec hold   y Supine Cane Circles 1 x 10 each direction     y Supine Cane Ext Rotation 1 x 10 5 sec hold   y Towel Internal Rot Stretch 1x 10   10sec hold   y Pulleys Shoulder Flex 1 x 10   y Pulleys Shoulder Abd 1 x 10    Doorway Pec Stretch    y Wall Walk Shoulder Flex 1 x 10    Wall Walk Shoulder Abd                          STRENGTHENING    y Shoulder Blade Squeeze 5 sec hold 1 x 10   y Serratus punches 2 x 10   y Ball circles at 90     Shoulder Flex     Shoulder Abd    y Shoulder Internal Rotation PTB 2 x 10   y Shoulder External Rotation PTB 2x 10   y Shoulder Retractions GTB 2 x 10   y Shoulder Extension GTB 2 x 10   y No Money PTB 2 x 10    TYI               NEURO RE-ED CPT 66922   TOTAL TIME FOR SESSION Not performed          MANUAL CPT 03431 TOTAL TIME FOR SESSION 8-22 Minutes   y STM L upper trap, biceps, supraspinatus    y Joint Mobs/PROM L shld Glenohumeral jnt mobs/PROM L shoilder           MODALITIES  Heat/ice CPT 20189   TOTAL TIME FOR SESSION Not performed    US  CPT 37015      E-stim CPT 50270       MH pre and CP post exercise

## 2024-10-16 ENCOUNTER — HOSPITAL ENCOUNTER (OUTPATIENT)
Dept: OCCUPATIONAL THERAPY | Facility: REHABILITATION | Age: 59
Setting detail: THERAPIES SERIES
Discharge: HOME | End: 2024-10-16
Attending: PHYSICIAN ASSISTANT
Payer: COMMERCIAL

## 2024-10-16 DIAGNOSIS — R26.9 GAIT ABNORMALITY: ICD-10-CM

## 2024-10-16 DIAGNOSIS — M75.02 ADHESIVE CAPSULITIS OF LEFT SHOULDER: Primary | ICD-10-CM

## 2024-10-16 DIAGNOSIS — Z96.651 HISTORY OF TOTAL KNEE ARTHROPLASTY, RIGHT: ICD-10-CM

## 2024-10-16 PROCEDURE — 97110 THERAPEUTIC EXERCISES: CPT | Mod: GP

## 2024-10-16 PROCEDURE — 97140 MANUAL THERAPY 1/> REGIONS: CPT | Mod: GP

## 2024-10-16 NOTE — OP PT TREATMENT LOG
name Tiffanie Bustamante     Dx Adhesive capsulitis L shld           Y/G Exercises Current Session Time    THER ACT  CPT 00917 TOTAL TIME FOR SESSION Not performed    Patient Education Patient educated in shoulder biomechanics and anatomy involved. Educated in  findings on eval and POC moving forward. She verbalized understanding.   y HEP Issued and reviewed updated HEP     THER EX CPT 12727 TOTAL TIME FOR SESSION 38-52 Minutes           STRETCHING    y Supine Cane Overhead 1 x 10 5 sec  hold   y Posteror capsule stretch 10 sec x 4   y Standing cane shld extension 1 x 10 5 sec hold   y Supine Cane Circles 1 x 10 each direction     y Supine Cane Ext Rotation 1 x 10 5 sec hold   y Towel Internal Rot Stretch 1x 10   10sec hold   y Pulleys Shoulder Flex 1 x 10   y Pulleys Shoulder Abd 1 x 10   y Doorway Pec Stretch 10 sec x 5   y Wall Walk Shoulder Flex 1 x 10   y Wall Walk Shoulder Abd 1 x 10                         STRENGTHENING    y Shoulder Blade Squeeze 5 sec hold 1 x 10 (done prone)   y Serratus punches 2 x 10   y Ball circles at 90* 1 kg ball 1 x 10 each  cw/ccw    Shoulder Flex     Shoulder Abd    y Shoulder Internal Rotation PTB 2 x 10   y Shoulder External Rotation PTB 2x 10   y Shoulder Retractions BTB 2 x 10   y Shoulder Extension BTB 2 x 10   y No Money PTB 2 x 10   y Shouulder horizontal abduction OTB 2 x 10   y TYI Is and Ts only 1 x 10 each              NEURO RE-ED CPT 35057   TOTAL TIME FOR SESSION Not performed          MANUAL CPT 58764 TOTAL TIME FOR SESSION 8-22 Minutes   y STM L upper trap, biceps, supraspinatus    Y    y Joint Mobs/PROM L shld Glenohumeral jnt mobs/PROM L shoulder  Scapular mobs    y MFR Thoracic paraspinals     MODALITIES  Heat/ice CPT 91817   TOTAL TIME FOR SESSION Not performed    US  CPT 48413      E-stim CPT 65111       MH pre and CP post exercise

## 2024-10-16 NOTE — PROGRESS NOTES
Physical Therapy Visit    PT DAILY NOTE FOR OUTPATIENT THERAPY    Patient: Renee Butsamante MRN: [id number]  : 1965 58 y.o.  Referring Physician: Harshil Yeung PA*  Date of Visit: 10/16/2024    Certification Dates: 24 through 24    Diagnosis:   1. Adhesive capsulitis of left shoulder    2. History of total knee arthroplasty, right    3. Gait abnormality        Chief Complaints:  shoulder pain, decreased ROM and strength    Precautions:   Existing Precautions/Restrictions: no known precautions/restrictions      TODAY'S VISIT    Time In Session:  Start Time: 1000  Stop Time: 1100  Time Calculation (min): 60 min   History/Vitals/Pain/Encounter Info - 10/16/24 1002          Injury History/Precautions/Daily Required Info    Document Type daily treatment     Primary Therapist Xochitl Rucker, TALHA     Chief Complaint/Reason for Visit  shoulder pain, decreased ROM and strength     Onset of Illness/Injury or Date of Surgery --   May 2024    Referring Physician ANGELO Nunn,C     Existing Precautions/Restrictions no known precautions/restrictions     History of present illness/functional impairment This 58 year old patient reports L shoulder pain which began gradually  after her knee surgery in May 2024. She saw Ortho MD last week who did xray which was normal. He gave her a cortisone injection in the L shoulder and ordered PT.     Patient/Family/Caregiver Comments/Observations Soumya continues to report no pain at rest and 3/10 at worst with quick movement out to the side. She painted a door with no increased pain last week.     Patient reported fall since last visit No        Pain Assessment    Currently in pain No/Denies        Pain Intervention    Intervention  MT, TE     Post Intervention Comments no pain at end of session                    Daily Treatment Assessment and Plan - 10/16/24 1002          Daily Treatment Assessment and Plan    Progress toward goals Progressing     Daily  Outcome Summary Began with active warm up on UBE followed by manual treatment with addition of MFR to thoracic paraspinals and scapular mobs. TE continued with addition of sleeper stretch and doorway stretch for pects. Also worked on strengthening with addition of weighted ball for shld circles and tband horizontal abduction. Soumya tolerated all TE well and demonstrated increased IR ROM at the end of the hour     Plan and Recommendations Continue manual treatment followed by stretches, joint mobs and PROM L shoulder as well as  scapular stabilization and strengthening as tolerated.                       Today's Treatment:    name Tiffanie Bustamante     Dx Adhesive capsulitis L shld           Y/G Exercises Current Session Time    THER ACT  CPT 10414 TOTAL TIME FOR SESSION Not performed    Patient Education Patient educated in shoulder biomechanics and anatomy involved. Educated in  findings on eval and POC moving forward. She verbalized understanding.   y HEP Issued and reviewed updated HEP     THER EX CPT 76888 TOTAL TIME FOR SESSION 38-52 Minutes           STRETCHING    y Supine Cane Overhead 1 x 10 5 sec  hold   y Posteror capsule stretch 10 sec x 4   y Standing cane shld extension 1 x 10 5 sec hold   y Supine Cane Circles 1 x 10 each direction     y Supine Cane Ext Rotation 1 x 10 5 sec hold   y Towel Internal Rot Stretch 1x 10   10sec hold   y Pulleys Shoulder Flex 1 x 10   y Pulleys Shoulder Abd 1 x 10   y Doorway Pec Stretch 10 sec x 5   y Wall Walk Shoulder Flex 1 x 10   y Wall Walk Shoulder Abd 1 x 10                         STRENGTHENING    y Shoulder Blade Squeeze 5 sec hold 1 x 10 (done prone)   y Serratus punches 2 x 10   y Ball circles at 90* 1 kg ball 1 x 10 each  cw/ccw    Shoulder Flex     Shoulder Abd    y Shoulder Internal Rotation PTB 2 x 10   y Shoulder External Rotation PTB 2x 10   y Shoulder Retractions BTB 2 x 10   y Shoulder Extension BTB 2 x 10   y No Money PTB 2 x 10   y Shouulder  horizontal abduction OTB 2 x 10   y TYI Is and Ts only 1 x 10 each              NEURO RE-ED CPT 52827   TOTAL TIME FOR SESSION Not performed          MANUAL CPT 17032 TOTAL TIME FOR SESSION 8-22 Minutes   y STM L upper trap, biceps, supraspinatus    Y    y Joint Mobs/PROM L shld Glenohumeral jnt mobs/PROM L shoulder  Scapular mobs    y MFR Thoracic paraspinals     MODALITIES  Heat/ice CPT 54214   TOTAL TIME FOR SESSION Not performed    US  CPT 21680      E-stim CPT 19167       MH pre and CP post exercise

## 2024-10-23 ENCOUNTER — HOSPITAL ENCOUNTER (OUTPATIENT)
Dept: OCCUPATIONAL THERAPY | Facility: REHABILITATION | Age: 59
Setting detail: THERAPIES SERIES
Discharge: HOME | End: 2024-10-23
Attending: PHYSICIAN ASSISTANT
Payer: COMMERCIAL

## 2024-10-23 DIAGNOSIS — M75.02 ADHESIVE CAPSULITIS OF LEFT SHOULDER: Primary | ICD-10-CM

## 2024-10-23 PROCEDURE — 97110 THERAPEUTIC EXERCISES: CPT | Mod: GP

## 2024-10-23 PROCEDURE — 97140 MANUAL THERAPY 1/> REGIONS: CPT | Mod: GP

## 2024-10-23 NOTE — OP PT TREATMENT LOG
name Tiffanie Bustamante     Dx Adhesive capsulitis L shld           Y/G Exercises Current Session Time    THER ACT  CPT 44889 TOTAL TIME FOR SESSION Not performed    Patient Education Patient educated in shoulder biomechanics and anatomy involved. Educated in  findings on eval and POC moving forward. She verbalized understanding.   y HEP Issued and reviewed updated HEP     THER EX CPT 74012 TOTAL TIME FOR SESSION 38-52 Minutes           STRETCHING         home Posteror capsule stretch 10 sec x 4 cross arm stretch   y Standing cane shld extension 1 x 10 5 sec hold   y Supine Cane flexion and Circles 1 x 10 each direction     y Supine Cane Ext Rotation 1 x 10 5 sec hold   y Towel Internal Rot Stretch 1x 10   10sec hold   y Pulleys Shoulder Flex 1 x 10   y Pulleys Shoulder Abd 1 x 10   y Doorway Pec Stretch 10 sec x 5   y Wall Walk Shoulder Flex 1 x 10   y Wall Walk Shoulder Abd 1 x 10                         STRENGTHENING     Shoulder Blade Squeeze 5 sec hold 1 x 10 (done prone)   y Serratus punches 2 x 10 3#   y Ball circles at 90* 1 kg ball 1 x 10 each  cw/ccw    Shoulder Flex     Shoulder Abd    y Shoulder Internal Rotation PTB 2 x 10   y Shoulder External Rotation PTB 2x 10   y Shoulder Retractions BTB 2 x 10   y Shoulder Extension BTB 2 x 10   y No Money PTB 2 x 10   y Shoulder horizontal abduction PTB 2 x 10    TYI Is and Ts only 1 x 10 each   y Overhead press in limited range 3# 1x10   y Supine flies Arms straight 3# 2x10   y Chest press 3# 2x10   y Biceps curls 5# 2x10   y Triceps extensions 3# 2x10   y UBE 6 min level1    NEURO RE-ED CPT 67893   TOTAL TIME FOR SESSION Not performed          MANUAL CPT 42864 TOTAL TIME FOR SESSION 8-22 Minutes   y STM L upper trap, biceps, supraspinatus    Y    y Joint Mobs/PROM L shld Glenohumeral jnt mobs/PROM L shoulder  Scapular mobs     MFR Thoracic paraspinals     MODALITIES  Heat/ice CPT 49113   TOTAL TIME FOR SESSION Not performed    US  CPT 85359      E-stim CPT 23673

## 2024-10-23 NOTE — PROGRESS NOTES
Physical Therapy Visit    PT DAILY NOTE FOR OUTPATIENT THERAPY    Patient: Renee Bustamante MRN: [id number]  : 1965 58 y.o.  Referring Physician: Harshil Yeung PA*  Date of Visit: 10/23/2024    Certification Dates: 24 through 24    Diagnosis:   1. Adhesive capsulitis of left shoulder        Chief Complaints:  shoulder pain, decreased ROM and strength    Precautions:   Existing Precautions/Restrictions: no known precautions/restrictions      TODAY'S VISIT    Time In Session:  Start Time: 1000  Stop Time: 1100  Time Calculation (min): 60 min   History/Vitals/Pain/Encounter Info - 10/23/24 1006          Injury History/Precautions/Daily Required Info    Document Type daily treatment     Primary Therapist Xochitl Rucker, PT     Chief Complaint/Reason for Visit  shoulder pain, decreased ROM and strength     Onset of Illness/Injury or Date of Surgery --   May 2024    Referring Physician ANGELO Nunn,C     Existing Precautions/Restrictions no known precautions/restrictions     History of present illness/functional impairment This 58 year old patient reports L shoulder pain which began gradually  after her knee surgery in May 2024. She saw Ortho MD last week who did xray which was normal. He gave her a cortisone injection in the L shoulder and ordered PT.     Patient/Family/Caregiver Comments/Observations Pt reporting no pain. She reports hooking her bra the other day     Patient reported fall since last visit No        Pain Assessment    Currently in pain No/Denies                    Daily Treatment Assessment and Plan - 10/23/24 1006          Daily Treatment Assessment and Plan    Progress toward goals Progressing     Daily Outcome Summary Pt feeling good, much less pain overall and improving function. Added several new exercises. Ones she used to do at home prior to knee surgery. She is OK to resume these, hest press, chest flies, biceps, triceps kick backs and overhead press in  limited range     Plan and Recommendations continue with manual and strengthening. Progress as able. Monitor symptoms                         OBJECTIVE DATA TAKEN TODAY:    None taken    Today's Treatment:    name Tiffanie Bustamante     Dx Adhesive capsulitis L shld           Y/G Exercises Current Session Time    THER ACT  CPT 45450 TOTAL TIME FOR SESSION Not performed    Patient Education Patient educated in shoulder biomechanics and anatomy involved. Educated in  findings on eval and POC moving forward. She verbalized understanding.   y HEP Issued and reviewed updated HEP     THER EX CPT 37388 TOTAL TIME FOR SESSION 38-52 Minutes           STRETCHING         home Posteror capsule stretch 10 sec x 4 cross arm stretch   y Standing cane shld extension 1 x 10 5 sec hold   y Supine Cane flexion and Circles 1 x 10 each direction     y Supine Cane Ext Rotation 1 x 10 5 sec hold   y Towel Internal Rot Stretch 1x 10   10sec hold   y Pulleys Shoulder Flex 1 x 10   y Pulleys Shoulder Abd 1 x 10   y Doorway Pec Stretch 10 sec x 5   y Wall Walk Shoulder Flex 1 x 10   y Wall Walk Shoulder Abd 1 x 10                         STRENGTHENING     Shoulder Blade Squeeze 5 sec hold 1 x 10 (done prone)   y Serratus punches 2 x 10 3#   y Ball circles at 90* 1 kg ball 1 x 10 each  cw/ccw    Shoulder Flex     Shoulder Abd    y Shoulder Internal Rotation PTB 2 x 10   y Shoulder External Rotation PTB 2x 10   y Shoulder Retractions BTB 2 x 10   y Shoulder Extension BTB 2 x 10   y No Money PTB 2 x 10   y Shoulder horizontal abduction PTB 2 x 10    TYI Is and Ts only 1 x 10 each   y Overhead press in limited range 3# 1x10   y Supine flies Arms straight 3# 2x10   y Chest press 3# 2x10   y Biceps curls 5# 2x10   y Triceps extensions 3# 2x10   y UBE 6 min level1    NEURO RE-ED CPT 57422   TOTAL TIME FOR SESSION Not performed          MANUAL CPT 36051 TOTAL TIME FOR SESSION 8-22 Minutes   y STM L upper trap, biceps, supraspinatus    Y    y Joint  Mobs/PROM L shld Glenohumeral jnt mobs/PROM L shoulder  Scapular mobs     MFR Thoracic paraspinals     MODALITIES  Heat/ice CPT 32024   TOTAL TIME FOR SESSION Not performed      CPT 52088      E-stim CPT 62544

## 2024-10-30 ENCOUNTER — HOSPITAL ENCOUNTER (OUTPATIENT)
Dept: OCCUPATIONAL THERAPY | Facility: REHABILITATION | Age: 59
Setting detail: THERAPIES SERIES
Discharge: HOME | End: 2024-10-30
Attending: PHYSICIAN ASSISTANT
Payer: COMMERCIAL

## 2024-10-30 DIAGNOSIS — M75.02 ADHESIVE CAPSULITIS OF LEFT SHOULDER: Primary | ICD-10-CM

## 2024-10-30 PROCEDURE — 97530 THERAPEUTIC ACTIVITIES: CPT | Mod: GP

## 2024-10-30 PROCEDURE — 97140 MANUAL THERAPY 1/> REGIONS: CPT | Mod: GP

## 2024-10-30 PROCEDURE — 97110 THERAPEUTIC EXERCISES: CPT | Mod: GP

## 2024-10-30 NOTE — OP PT TREATMENT LOG
name Tiffanie Bustamante     Dx Adhesive capsulitis L shld           Y/G Exercises Current Session Time    THER ACT  CPT 41868 TOTAL TIME FOR SESSION 8-22 Minutes    Patient Education Patient educated in shoulder biomechanics and anatomy involved. Educated in  findings on eval and POC moving forward. She verbalized understanding.   y Assessment Jameel shld scale, ROM, MMT, pain questionnaire   y HEP Reviewed HEP     THER EX CPT 47745 TOTAL TIME FOR SESSION 23-37 Minutes           STRETCHING         home Posteror capsule stretch 10 sec x 4 cross arm stretch    Standing cane shld extension 1 x 10 5 sec hold   y Supine Cane flexion and Circles 1 x 10 each direction     y Supine Cane Ext Rotation 1 x 10 5 sec hold    Towel Internal Rot Stretch 1x 10   10sec hold    Pulleys Shoulder Flex 1 x 10    Pulleys Shoulder Abd 1 x 10   y Doorway Pec Stretch 10 sec x 5    Wall Walk Shoulder Flex 1 x 10    Wall Walk Shoulder Abd 1 x 10   y Sleeper stretch for IR 10 sec x 10                    STRENGTHENING     Shoulder Blade Squeeze 5 sec hold 1 x 10 (done prone)   y Serratus punches 3 x 10 3#   y Ball circles  Ball on wall 2 x 10 each   y Shoulder Flex  Shoulder scaption 2# 1 x 10  2# 1 x 10   y Shoulder Abd 2# 1 x 10    Shoulder Internal Rotation PTB 2 x 10    Shoulder External Rotation PTB 2x 10    Shoulder Retractions BTB 2 x 10    Shoulder Extension BTB 2 x 10    No Money PTB 2 x 10    Shoulder horizontal abduction PTB 2 x 10   y TYI Over Tball 1 x 10 each    Overhead press in limited range 3# 1x10    Supine flies Arms straight 3# 2x10    Chest press 3# 2x10    Biceps curls 5# 2x10    Triceps extensions 3# 2x10    UBE 6 min level1    NEURO RE-ED CPT 40066   TOTAL TIME FOR SESSION Not performed          MANUAL CPT 35639 TOTAL TIME FOR SESSION 8-22 Minutes   y STM L upper trap, biceps, supraspinatus    Y    y Joint Mobs/PROM L shld Glenohumeral jnt mobs/PROM L shoulder  Scapular mobs     MFR Thoracic paraspinals      MODALITIES  Heat/ice CPT 98779   TOTAL TIME FOR SESSION Not performed    US  CPT 10240      E-stim CPT 76783

## 2024-10-31 NOTE — PROGRESS NOTES
Physical Therapy Discharge      PT DISCHARGE NOTE FOR OUTPATIENT THERAPY    Patient: Renee Bustamante MRN: [id number]  : 1965 58 y.o.  Referring Physician: Harshil Yeung PA*  Date of Visit: 10/30/2024      Certification Dates: 24 through 24    Total Visit Count: 6    Diagnosis:   1. Adhesive capsulitis of left shoulder        Chief Complaints:  Chief Complaint   Patient presents with    Dec ROM    Pain     L shoulder       Precautions:  Existing Precautions/Restrictions: no known precautions/restrictions      TODAY'S VISIT:    Time In Session:  Start Time: 0900  Stop Time: 1000  Time Calculation (min): 60 min   General Information - 10/30/24 0906          Session Details    Document Type daily treatment     Mode of Treatment individual therapy        General Information    Onset of Illness/Injury or Date of Surgery --   May 2024    Referring Physician ANGELO Nunn,C     History of present illness/functional impairment This 58 year old patient reports L shoulder pain which began gradually  after her knee surgery in May 2024. She saw Ortho MD last week who did xray which was normal. He gave her a cortisone injection in the L shoulder and ordered PT.     Patient/Family/Caregiver Comments/Observations Patient reports no pain and shoulder is doing much better.     Existing Precautions/Restrictions no known precautions/restrictions                    Daily Falls Screen - 10/30/24 0906          Daily Falls Assessment    Patient reported fall since last visit No                      PT - 10/30/24 0906          Physical Therapy    Physical Therapy [facility] PT        PT Plan    Frequency of treatment 1 time/week     PT Duration 8 weeks     PT Custom Frequency and Duration 2-3x/week     PT Cert From 24     PT Cert To 24     Date PT POC was sent to provider 24     Signed PT Plan of Care received?  Yes                    Assessment and Plan - 10/30/24  1940          Assessment    Clinical Assessment Tiffanie Bustamante was seen today for her discharge visit due to a limited number of visits (6) after completing program for TKA due to her insurance. She has made excellent progress with her L shoulder pain decreased to 0/10 on average and 1/10 at worst. Her active and PROM has improved significantly and her MMT has increased 1/2 grade. Her Jameel Shoulder score improved from 54% to 87%, indicating much improved overall function and she is now able to cook and do gym workouts with no increased pain except with occasional OH movements. She is Independent with her HEP and goes to the gym 3 days a week.     Plan and Recommendations Patient discharged.                     OBJECTIVE MEASUREMENTS/DATA:    Palpation    Palpation - 10/30/24 0900          Palpation    UE Palpation  decreased TTP at biceps, mild tenderness supraspinatus, decreased tightness L upper trap                   Balance/Posture    Balance and Posture - 10/30/24 1940          Postural Deviations    Comment, Postural Deviations Patient with much improved postural awareness with  decreased rounded shld and fwd head, L shld no longer elevated, slight  thoracic kyphosis                     ROM and MMT          5/29/2024 6/24/2024 7/17/2024 8/12/2024 9/25/2024    09:00 10/30/2024   PT UE ROM Measurements   AROM: Right UE Gross Movement     WNL    PROM: Left Shoulder Flexion     145 degrees 175 degrees   AROM: Left Shoulder Flexion     140 degrees 160 degrees   PROM: Left Shoulder Extension     45 degrees 50 degrees   AROM: Left Shoulder Extension     45 degrees 50 degrees   PROM: Left Shoulder ABD     122 degrees 170 degrees   AROM: Left Shoulder ABD     100 degrees 155 degrees   PROM: Left Shoulder IR     38 degrees 60 degrees   AROM: Left Shoulder IR     28 degrees 55 degrees   PROM: Left Shoulder ER     48 degrees 70 degrees   AROM: Left Shoulder ER     45 degrees 67 degrees   PT LE ROM Measurements   PROM:  Right Knee Flexion 125 128 129 130     AROM: Right Knee Flexion 118 122 126 126     AROM: Left Knee Flexion 140   140     PROM: Right Knee Extension 0 0 0 0     AROM: Right Knee Extension -4 0 0 0     AROM: Left Knee Extension 0   0     PROM: Right Ankle DF 12 degrees 13 degrees       AROM: Right Ankle DF 10 degrees 10 degrees 12 degrees 12 degrees     AROM: Left Ankle DF 10 degrees        PT UE MMT Measurements   Right Shoulder Flexion     (5/5) normal    Left Shoulder Flexion     (4/5) good (4+/5) good plus   Right Shoulder Extension     (5/5) normal    Left Shoulder Extension     (4+/5) good plus (4+/5) good plus   Left Shoulder ADD     (4/5) good (4+/5) good plus   Right Shoulder ABD     (5/5) normal    Left Shoulder ABD     (4/5) good (4+/5) good plus   Right Shoulder IR     (5/5) normal    Left Shoulder IR     (4/5) good (4+/5) good plus   Right Shoulder ER     (5/5) normal    Left Shoulder ER     (4-/5) good minus (4/5) good   PT LE MMT   Right Hip Flexion (4+/5) good plus (4+/5) good plus (5/5) normal (5/5) normal     Left Hip Flexion (5/5) normal   (5/5) normal     Right Hip Extension (4+/5) good plus (4+/5) good plus (4+/5) good plus (5/5) normal     Left Hip Extension (5/5) normal   (5/5) normal     Right Hip ABD (4/5) good (4+/5) good plus (4+/5) good plus (5/5) normal     Left Hip ABD (5/5) normal   (5/5) normal     Right Hip ADD (4/5) good (4+/5) good plus (5/5) normal (5/5) normal     Left Hip ADD    (5/5) normal     Right Hip IR (4/5) good (4+/5) good plus (5/5) normal (5/5) normal     Right Hip ER (4/5) good (4+/5) good plus (4+/5) good plus (4+/5) good plus     Left Hip ER (5/5) normal   (5/5) normal     Right Knee Flexion (4/5) good (4/5) good (4+/5) good plus (4+/5) good plus     Left Knee Flexion (5/5) normal   (5/5) normal     Right Knee Extension (4/5) good (4+/5) good plus (5/5) normal (5/5) normal     Left Knee Extension (5/5) normal   (5/5) normal     Right Ankle DF (4+/5) good plus (5/5)  normal (5/5) normal (5/5) normal     Left Ankle DF (5/5) normal   (5/5) normal     Right Ankle PF (4/5) good (4/5) good (4+/5) good plus (5/5) normal     Left Ankle PF (5/5) normal   (5/5) normal       Outcome Measures          5/29/2024    09:00 6/24/2024    10:15 7/17/2024    09:10 8/12/2024    09:00 9/25/2024    22:01 10/30/2024    09:00   PT OBJECTIVE Outcome Measures   6 Minute Walk Test   1708 feet 1970 feet     10 Meter Walk Test 7.08 meters/sec  6.6 meters/sec      Gait Speed (m/sec) 1.41 m/sec  1.52 m/sec      PT SUBJECTIVE Outcome Measures   Jameel Shoulder Score (PSS)     0.54 0.87   Other WOMAC= 29/96= 30%; Standing tolerance 10 min; walking tolerance= 15 min WOMAC= 31/96= 32%; Standing tolerance 15 min; walking tolerance 15 min WOMAC= 12/96= 12.5%; Standing tolerance 30 min; walking tolerance 20 min WOMAC=13/96= 13.5%; standing tolerance= 30 min; walking tolerance= 30 min         Today's Treatment:    Education provided:  Yes: See treatment log for details of education provided    name Tiffanie Bustamante     Dx Adhesive capsulitis L shld           Y/G Exercises Current Session Time    THER ACT  CPT 88121 TOTAL TIME FOR SESSION 8-22 Minutes    Patient Education Patient educated in shoulder biomechanics and anatomy involved. Educated in  findings on eval and POC moving forward. She verbalized understanding.   y Assessment [place] shld scale, ROM, MMT, pain questionnaire   y HEP Reviewed HEP     THER EX CPT 59941 TOTAL TIME FOR SESSION 23-37 Minutes           STRETCHING         home Posteror capsule stretch 10 sec x 4 cross arm stretch    Standing cane shld extension 1 x 10 5 sec hold   y Supine Cane flexion and Circles 1 x 10 each direction     y Supine Cane Ext Rotation 1 x 10 5 sec hold    Towel Internal Rot Stretch 1x 10   10sec hold    Pulleys Shoulder Flex 1 x 10    Pulleys Shoulder Abd 1 x 10   y Doorway Pec Stretch 10 sec x 5    Wall Walk Shoulder Flex 1 x 10    Wall Walk Shoulder Abd 1 x 10   y Sleeper  stretch for IR 10 sec x 10                    STRENGTHENING     Shoulder Blade Squeeze 5 sec hold 1 x 10 (done prone)   y Serratus punches 3 x 10 3#   y Ball circles  Ball on wall 2 x 10 each   y Shoulder Flex  Shoulder scaption 2# 1 x 10  2# 1 x 10   y Shoulder Abd 2# 1 x 10    Shoulder Internal Rotation PTB 2 x 10    Shoulder External Rotation PTB 2x 10    Shoulder Retractions BTB 2 x 10    Shoulder Extension BTB 2 x 10    No Money PTB 2 x 10    Shoulder horizontal abduction PTB 2 x 10   y TYI Over Tball 1 x 10 each    Overhead press in limited range 3# 1x10    Supine flies Arms straight 3# 2x10    Chest press 3# 2x10    Biceps curls 5# 2x10    Triceps extensions 3# 2x10    UBE 6 min level1    NEURO RE-ED CPT 67997   TOTAL TIME FOR SESSION Not performed          MANUAL CPT 23075 TOTAL TIME FOR SESSION 8-22 Minutes   y STM L upper trap, biceps, supraspinatus    Y    y Joint Mobs/PROM L shld Glenohumeral jnt mobs/PROM L shoulder  Scapular mobs     MFR Thoracic paraspinals     MODALITIES  Heat/ice CPT 14445   TOTAL TIME FOR SESSION Not performed    US  CPT 53451      E-stim CPT 63193                                                                                        Goals Addressed                      This Visit's Progress       Patient Stated      COMPLETED: <enter goal here> (pt-stated)         The patient's goals are to return to gym workouts and lifting things without pain--met except for overhead         Other      COMPLETED: RW shoulder STG and LTG         Short Term Goal Time Frame Comment Achieved   Pt will increase L shldPROM by 15 degrees 3-4 weeks met    Pt will increase L shld AROM by 15 degrees for improved functional motion 3-4 weeks  met     Pt will increase LUE MMT by 1/2 grade or more for improved functional strength 3-4 weeks  met     Pt will score 64 or better on the Jameel Shoulder score for improved tolerance in daily functional activities 3-4 weeks  met  10/30=87   Pt will be supervision with  HEP 3-4 weeks met    Pt will decrease pain of L shoulder to 2-3/10 at worst 3-4 weeks met       Long Term Goal Time Frame Comment Achieved   Pt will demonstrate L shoulder AROM to WNL all planes to allow improved motion with daily functional activities 6-8 weeks  met     Pt will demonstrate 5/5 MMT of LUE to improve functional strength with daily activities 6-8 weeks  ongoing     Pt will score 75/100 or better on Jameel Shoulder score for improved functional tolerance 6-8 weeks  met  10/30= 87   Pt will perform gym workouts and cooking without limitation 6-8 weeks met    Pt will be independent with HEP 6-8 weeks met    Pt will demonstrate 0-2/10 L shoulder pain at worst 6-8 weeks met

## 2024-11-13 RX ORDER — ESOMEPRAZOLE MAGNESIUM 40 MG/1
40 CAPSULE, DELAYED RELEASE ORAL
Qty: 30 CAPSULE | Refills: 5 | Status: SHIPPED | OUTPATIENT
Start: 2024-11-13

## 2024-11-13 RX ORDER — VENLAFAXINE HYDROCHLORIDE 75 MG/1
CAPSULE, EXTENDED RELEASE ORAL DAILY
Qty: 30 CAPSULE | Refills: 5 | Status: SHIPPED | OUTPATIENT
Start: 2024-11-13

## 2024-12-30 ENCOUNTER — TELEPHONE (OUTPATIENT)
Dept: PRIMARY CARE | Facility: CLINIC | Age: 59
End: 2024-12-30
Payer: COMMERCIAL

## 2024-12-30 DIAGNOSIS — R55 SYNCOPE, UNSPECIFIED SYNCOPE TYPE: Primary | ICD-10-CM

## 2025-01-01 LAB
ALBUMIN SERPL-MCNC: 4.1 G/DL (ref 3.8–4.9)
ALP SERPL-CCNC: 69 IU/L (ref 44–121)
ALT SERPL-CCNC: 14 IU/L (ref 0–32)
AST SERPL-CCNC: 17 IU/L (ref 0–40)
BASOPHILS # BLD AUTO: 0 X10E3/UL (ref 0–0.2)
BASOPHILS NFR BLD AUTO: 1 %
BILIRUB SERPL-MCNC: <0.2 MG/DL (ref 0–1.2)
BUN SERPL-MCNC: 13 MG/DL (ref 6–24)
BUN/CREAT SERPL: 22 (ref 9–23)
CALCIUM SERPL-MCNC: 9.2 MG/DL (ref 8.7–10.2)
CHLORIDE SERPL-SCNC: 101 MMOL/L (ref 96–106)
CHOLEST SERPL-MCNC: 224 MG/DL (ref 100–199)
CO2 SERPL-SCNC: 22 MMOL/L (ref 20–29)
CREAT SERPL-MCNC: 0.6 MG/DL (ref 0.57–1)
EGFRCR SERPLBLD CKD-EPI 2021: 103 ML/MIN/1.73
EOSINOPHIL # BLD AUTO: 0.1 X10E3/UL (ref 0–0.4)
EOSINOPHIL NFR BLD AUTO: 3 %
ERYTHROCYTE [DISTWIDTH] IN BLOOD BY AUTOMATED COUNT: 12 % (ref 11.7–15.4)
GLOBULIN SER CALC-MCNC: 2.3 G/DL (ref 1.5–4.5)
GLUCOSE SERPL-MCNC: 90 MG/DL (ref 70–99)
HCT VFR BLD AUTO: 39.4 % (ref 34–46.6)
HDLC SERPL-MCNC: 63 MG/DL
HGB BLD-MCNC: 12.7 G/DL (ref 11.1–15.9)
IMM GRANULOCYTES # BLD AUTO: 0 X10E3/UL (ref 0–0.1)
IMM GRANULOCYTES NFR BLD AUTO: 0 %
LDLC SERPL CALC-MCNC: 124 MG/DL (ref 0–99)
LYMPHOCYTES # BLD AUTO: 1.7 X10E3/UL (ref 0.7–3.1)
LYMPHOCYTES NFR BLD AUTO: 32 %
MCH RBC QN AUTO: 30 PG (ref 26.6–33)
MCHC RBC AUTO-ENTMCNC: 32.2 G/DL (ref 31.5–35.7)
MCV RBC AUTO: 93 FL (ref 79–97)
MONOCYTES # BLD AUTO: 0.4 X10E3/UL (ref 0.1–0.9)
MONOCYTES NFR BLD AUTO: 8 %
NEUTROPHILS # BLD AUTO: 3 X10E3/UL (ref 1.4–7)
NEUTROPHILS NFR BLD AUTO: 56 %
PLATELET # BLD AUTO: 225 X10E3/UL (ref 150–450)
POTASSIUM SERPL-SCNC: 4.6 MMOL/L (ref 3.5–5.2)
PROT SERPL-MCNC: 6.4 G/DL (ref 6–8.5)
RBC # BLD AUTO: 4.23 X10E6/UL (ref 3.77–5.28)
SODIUM SERPL-SCNC: 138 MMOL/L (ref 134–144)
T4 FREE SERPL-MCNC: 0.9 NG/DL (ref 0.82–1.77)
TRIGL SERPL-MCNC: 215 MG/DL (ref 0–149)
TSH SERPL DL<=0.005 MIU/L-ACNC: 2.86 UIU/ML (ref 0.45–4.5)
VLDLC SERPL CALC-MCNC: 37 MG/DL (ref 5–40)
WBC # BLD AUTO: 5.4 X10E3/UL (ref 3.4–10.8)

## 2025-01-06 ENCOUNTER — OFFICE VISIT (OUTPATIENT)
Dept: PRIMARY CARE | Facility: CLINIC | Age: 60
End: 2025-01-06
Payer: COMMERCIAL

## 2025-01-06 VITALS
RESPIRATION RATE: 16 BRPM | HEART RATE: 79 BPM | OXYGEN SATURATION: 98 % | SYSTOLIC BLOOD PRESSURE: 128 MMHG | DIASTOLIC BLOOD PRESSURE: 74 MMHG | HEIGHT: 66 IN | WEIGHT: 167 LBS | TEMPERATURE: 98.1 F | BODY MASS INDEX: 26.84 KG/M2

## 2025-01-06 DIAGNOSIS — R42 VERTIGO: ICD-10-CM

## 2025-01-06 DIAGNOSIS — G47.33 OSA (OBSTRUCTIVE SLEEP APNEA): ICD-10-CM

## 2025-01-06 DIAGNOSIS — N93.8 DUB (DYSFUNCTIONAL UTERINE BLEEDING): Primary | ICD-10-CM

## 2025-01-06 DIAGNOSIS — R53.82 CHRONIC FATIGUE: ICD-10-CM

## 2025-01-06 PROCEDURE — 99214 OFFICE O/P EST MOD 30 MIN: CPT | Performed by: FAMILY MEDICINE

## 2025-01-06 PROCEDURE — 3008F BODY MASS INDEX DOCD: CPT | Performed by: FAMILY MEDICINE

## 2025-01-06 RX ORDER — LISDEXAMFETAMINE DIMESYLATE 20 MG/1
20 CAPSULE ORAL EVERY MORNING
Qty: 30 CAPSULE | Refills: 0 | Status: SHIPPED | OUTPATIENT
Start: 2025-01-06 | End: 2025-02-04

## 2025-01-06 ASSESSMENT — ENCOUNTER SYMPTOMS
BACK PAIN: 0
EYE PAIN: 0
CHEST TIGHTNESS: 0
HEADACHES: 0
FLANK PAIN: 0
ACTIVITY CHANGE: 1
APPETITE CHANGE: 0
SHORTNESS OF BREATH: 0
DECREASED CONCENTRATION: 0
EYE DISCHARGE: 0
ARTHRALGIAS: 0
SINUS PAIN: 0
SLEEP DISTURBANCE: 0
NERVOUS/ANXIOUS: 0
FATIGUE: 1
DIFFICULTY URINATING: 0
ABDOMINAL PAIN: 0
DIZZINESS: 1
LIGHT-HEADEDNESS: 1
ADENOPATHY: 0
TROUBLE SWALLOWING: 0
PALPITATIONS: 0
WEAKNESS: 1

## 2025-01-06 ASSESSMENT — PATIENT HEALTH QUESTIONNAIRE - PHQ9: SUM OF ALL RESPONSES TO PHQ9 QUESTIONS 1 & 2: 0

## 2025-01-06 NOTE — PROGRESS NOTES
Daily Progress Note      Subjective      Patient ID: Renee Bustamante is a 59 y.o. female.    \[facility]\""  For review/discussion re fatigue, progressive, ongoing  Recurrent, chronic  Also, for dub discussion, review  Uses cpap    The following have been reviewed and updated as appropriate in this visit:   Problems       Review of Systems   Constitutional:  Positive for activity change and fatigue. Negative for appetite change.   HENT:  Negative for ear pain, sinus pain and trouble swallowing.    Eyes:  Negative for pain and discharge.   Respiratory:  Negative for chest tightness and shortness of breath.    Cardiovascular:  Negative for chest pain and palpitations.   Gastrointestinal:  Negative for abdominal pain.   Genitourinary:  Negative for difficulty urinating, flank pain, menstrual problem and vaginal bleeding.   Musculoskeletal:  Negative for arthralgias, back pain and gait problem.   Skin:  Negative for rash.   Neurological:  Positive for dizziness, weakness and light-headedness. Negative for syncope and headaches.   Hematological:  Negative for adenopathy.   Psychiatric/Behavioral:  Negative for decreased concentration and sleep disturbance. The patient is not nervous/anxious.        Current Outpatient Medications   Medication Sig Dispense Refill    esomeprazole (NexIUM) 40 mg capsule TAKE 1 CAPSULE BY MOUTH EVERY DAY BEFORE BREAKFAST 30 capsule 5    estradioL (ESTRACE) 1 mg tablet Take 1 mg by mouth daily.      lisdexamfetamine (VYVANSE) 20 mg capsule Take 1 capsule (20 mg total) by mouth every morning. 30 capsule 0    venlafaxine XR (EFFEXOR-XR) 75 mg 24 hr capsule TAKE 1 CAPSULE BY MOUTH EVERY DAY 30 capsule 5     No current facility-administered medications for this visit.     History reviewed. No pertinent past medical history.  Family History   Problem Relation Name Age of Onset    Alzheimer's disease Biological Mother      Lung disease Biological Mother      Osteoporosis Biological Mother      Cancer  Biological Father          Bladder cancer    Hypertension Biological Father       No past surgical history on file.  Social History     Socioeconomic History    Marital status:      Spouse name: Not on file    Number of children: Not on file    Years of education: Not on file    Highest education level: Not on file   Occupational History    Not on file   Tobacco Use    Smoking status: Some Days     Types: Cigarettes     Passive exposure: Current (vape)    Smokeless tobacco: Never    Tobacco comments:     Pt Vapes   Vaping Use    Vaping status: Every Day    Start date: 8/12/2019    Substances: Nicotine, THC, CBD, Flavoring    Devices: Disposable, Pre-filled or refillable cartridge, Refillable tank, Pre-filled pod   Substance and Sexual Activity    Alcohol use: Yes     Comment: social    Drug use: Never    Sexual activity: Never   Other Topics Concern    Not on file   Social History Narrative    Not on file     Social Drivers of Health     Financial Resource Strain: Not on file   Food Insecurity: No Food Insecurity (9/25/2024)    Hunger Vital Sign     Worried About Running Out of Food in the Last Year: Never true     Ran Out of Food in the Last Year: Never true   Transportation Needs: Not on file   Physical Activity: Not on file   Stress: Not on file   Social Connections: Not on file   Intimate Partner Violence: Not on file   Housing Stability: Not on file     Allergies   Allergen Reactions    No Known Allergies        Objective   I have reviewed the patient's pertinent labs. Pertinent labs are within normal limits.    Physical Exam  Constitutional:       Appearance: Normal appearance. She is well-developed.   HENT:      Head: Normocephalic and atraumatic.      Right Ear: Tympanic membrane and ear canal normal.      Left Ear: Tympanic membrane and ear canal normal.      Nose: Nose normal.      Mouth/Throat:      Pharynx: Oropharynx is clear.   Eyes:      General: Lids are normal.      Conjunctiva/sclera:  Conjunctivae normal.      Pupils: Pupils are equal, round, and reactive to light.   Neck:      Trachea: Trachea normal.   Cardiovascular:      Rate and Rhythm: Normal rate and regular rhythm.      Heart sounds: Normal heart sounds.   Pulmonary:      Effort: Pulmonary effort is normal.      Breath sounds: Normal breath sounds. No wheezing.   Abdominal:      General: Bowel sounds are normal.      Palpations: Abdomen is soft. There is no mass.      Tenderness: There is no abdominal tenderness. There is no guarding or rebound.   Musculoskeletal:         General: Normal range of motion.      Cervical back: Full passive range of motion without pain and normal range of motion.   Lymphadenopathy:      Cervical: No cervical adenopathy.   Skin:     General: Skin is warm and dry.   Neurological:      General: No focal deficit present.      Mental Status: She is alert and oriented to person, place, and time.   Psychiatric:         Speech: Speech normal.         Behavior: Behavior normal.         Thought Content: Thought content normal.         Judgment: Judgment normal.         Assessment/Plan     Problem List Items Addressed This Visit       CORTEZ (obstructive sleep apnea)     Other Visit Diagnoses       DUB (dysfunctional uterine bleeding)    -  Primary    Relevant Orders    FSH/LH    Estrogens, Total Serum    Progesterone    TSH w reflex FT4    Iron and TIBC    Vitamin D 25 hydroxy    Vertigo        Relevant Orders    FSH/LH    Estrogens, Total Serum    Progesterone    TSH w reflex FT4    Iron and TIBC    Vitamin D 25 hydroxy    Chronic fatigue              Orders Placed This Encounter   Procedures    FSH/LH     Standing Status:   Future     Number of Occurrences:   1     Standing Expiration Date:   1/6/2026     Order Specific Question:   Release to patient     Answer:   Immediate [1]    Estrogens, Total Serum     Standing Status:   Future     Number of Occurrences:   1     Standing Expiration Date:   1/6/2026     Order  Specific Question:   Release to patient     Answer:   Immediate [1]    Progesterone     Standing Status:   Future     Number of Occurrences:   1     Standing Expiration Date:   1/6/2026     Order Specific Question:   Release to patient     Answer:   Immediate [1]    TSH w reflex FT4     Standing Status:   Future     Number of Occurrences:   1     Standing Expiration Date:   1/6/2026     Order Specific Question:   Release to patient     Answer:   Immediate [1]    Iron and TIBC     Standing Status:   Future     Number of Occurrences:   1     Standing Expiration Date:   1/6/2026     Order Specific Question:   Release to patient     Answer:   Immediate [1]    Vitamin D 25 hydroxy     Standing Status:   Future     Number of Occurrences:   1     Standing Expiration Date:   1/6/2026     Order Specific Question:   Release to patient     Answer:   Immediate [1]     Reviewed rx, dx, tx, hx, labs  Discussed dub, labs, us, consults  Will re-check labs  Disc thyr. Will follow  Counseled, reviewed at length  Will trial vyvanse qam  Stable on rx, c/w same  Re-check 5 d    Mayur Santos,   1/6/2025

## 2025-01-18 LAB
FSH SERPL-ACNC: 16.9 MIU/ML
LH SERPL-ACNC: 17.3 MIU/ML
PROGEST SERPL-MCNC: 9.1 NG/ML
T4 FREE SERPL-MCNC: 1.27 NG/DL (ref 0.82–1.77)
TSH SERPL DL<=0.005 MIU/L-ACNC: 2.34 UIU/ML (ref 0.45–4.5)

## 2025-01-22 LAB — ESTROGEN SERPL-MCNC: 525 PG/ML (ref 40–244)

## 2025-02-03 ENCOUNTER — APPOINTMENT (OUTPATIENT)
Dept: URBAN - METROPOLITAN AREA CLINIC 203 | Age: 60
Setting detail: DERMATOLOGY
End: 2025-02-03

## 2025-02-03 DIAGNOSIS — L82.1 OTHER SEBORRHEIC KERATOSIS: ICD-10-CM

## 2025-02-03 DIAGNOSIS — Z71.89 OTHER SPECIFIED COUNSELING: ICD-10-CM

## 2025-02-03 DIAGNOSIS — D18.0 HEMANGIOMA: ICD-10-CM

## 2025-02-03 DIAGNOSIS — Z85.828 PERSONAL HISTORY OF OTHER MALIGNANT NEOPLASM OF SKIN: ICD-10-CM

## 2025-02-03 DIAGNOSIS — L81.4 OTHER MELANIN HYPERPIGMENTATION: ICD-10-CM

## 2025-02-03 DIAGNOSIS — L57.8 OTHER SKIN CHANGES DUE TO CHRONIC EXPOSURE TO NONIONIZING RADIATION: ICD-10-CM

## 2025-02-03 DIAGNOSIS — D22 MELANOCYTIC NEVI: ICD-10-CM

## 2025-02-03 DIAGNOSIS — L57.0 ACTINIC KERATOSIS: ICD-10-CM

## 2025-02-03 PROBLEM — D18.01 HEMANGIOMA OF SKIN AND SUBCUTANEOUS TISSUE: Status: ACTIVE | Noted: 2025-02-03

## 2025-02-03 PROBLEM — D22.5 MELANOCYTIC NEVI OF TRUNK: Status: ACTIVE | Noted: 2025-02-03

## 2025-02-03 PROCEDURE — 99213 OFFICE O/P EST LOW 20 MIN: CPT

## 2025-02-03 PROCEDURE — OTHER PRESCRIPTION: OTHER

## 2025-02-03 PROCEDURE — OTHER REASSURANCE: OTHER

## 2025-02-03 PROCEDURE — OTHER SUNSCREEN RECOMMENDATIONS: OTHER

## 2025-02-03 PROCEDURE — OTHER PRESCRIPTION MEDICATION MANAGEMENT: OTHER

## 2025-02-03 PROCEDURE — OTHER COUNSELING: OTHER

## 2025-02-03 ASSESSMENT — LOCATION DETAILED DESCRIPTION DERM
LOCATION DETAILED: LEFT MEDIAL FRONTAL SCALP
LOCATION DETAILED: MIDDLE STERNUM
LOCATION DETAILED: LEFT DORSAL FOOT
LOCATION DETAILED: LEFT SUPERIOR MEDIAL MALAR CHEEK
LOCATION DETAILED: LEFT INFERIOR CENTRAL MALAR CHEEK
LOCATION DETAILED: SUBXIPHOID
LOCATION DETAILED: NASAL DORSUM
LOCATION DETAILED: LEFT MEDIAL BREAST 10-11:00 REGION
LOCATION DETAILED: LEFT MEDIAL BREAST 11-12:00 REGION
LOCATION DETAILED: LEFT INFERIOR MEDIAL FOREHEAD
LOCATION DETAILED: RIGHT SUPERIOR MEDIAL UPPER BACK

## 2025-02-03 ASSESSMENT — LOCATION SIMPLE DESCRIPTION DERM
LOCATION SIMPLE: ABDOMEN
LOCATION SIMPLE: LEFT FOOT
LOCATION SIMPLE: LEFT BREAST
LOCATION SIMPLE: CHEST
LOCATION SIMPLE: RIGHT UPPER BACK
LOCATION SIMPLE: LEFT FOREHEAD
LOCATION SIMPLE: LEFT CHEEK
LOCATION SIMPLE: NOSE
LOCATION SIMPLE: LEFT SCALP

## 2025-02-03 ASSESSMENT — LOCATION ZONE DERM
LOCATION ZONE: FEET
LOCATION ZONE: SCALP
LOCATION ZONE: NOSE
LOCATION ZONE: TRUNK
LOCATION ZONE: FACE

## 2025-02-03 NOTE — PROCEDURE: PRESCRIPTION MEDICATION MANAGEMENT
Samples Given: Amlactin and ceraVe SA cream
Render In Strict Bullet Format?: No
Detail Level: Zone
Plan: Recommended vanicream mineral sunscreen for application after using klysiri QHS x 5 nights
Initiate Treatment: Klysiri QHS x 5 nights to forehead, nose and under left eye

## 2025-02-04 ENCOUNTER — TELEPHONE (OUTPATIENT)
Dept: PRIMARY CARE | Facility: CLINIC | Age: 60
End: 2025-02-04
Payer: COMMERCIAL

## 2025-02-04 PROBLEM — Z79.890 HORMONE REPLACEMENT THERAPY: Status: ACTIVE | Noted: 2025-02-04

## 2025-02-04 RX ORDER — PROGESTERONE 200 MG/1
200 CAPSULE ORAL DAILY
COMMUNITY
Start: 2025-01-15

## 2025-02-04 RX ORDER — LISDEXAMFETAMINE DIMESYLATE 30 MG/1
30 CAPSULE ORAL EVERY MORNING
Qty: 30 CAPSULE | Refills: 0 | Status: SHIPPED | OUTPATIENT
Start: 2025-02-04 | End: 2025-03-13 | Stop reason: SDUPTHER

## 2025-02-04 RX ORDER — AZITHROMYCIN 250 MG/1
TABLET, FILM COATED ORAL
Qty: 6 TABLET | Refills: 0 | Status: SHIPPED | OUTPATIENT
Start: 2025-02-04 | End: 2025-02-09

## 2025-02-06 RX ORDER — TIRBANIBULIN 10 MG/G
OINTMENT TOPICAL QHS
Qty: 5 | Refills: 1 | Status: ERX | COMMUNITY
Start: 2025-02-06

## 2025-02-06 RX ORDER — TIRBANIBULIN 10 MG/G
OINTMENT TOPICAL QHS
Qty: 5 | Refills: 1 | Status: CANCELLED

## 2025-03-13 ENCOUNTER — TELEPHONE (OUTPATIENT)
Dept: PRIMARY CARE | Facility: CLINIC | Age: 60
End: 2025-03-13
Payer: COMMERCIAL

## 2025-03-13 RX ORDER — LISDEXAMFETAMINE DIMESYLATE 30 MG/1
30 CAPSULE ORAL EVERY MORNING
Qty: 30 CAPSULE | Refills: 0 | Status: SHIPPED | OUTPATIENT
Start: 2025-03-13 | End: 2025-04-12

## 2025-05-04 RX ORDER — VENLAFAXINE HYDROCHLORIDE 75 MG/1
CAPSULE, EXTENDED RELEASE ORAL DAILY
Qty: 30 CAPSULE | Refills: 5 | Status: SHIPPED | OUTPATIENT
Start: 2025-05-04

## 2025-05-04 RX ORDER — ESOMEPRAZOLE MAGNESIUM 40 MG/1
40 CAPSULE, DELAYED RELEASE ORAL
Qty: 30 CAPSULE | Refills: 5 | Status: SHIPPED | OUTPATIENT
Start: 2025-05-04

## 2025-05-05 NOTE — TELEPHONE ENCOUNTER
I want the study first.  If cpap is determined to be needed, it is unlikely that patient will want to use it anyway. She would want a sleep consult AFTER the in-lab study.  So, I am not referring to sleep specialist at this time.   The medication list included in this document is our record of what you are currently taking, including any changes that were made at today's visit.  If you find any differences when compared to your medications at home, or have any questions that were not answered at your visit, please contact the office.

## 2025-05-13 ENCOUNTER — TELEPHONE (OUTPATIENT)
Dept: PRIMARY CARE | Facility: CLINIC | Age: 60
End: 2025-05-13
Payer: COMMERCIAL

## 2025-05-13 RX ORDER — VALACYCLOVIR HYDROCHLORIDE 1 G/1
1000 TABLET, FILM COATED ORAL 3 TIMES DAILY
Qty: 21 TABLET | Refills: 0 | Status: SHIPPED | OUTPATIENT
Start: 2025-05-13 | End: 2025-05-20

## 2025-05-27 ENCOUNTER — TELEPHONE (OUTPATIENT)
Dept: PRIMARY CARE | Facility: CLINIC | Age: 60
End: 2025-05-27
Payer: COMMERCIAL

## 2025-05-27 RX ORDER — METHYLPREDNISOLONE 4 MG/1
TABLET ORAL
Qty: 21 TABLET | Refills: 0 | Status: SHIPPED | OUTPATIENT
Start: 2025-05-27 | End: 2025-06-03

## 2025-05-27 RX ORDER — DOXYCYCLINE HYCLATE 100 MG
100 TABLET ORAL 2 TIMES DAILY
Qty: 14 TABLET | Refills: 0 | Status: SHIPPED | OUTPATIENT
Start: 2025-05-27 | End: 2025-06-03

## 2025-06-18 ENCOUNTER — TELEPHONE (OUTPATIENT)
Dept: PRIMARY CARE | Facility: CLINIC | Age: 60
End: 2025-06-18
Payer: COMMERCIAL

## 2025-06-18 DIAGNOSIS — Z82.49 FAMILY HISTORY OF ASCVD: Primary | ICD-10-CM

## 2025-06-23 ENCOUNTER — HOSPITAL ENCOUNTER (OUTPATIENT)
Dept: RADIOLOGY | Facility: CLINIC | Age: 60
Discharge: HOME | End: 2025-06-23
Attending: FAMILY MEDICINE

## 2025-06-23 DIAGNOSIS — Z82.49 FAMILY HISTORY OF ASCVD: ICD-10-CM

## 2025-06-23 PROCEDURE — 75571 CT HRT W/O DYE W/CA TEST: CPT

## 2025-08-04 ENCOUNTER — APPOINTMENT (OUTPATIENT)
Dept: URBAN - METROPOLITAN AREA CLINIC 203 | Age: 60
Setting detail: DERMATOLOGY
End: 2025-08-07

## 2025-08-04 DIAGNOSIS — L81.4 OTHER MELANIN HYPERPIGMENTATION: ICD-10-CM

## 2025-08-04 DIAGNOSIS — D22 MELANOCYTIC NEVI: ICD-10-CM

## 2025-08-04 DIAGNOSIS — L82.1 OTHER SEBORRHEIC KERATOSIS: ICD-10-CM

## 2025-08-04 DIAGNOSIS — L82.0 INFLAMED SEBORRHEIC KERATOSIS: ICD-10-CM

## 2025-08-04 DIAGNOSIS — L57.8 OTHER SKIN CHANGES DUE TO CHRONIC EXPOSURE TO NONIONIZING RADIATION: ICD-10-CM

## 2025-08-04 DIAGNOSIS — Z85.828 PERSONAL HISTORY OF OTHER MALIGNANT NEOPLASM OF SKIN: ICD-10-CM

## 2025-08-04 DIAGNOSIS — D18.0 HEMANGIOMA: ICD-10-CM

## 2025-08-04 DIAGNOSIS — L90.5 SCAR CONDITIONS AND FIBROSIS OF SKIN: ICD-10-CM

## 2025-08-04 PROBLEM — D18.01 HEMANGIOMA OF SKIN AND SUBCUTANEOUS TISSUE: Status: ACTIVE | Noted: 2025-08-04

## 2025-08-04 PROBLEM — D22.5 MELANOCYTIC NEVI OF TRUNK: Status: ACTIVE | Noted: 2025-08-04

## 2025-08-04 PROCEDURE — OTHER LIQUID NITROGEN: OTHER

## 2025-08-04 PROCEDURE — 17110 DESTRUCT B9 LESION 1-14: CPT

## 2025-08-04 PROCEDURE — OTHER COUNSELING: OTHER

## 2025-08-04 PROCEDURE — OTHER SUNSCREEN RECOMMENDATIONS: OTHER

## 2025-08-04 PROCEDURE — OTHER REASSURANCE: OTHER

## 2025-08-04 PROCEDURE — 99213 OFFICE O/P EST LOW 20 MIN: CPT | Mod: 25

## 2025-08-04 ASSESSMENT — LOCATION DETAILED DESCRIPTION DERM
LOCATION DETAILED: SUBXIPHOID
LOCATION DETAILED: LEFT INFERIOR CENTRAL MALAR CHEEK
LOCATION DETAILED: LEFT SUPERIOR MEDIAL UPPER BACK
LOCATION DETAILED: LEFT MID-UPPER BACK
LOCATION DETAILED: RIGHT KNEE
LOCATION DETAILED: NASAL DORSUM
LOCATION DETAILED: LEFT DISTAL PRETIBIAL REGION
LOCATION DETAILED: LEFT MEDIAL BREAST 11-12:00 REGION
LOCATION DETAILED: LEFT MEDIAL BREAST 10-11:00 REGION
LOCATION DETAILED: RIGHT SUPERIOR MEDIAL UPPER BACK

## 2025-08-04 ASSESSMENT — LOCATION SIMPLE DESCRIPTION DERM
LOCATION SIMPLE: NOSE
LOCATION SIMPLE: LEFT PRETIBIAL REGION
LOCATION SIMPLE: ABDOMEN
LOCATION SIMPLE: LEFT CHEEK
LOCATION SIMPLE: RIGHT UPPER BACK
LOCATION SIMPLE: RIGHT KNEE
LOCATION SIMPLE: LEFT BREAST
LOCATION SIMPLE: LEFT UPPER BACK

## 2025-08-04 ASSESSMENT — LOCATION ZONE DERM
LOCATION ZONE: TRUNK
LOCATION ZONE: FACE
LOCATION ZONE: LEG
LOCATION ZONE: NOSE